# Patient Record
Sex: MALE | Race: WHITE | NOT HISPANIC OR LATINO | Employment: FULL TIME | URBAN - METROPOLITAN AREA
[De-identification: names, ages, dates, MRNs, and addresses within clinical notes are randomized per-mention and may not be internally consistent; named-entity substitution may affect disease eponyms.]

---

## 2017-03-03 ENCOUNTER — TELEPHONE (OUTPATIENT)
Dept: SURGERY | Facility: CLINIC | Age: 32
End: 2017-03-03

## 2017-03-03 NOTE — TELEPHONE ENCOUNTER
----- Message from Rbeanna Sherif sent at 3/3/2017  3:03 PM CST -----  Contact: self - [phone]  maria teresa - wants appt for fistula repair - wants appt this Monday - states he is in pain - please call patient at

## 2017-03-03 NOTE — TELEPHONE ENCOUNTER
Spoke with patient. Scheduled appointment 3/7/17. Patient states will bring all reports and imaging disc with him to appointment.

## 2017-03-07 ENCOUNTER — OFFICE VISIT (OUTPATIENT)
Dept: SURGERY | Facility: CLINIC | Age: 32
End: 2017-03-07
Payer: COMMERCIAL

## 2017-03-07 VITALS
HEART RATE: 79 BPM | BODY MASS INDEX: 27.64 KG/M2 | DIASTOLIC BLOOD PRESSURE: 75 MMHG | HEIGHT: 73 IN | SYSTOLIC BLOOD PRESSURE: 126 MMHG | WEIGHT: 208.56 LBS

## 2017-03-07 DIAGNOSIS — K60.3 ANAL FISTULA: Primary | ICD-10-CM

## 2017-03-07 PROCEDURE — 99204 OFFICE O/P NEW MOD 45 MIN: CPT | Mod: S$GLB,,, | Performed by: COLON & RECTAL SURGERY

## 2017-03-07 PROCEDURE — 1160F RVW MEDS BY RX/DR IN RCRD: CPT | Mod: S$GLB,,, | Performed by: COLON & RECTAL SURGERY

## 2017-03-07 PROCEDURE — 99999 PR PBB SHADOW E&M-EST. PATIENT-LVL III: CPT | Mod: PBBFAC,,, | Performed by: COLON & RECTAL SURGERY

## 2017-03-07 NOTE — PROGRESS NOTES
CRS Office Visit    SUBJECTIVE:     Chief Complaint: Fistula     History of Present Illness:  Patient is a 31 y.o. 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  He states that he first noted the fistula around 20 years ago (when diagnosed with crohns) and it intermittently drains.  It is currently not draining.  Recent colonoscopy and MR entography showed no other disease however no biopsies were taken. He has history of 3 abscess I&Ds to that area.  He is currently not on any crohn's medications.  States that he has taken numerous medications in the past and his gastroenterologist wants to start him on Humira after surgery.  He has no abdominal surgery history.  Complains of chronic diarrhea, no constipation or blood in stool.  Denies abdominal pain.       Review of patient's allergies indicates:  No Known Allergies     No pertinent PMH, PSH, FH, SH not mentioned in HPI       Review of Systems:   A 10-point review of systems is negative except for the above mentioned in the HPI.      OBJECTIVE:     Vital Signs (Most Recent)  Pulse: 79 (03/07/17 1552)  BP: 126/75 (03/07/17 1552)    Physical Exam:  General: Patient Refused male in NAD sitting in chair in clinic  Neuro: aaox4 maex4 perrl  Respiratory: resps even unlabored  Cardiac: cap refill <2 sec  Abdomen: Abdomen soft, nontender. BS normal. No masses, organomegaly or scars.  Extremities: Warm dry and intact  Anorectal: Posterior fissure noted, anal skin tags noted, fistula opening noted at 7'oclock with no drainage     Imaging:  Outside MRI Enterography shows early fistulous tracts/fissure within the distal rectum/perianal region    ASSESSMENT/PLAN:     32 yo M with possible hx of crohn's disease presents today for evaluation of perianal fistula     - To OR on 3/15/17 for seton placement  - Will obtain more records from referring physician about Crohn's history  - Plan to perform a colonoscopy in near future  with biopsies    Javier Sheppard M.D.  General Surgery PGY1  [phone]

## 2017-03-15 ENCOUNTER — ANESTHESIA (OUTPATIENT)
Dept: SURGERY | Facility: HOSPITAL | Age: 32
End: 2017-03-15
Payer: COMMERCIAL

## 2017-03-15 ENCOUNTER — SURGERY (OUTPATIENT)
Age: 32
End: 2017-03-15

## 2017-03-15 ENCOUNTER — ANESTHESIA EVENT (OUTPATIENT)
Dept: SURGERY | Facility: HOSPITAL | Age: 32
End: 2017-03-15
Payer: COMMERCIAL

## 2017-03-15 ENCOUNTER — HOSPITAL ENCOUNTER (OUTPATIENT)
Facility: HOSPITAL | Age: 32
Discharge: HOME | End: 2017-03-15
Attending: COLON & RECTAL SURGERY | Admitting: COLON & RECTAL SURGERY
Payer: COMMERCIAL

## 2017-03-15 VITALS
OXYGEN SATURATION: 100 % | TEMPERATURE: 98 F | HEIGHT: 73 IN | SYSTOLIC BLOOD PRESSURE: 120 MMHG | DIASTOLIC BLOOD PRESSURE: 65 MMHG | HEART RATE: 62 BPM | BODY MASS INDEX: 27.64 KG/M2 | WEIGHT: 208.56 LBS | RESPIRATION RATE: 15 BRPM

## 2017-03-15 DIAGNOSIS — K60.3 ANAL FISTULA: Primary | ICD-10-CM

## 2017-03-15 PROBLEM — K60.30 ANAL FISTULA: Status: ACTIVE | Noted: 2017-03-15

## 2017-03-15 PROCEDURE — 63600175 PHARM REV CODE 636 W HCPCS: Performed by: STUDENT IN AN ORGANIZED HEALTH CARE EDUCATION/TRAINING PROGRAM

## 2017-03-15 PROCEDURE — 71000015 HC POSTOP RECOV 1ST HR: Performed by: COLON & RECTAL SURGERY

## 2017-03-15 PROCEDURE — 46020 PLACEMENT OF SETON: CPT | Mod: ,,, | Performed by: COLON & RECTAL SURGERY

## 2017-03-15 PROCEDURE — 25000003 PHARM REV CODE 250: Performed by: NURSE ANESTHETIST, CERTIFIED REGISTERED

## 2017-03-15 PROCEDURE — 36000705 HC OR TIME LEV I EA ADD 15 MIN: Performed by: COLON & RECTAL SURGERY

## 2017-03-15 PROCEDURE — 36000704 HC OR TIME LEV I 1ST 15 MIN: Performed by: COLON & RECTAL SURGERY

## 2017-03-15 PROCEDURE — 37000009 HC ANESTHESIA EA ADD 15 MINS: Performed by: COLON & RECTAL SURGERY

## 2017-03-15 PROCEDURE — 63600175 PHARM REV CODE 636 W HCPCS: Performed by: NURSE ANESTHETIST, CERTIFIED REGISTERED

## 2017-03-15 PROCEDURE — D9220A PRA ANESTHESIA: Mod: ANES,,, | Performed by: ANESTHESIOLOGY

## 2017-03-15 PROCEDURE — 37000008 HC ANESTHESIA 1ST 15 MINUTES: Performed by: COLON & RECTAL SURGERY

## 2017-03-15 PROCEDURE — D9220A PRA ANESTHESIA: Mod: CRNA,,, | Performed by: NURSE ANESTHETIST, CERTIFIED REGISTERED

## 2017-03-15 PROCEDURE — 25000003 PHARM REV CODE 250: Performed by: STUDENT IN AN ORGANIZED HEALTH CARE EDUCATION/TRAINING PROGRAM

## 2017-03-15 PROCEDURE — 25000003 PHARM REV CODE 250: Performed by: COLON & RECTAL SURGERY

## 2017-03-15 PROCEDURE — 71000033 HC RECOVERY, INTIAL HOUR: Performed by: COLON & RECTAL SURGERY

## 2017-03-15 PROCEDURE — 25000003 PHARM REV CODE 250: Performed by: SURGERY

## 2017-03-15 RX ORDER — ACETAMINOPHEN 10 MG/ML
1000 INJECTION, SOLUTION INTRAVENOUS
Status: COMPLETED | OUTPATIENT
Start: 2017-03-15 | End: 2017-03-15

## 2017-03-15 RX ORDER — LIDOCAINE HYDROCHLORIDE 10 MG/ML
1 INJECTION, SOLUTION EPIDURAL; INFILTRATION; INTRACAUDAL; PERINEURAL ONCE
Status: DISCONTINUED | OUTPATIENT
Start: 2017-03-15 | End: 2017-03-15 | Stop reason: HOSPADM

## 2017-03-15 RX ORDER — OXYCODONE AND ACETAMINOPHEN 5; 325 MG/1; MG/1
1 TABLET ORAL EVERY 4 HOURS PRN
Qty: 41 TABLET | Refills: 0 | Status: SHIPPED | OUTPATIENT
Start: 2017-03-15 | End: 2017-03-30

## 2017-03-15 RX ORDER — LIDOCAINE HCL/PF 100 MG/5ML
SYRINGE (ML) INTRAVENOUS
Status: DISCONTINUED | OUTPATIENT
Start: 2017-03-15 | End: 2017-03-15

## 2017-03-15 RX ORDER — PROPOFOL 10 MG/ML
VIAL (ML) INTRAVENOUS
Status: DISCONTINUED | OUTPATIENT
Start: 2017-03-15 | End: 2017-03-15

## 2017-03-15 RX ORDER — MIDAZOLAM HYDROCHLORIDE 1 MG/ML
INJECTION, SOLUTION INTRAMUSCULAR; INTRAVENOUS
Status: DISCONTINUED | OUTPATIENT
Start: 2017-03-15 | End: 2017-03-15

## 2017-03-15 RX ORDER — SODIUM CHLORIDE 9 MG/ML
INJECTION, SOLUTION INTRAVENOUS CONTINUOUS PRN
Status: DISCONTINUED | OUTPATIENT
Start: 2017-03-15 | End: 2017-03-15

## 2017-03-15 RX ORDER — METRONIDAZOLE 500 MG/100ML
500 INJECTION, SOLUTION INTRAVENOUS
Status: DISCONTINUED | OUTPATIENT
Start: 2017-03-15 | End: 2017-03-15 | Stop reason: HOSPADM

## 2017-03-15 RX ORDER — FENTANYL CITRATE 50 UG/ML
INJECTION, SOLUTION INTRAMUSCULAR; INTRAVENOUS
Status: DISCONTINUED | OUTPATIENT
Start: 2017-03-15 | End: 2017-03-15

## 2017-03-15 RX ORDER — OXYCODONE AND ACETAMINOPHEN 5; 325 MG/1; MG/1
1 TABLET ORAL EVERY 4 HOURS PRN
Status: DISCONTINUED | OUTPATIENT
Start: 2017-03-15 | End: 2017-03-15 | Stop reason: HOSPADM

## 2017-03-15 RX ORDER — ROCURONIUM BROMIDE 10 MG/ML
INJECTION, SOLUTION INTRAVENOUS
Status: DISCONTINUED | OUTPATIENT
Start: 2017-03-15 | End: 2017-03-15

## 2017-03-15 RX ADMIN — MIDAZOLAM HYDROCHLORIDE 2 MG: 1 INJECTION, SOLUTION INTRAMUSCULAR; INTRAVENOUS at 07:03

## 2017-03-15 RX ADMIN — SODIUM CHLORIDE, SODIUM GLUCONATE, SODIUM ACETATE, POTASSIUM CHLORIDE, MAGNESIUM CHLORIDE, SODIUM PHOSPHATE, DIBASIC, AND POTASSIUM PHOSPHATE: .53; .5; .37; .037; .03; .012; .00082 INJECTION, SOLUTION INTRAVENOUS at 07:03

## 2017-03-15 RX ADMIN — ROCURONIUM BROMIDE 40 MG: 10 INJECTION, SOLUTION INTRAVENOUS at 07:03

## 2017-03-15 RX ADMIN — OXYCODONE HYDROCHLORIDE AND ACETAMINOPHEN 1 TABLET: 5; 325 TABLET ORAL at 08:03

## 2017-03-15 RX ADMIN — FENTANYL CITRATE 100 MCG: 50 INJECTION, SOLUTION INTRAMUSCULAR; INTRAVENOUS at 07:03

## 2017-03-15 RX ADMIN — BUPIVACAINE 20 ML: 13.3 INJECTION, SUSPENSION, LIPOSOMAL INFILTRATION at 08:03

## 2017-03-15 RX ADMIN — LIDOCAINE HYDROCHLORIDE 100 MG: 20 INJECTION, SOLUTION INTRAVENOUS at 07:03

## 2017-03-15 RX ADMIN — PROPOFOL 200 MG: 10 INJECTION, EMULSION INTRAVENOUS at 07:03

## 2017-03-15 RX ADMIN — ACETAMINOPHEN 1000 MG: 10 INJECTION, SOLUTION INTRAVENOUS at 03:03

## 2017-03-15 RX ADMIN — IBUPROFEN 800 MG: 800 INJECTION INTRAVENOUS at 04:03

## 2017-03-15 RX ADMIN — GELATIN ABSORBABLE SPONGE 12-7 MM 1 EACH: 12-7 MISC at 08:03

## 2017-03-15 RX ADMIN — SODIUM CHLORIDE: 0.9 INJECTION, SOLUTION INTRAVENOUS at 06:03

## 2017-03-15 NOTE — IP AVS SNAPSHOT
[facility]  [address]  [facility] [address]  Phone: [phone]           Patient Discharge Instructions     Our goal is to set you up for success. This packet includes information on your condition, medications, and your home care. It will help you to care for yourself so you don't get sicker and need to go back to the hospital.     Please ask your nurse if you have any questions.        There are many details to remember when preparing to leave the hospital. Here is what you will need to do:    1. Take your medicine. If you are prescribed medications, review your Medication List in the following pages. You may have new medications to  at the pharmacy and others that you'll need to stop taking. Review the instructions for how and when to take your medications. Talk with your doctor or nurses if you are unsure of what to do.     2. Go to your follow-up appointments. Specific follow-up information is listed in the following pages. Your may be contacted by a transition nurse or clinical provider about future appointments. Be sure we have all of the phone numbers to reach you, if needed. Please contact your provider's office if you are unable to make an appointment.     3. Watch for warning signs. Your doctor or nurse will give you detailed warning signs to watch for and when to call for assistance. These instructions may also include educational information about your condition. If you experience any of warning signs to your health, call your doctor.               [facility]  Unless otherwise directed by your provider, please contact [facility], our nurse care line that is available for 24/7 assistance.     [phone] (toll-free)    Registered nurses in the [facility] provide clinical advisement, health education, appointment booking, and other advisory services.                    ** Verify the list of medication(s) below is accurate and up  to date. Carry this with you in case of emergency. If your medications have changed, please notify your healthcare provider.             Medication List      START taking these medications        Additional Info                      docusate sodium 50 MG capsule   Commonly known as:  COLACE   Refills:  0   Dose:  50 mg    Instructions:  Take 1 capsule (50 mg total) by mouth 2 (two) times daily.     Begin Date    AM    Noon    PM    Bedtime       oxycodone-acetaminophen 5-325 mg per tablet   Commonly known as:  PERCOCET   Quantity:  41 tablet   Refills:  0   Dose:  1 tablet    Last time this was given:  1 tablet on 3/15/2017  8:16 PM   Instructions:  Take 1 tablet by mouth every 4 (four) hours as needed for Pain.     Begin Date    AM    Noon    PM    Bedtime            Where to Get Your Medications      You can get these medications from any pharmacy     Bring a paper prescription for each of these medications     oxycodone-acetaminophen 5-325 mg per tablet       You don't need a prescription for these medications     docusate sodium 50 MG capsule                  Please bring to all follow up appointments:    1. A copy of your discharge instructions.  2. All medicines you are currently taking in their original bottles.  3. Identification and insurance card.    Please arrive 15 minutes ahead of scheduled appointment time.    Please call 24 hours in advance if you must reschedule your appointment and/or time.        Follow-up Information     Follow up with Javier Cason MD In 2 weeks.    Specialty:  Colon and Rectal Surgery    Contact information:    [address] [facility] [address]  [phone]          Discharge Instructions     Future Orders    Call MD for:  difficulty breathing or increased cough     Call MD for:  increased confusion or weakness     Call MD for:  persistent dizziness, light-headedness, or visual disturbances     Call MD for:  persistent nausea and vomiting or diarrhea     Call MD for:   redness, tenderness, or signs of infection (pain, swelling, redness, odor or green/yellow discharge around incision site)     Call MD for:  severe persistent headache     Call MD for:  severe uncontrolled pain     Call MD for:  temperature >100.4     Call MD for:  worsening rash     Diet general     Questions:    Total calories:      Fat restriction, if any:      Protein restriction, if any:      Na restriction, if any:      Fluid restriction:      Additional restrictions:      No dressing needed     Other restrictions (specify):     Comments:    No driving while still taking pain medications daily.   Take a stool softener while taking pain medications daily.        Discharge Instructions       ACTIVITY LEVEL:  If you received sedation and/or an anesthetic, you may feel sleepy for several hours. Rest until you feel more  awake. Gradually resume your normal activities.    DIET:  At home, continue with liquids. If there is no nausea, you may eat a soft diet and gradually resume a high fiber  diet. Encourage fluids.    SPECIAL INSTRUCTIONS:  Eat plenty of fruits and vegetables. Drink 8-10 glasses of water or juice every day. Eat whole grain cereals and  breads. Salads with raw vegetables are also a good source of fiber.    DRESSING:  You may use 4 x 4 gauze to the surgical area for any drainage. Wash the area with mild soap  during your regular bath. Use soft, damp tissue or disposable wipes not containing alcohol when wiping after  bowel movements, pat the area and gently dry. Avoid excessive or vigorous wiping. It may help to place soft  tissue or a cotton pad between the buttocks to keep the cheeks  and to absorb any moisture.    MEDICIATIONS:  You will receive instructions for your pain and/or antibiotic prescriptions. Pain medication should be taken  only if needed, and as directed. Antibiotics should be taken as directed until the entire prescription is  completed. If ordered, take stool softeners as  "directed.    WHEN TO CALL THE DOCTOR:   For any obvious active bleeding   Redness or swelling around the incision   Fever over 101°F (38.4°C)   Drainage (pus) from the wound   Persistent pain not relieved by the pain medication        Primary Diagnosis     Your primary diagnosis was:  Anal Fistula      Admission Information     Date & Time Provider Department CSN    3/15/2017 11:02 AM Javier Cason MD [facility] [id number]      Care Providers     Provider Role Specialty Primary office phone    Javier Cason MD Attending Provider Colon and Rectal Surgery [phone]    Javier Cason MD Surgeon  Colon and Rectal Surgery [phone]      Your Vitals Were     BP Pulse Temp Resp Height Weight    119/69 60 98 °F (36.7 °C) (Oral) 13 6' 1" (1.854 m) 94.6 kg (208 lb 8.9 oz)    SpO2 BMI             100% 27.52 kg/m2         Recent Lab Values     No lab values to display.      Allergies as of 3/15/2017     No Known Allergies      Advance Directives     An advance directive is a document which, in the event you are no longer able to make decisions for yourself, tells your healthcare team what kind of treatment you do or do not want to receive, or who you would like to make those decisions for you.  If you do not currently have an advance directive, [facility] encourages you to create one.  For more information call:  [phone]), [phone] ([phone]),  or log on to [email or web address].        Language Assistance Services     ATTENTION: Language assistance services are available, free of charge. Please call [phone].      ATENCIÓN: Si habla español, tiene a jimenez disposición servicios gratuitos de asistencia lingüística. Llame al [phone].     CHÚ Ý: N?u b?n nói Ti?ng Vi?t, có các d?ch v? h? tr? ngôn ng? mi?n phí dành cho b?n. G?i s? [phone].        [facility] Sign-Up     Activating your [facility] account is as easy as 1-2-3!     1) Visit " [email or web address], select Sign Up Now, enter this activation code and your date of birth, then select Next.  [email or web address]  Expires: 4/29/2017  8:53 PM      2) Create a username and password to use when you visit [facility] in the future and select a security question in case you lose your password and select Next.    3) Enter your e-mail address and click Sign Up!    Additional Information  If you have questions, please e-mail [email or web address] or call [phone] to talk to our [facility] staff. Remember, [facility] is NOT to be used for urgent needs. For medical emergencies, dial 911.          [facility] complies with applicable Federal civil rights laws and does not discriminate on the basis of race, color, national origin, age, disability, or sex.

## 2017-03-15 NOTE — INTERVAL H&P NOTE
The patient has been examined and the H&P has been reviewed:    I concur with the findings and no changes have occurred since H&P was written.    Anesthesia/Surgery risks, benefits and alternative options discussed and understood by patient/family.          Active Hospital Problems    Diagnosis  POA    Anal fistula [K60.3]  Yes      Resolved Hospital Problems    Diagnosis Date Resolved POA   No resolved problems to display.

## 2017-03-15 NOTE — H&P (VIEW-ONLY)
CRS Office Visit    SUBJECTIVE:     Chief Complaint: Fistula     History of Present Illness:  Patient is a 31 y.o. 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  He states that he first noted the fistula around 20 years ago (when diagnosed with crohns) and it intermittently drains.  It is currently not draining.  Recent colonoscopy and MR entography showed no other disease however no biopsies were taken. He has history of 3 abscess I&Ds to that area.  He is currently not on any crohn's medications.  States that he has taken numerous medications in the past and his gastroenterologist wants to start him on Humira after surgery.  He has no abdominal surgery history.  Complains of chronic diarrhea, no constipation or blood in stool.  Denies abdominal pain.       Review of patient's allergies indicates:  No Known Allergies     No pertinent PMH, PSH, FH, SH not mentioned in HPI       Review of Systems:   A 10-point review of systems is negative except for the above mentioned in the HPI.      OBJECTIVE:     Vital Signs (Most Recent)  Pulse: 79 (03/07/17 1552)  BP: 126/75 (03/07/17 1552)    Physical Exam:  General: Patient Refused male in NAD sitting in chair in clinic  Neuro: aaox4 maex4 perrl  Respiratory: resps even unlabored  Cardiac: cap refill <2 sec  Abdomen: Abdomen soft, nontender. BS normal. No masses, organomegaly or scars.  Extremities: Warm dry and intact  Anorectal: Posterior fissure noted, anal skin tags noted, fistula opening noted at 7'oclock with no drainage     Imaging:  Outside MRI Enterography shows early fistulous tracts/fissure within the distal rectum/perianal region    ASSESSMENT/PLAN:     32 yo M with possible hx of crohn's disease presents today for evaluation of perianal fistula     - To OR on 3/15/17 for seton placement  - Will obtain more records from referring physician about Crohn's history  - Plan to perform a colonoscopy in near future  with biopsies    Javier Sheppard M.D.  General Surgery PGY1  [phone]

## 2017-03-15 NOTE — ANESTHESIA PREPROCEDURE EVALUATION
03/15/2017  Joss Matute is a 31 y.o., male with Crohn's disease here for seton drain    [facility] Anesthesia Evaluation    I have reviewed the Patient Summary Reports.    I have reviewed the Nursing Notes.   I have reviewed the Medications.     Review of Systems  Anesthesia Hx:  No problems with previous Anesthesia    EENT/Dental:EENT/Dental Normal   Cardiovascular:  Cardiovascular Normal     Pulmonary:  Pulmonary Normal    Renal/:  Renal/ Normal     Hepatic/GI:   Crohn's       Physical Exam  General:  Well nourished    Airway/Jaw/Neck:  Airway Findings: Mouth Opening: Normal Tongue: Normal  General Airway Assessment: Adult  Mallampati: I  TM Distance: Normal, at least 6 cm       Chest/Lungs:  Chest/Lungs Findings: Clear to auscultation, Normal Respiratory Rate     Heart/Vascular:  Heart Findings: Rate: Normal  Rhythm: Regular Rhythm             Anesthesia Plan  Type of Anesthesia, risks & benefits discussed:  Anesthesia Type:  general  Patient's Preference:   Intra-op Monitoring Plan:   Intra-op Monitoring Plan Comments:   Post Op Pain Control Plan:   Post Op Pain Control Plan Comments:   Induction:   IV  Beta Blocker:  Patient is not currently on a Beta-Blocker (No further documentation required).       Informed Consent: Patient understands risks and agrees with Anesthesia plan.  Questions answered. Anesthesia consent signed with patient.  ASA Score: 1     Day of Surgery Review of History & Physical:            Ready For Surgery From Anesthesia Perspective.     Patient Active Problem List   Diagnosis    Anal fistula

## 2017-03-16 NOTE — OP NOTE
DATE OF PROCEDURE:  03/15/2017    PREOPERATIVE DIAGNOSIS:  Anal fistula.    POSTOPERATIVE DIAGNOSIS:  Anal fistula, multiple perianal skin tags.    PROCEDURE:  Exam under anesthesia, placement of a seton.    SURGEON:  Javier Cason M.D.    RESIDENT:  Alba Rodrigues M.D. (RES).    ANESTHESIA:  Endotracheal.    INDICATION:  Mr. Matute is a 31-year-old male who has a chronic draining fistula   with skin tags, some question of Crohn disease.  Based on this, surgery is   indicated.    PROCEDURE IN DETAIL:  The patient was taken to the operating room, placed in   prone jackknife position under general anesthesia.  Buttock was effaced.    Perineum was prepped and draped in a sterile manner.  On inspection of the   perineum, I find that he has multiple posterolateral fleshy skin tags, one of   them actually had a fistula through it.  The fistula that had been seen in the   office was identified.  A probe was passed.  This was transsphincteric and   involved a significant amount of muscle.  Anoscopic evaluation revealed an   inflamed digital distal rectal mucosa.  Once the fistula probe was passed, it   was exchanged with a silk tie, which was then replaced with a vessel loop for   drainage.  The seton was then secured.  There were no other signs of abscesses   or fistulas.  Exparel was placed.  Sponge and needle counts were correct.  The   patient tolerated the procedure and transferred to the recovery room in stable   condition.      PATRICK/JENNIFER  dd: 03/15/2017 20:11:03 (CDT)  td: 03/15/2017 23:08:15 (CDT)  Doc ID   #[id number]  Job ID #[id number]    CC:

## 2017-03-16 NOTE — ANESTHESIA POSTPROCEDURE EVALUATION
"Anesthesia Post Evaluation    Patient: Joss Matute    Procedure(s) Performed: Procedure(s) (LRB):  PLACEMENT-SETON DRAIN (N/A)    Final Anesthesia Type: general  Patient location during evaluation: PACU  Patient participation: Yes- Able to Participate  Level of consciousness: awake and alert  Post-procedure vital signs: reviewed and stable  Pain management: adequate  Airway patency: patent  PONV status at discharge: No PONV  Anesthetic complications: no      Cardiovascular status: blood pressure returned to baseline  Respiratory status: unassisted  Hydration status: euvolemic          Visit Vitals    /75    Pulse 60    Temp 36.8 °C (98.2 °F) (Oral)    Resp 12    Ht 6' 1" (1.854 m)    Wt 94.6 kg (208 lb 8.9 oz)    SpO2 99%    BMI 27.52 kg/m2       Pain/Ferny Score: Pain Assessment Performed: Yes (3/15/2017  8:10 PM)  Presence of Pain: complains of pain/discomfort (3/15/2017  8:10 PM)  Pain Rating Prior to Med Admin: 4 (3/15/2017  8:16 PM)  Ferny Score: 10 (3/15/2017  8:10 PM)      "

## 2017-03-16 NOTE — DISCHARGE INSTRUCTIONS
ACTIVITY LEVEL:  If you received sedation and/or an anesthetic, you may feel sleepy for several hours. Rest until you feel more  awake. Gradually resume your normal activities.    DIET:  At home, continue with liquids. If there is no nausea, you may eat a soft diet and gradually resume a high fiber  diet. Encourage fluids.    SPECIAL INSTRUCTIONS:  Eat plenty of fruits and vegetables. Drink 8-10 glasses of water or juice every day. Eat whole grain cereals and  breads. Salads with raw vegetables are also a good source of fiber.    DRESSING:  You may use 4 x 4 gauze to the surgical area for any drainage. Wash the area with mild soap  during your regular bath. Use soft, damp tissue or disposable wipes not containing alcohol when wiping after  bowel movements, pat the area and gently dry. Avoid excessive or vigorous wiping. It may help to place soft  tissue or a cotton pad between the buttocks to keep the cheeks  and to absorb any moisture.    MEDICIATIONS:  You will receive instructions for your pain and/or antibiotic prescriptions. Pain medication should be taken  only if needed, and as directed. Antibiotics should be taken as directed until the entire prescription is  completed. If ordered, take stool softeners as directed.    WHEN TO CALL THE DOCTOR:   For any obvious active bleeding   Redness or swelling around the incision   Fever over 101°F (38.4°C)   Drainage (pus) from the wound   Persistent pain not relieved by the pain medication

## 2017-03-16 NOTE — TRANSFER OF CARE
"Anesthesia Transfer of Care Note    Patient: Joss Alvarez Juju    Procedure(s) Performed: Procedure(s) (LRB):  PLACEMENT-SETON DRAIN (N/A)    Patient location: PACU    Anesthesia Type: general    Transport from OR: Transported from OR on room air with adequate spontaneous ventilation    Post pain: adequate analgesia    Post assessment: no apparent anesthetic complications and tolerated procedure well    Post vital signs: stable    Level of consciousness: awake, alert and oriented    Nausea/Vomiting: no nausea/vomiting    Complications: none          Last vitals:   Visit Vitals    /80 (BP Location: Left arm, Patient Position: Lying, BP Method: Automatic)    Pulse 90    Temp 36.8 °C (98.2 °F) (Oral)    Resp 14    Ht 6' 1" (1.854 m)    Wt 94.6 kg (208 lb 8.9 oz)    SpO2 100%    BMI 27.52 kg/m2     "

## 2017-03-16 NOTE — ANESTHESIA RELEASE NOTE
"Anesthesia Release from PACU Note    Patient: Joss Matute    Procedure(s) Performed: Procedure(s) (LRB):  PLACEMENT-SETON DRAIN (N/A)    Anesthesia type: general    Post pain: Adequate analgesia    Post assessment: no apparent anesthetic complications    Last Vitals:   Visit Vitals    /75    Pulse 60    Temp 36.8 °C (98.2 °F) (Oral)    Resp 12    Ht 6' 1" (1.854 m)    Wt 94.6 kg (208 lb 8.9 oz)    SpO2 99%    BMI 27.52 kg/m2       Post vital signs: stable    Level of consciousness: awake    Nausea/Vomiting: no nausea/no vomiting    Complications: none    Airway Patency: patent    Respiratory: unassisted    Cardiovascular: stable and blood pressure at baseline    Hydration: euvolemic  "

## 2017-03-16 NOTE — BRIEF OP NOTE
Operative Note     SUMMARY     Surgery Date: 3/15/2017     Surgeon(s) and Role:     * Javier Cason MD - Primary     * Alba Rodrigues MD - Resident - Assisting    Pre-op Diagnosis:  Anal fistula [K60.3]    Post-op Diagnosis:  Post-Op Diagnosis Codes:     * Anal fistula [K60.3]    Procedure(s) (LRB):  PLACEMENT-SETON DRAIN (N/A)    Anesthesia: General    Description of Procedure:   Seton placement     Findings/Key Components:  Fistula skin tags    Estimated Blood Loss:  10         Specimens     None            Discharge Note      SUMMARY     Admit Date: 3/15/2017    Attending Physician: Javier Cason MD     Discharge Physician: Javier Cason MD    Discharge Date: 3/15/2017     Final Diagnosis: Post-Op Diagnosis Codes:     * Anal fistula [K60.3]    Hospital Course: Patient was admitted for an outpatient procedure and tolerated the procedure well with no complications.    Disposition: Home or Self Care    Follow Up/Patient Instructions:   Current Discharge Medication List      START taking these medications    Details   docusate sodium (COLACE) 50 MG capsule Take 1 capsule (50 mg total) by mouth 2 (two) times daily.  Refills: 0      oxycodone-acetaminophen (PERCOCET) 5-325 mg per tablet Take 1 tablet by mouth every 4 (four) hours as needed for Pain.  Qty: 41 tablet, Refills: 0             Discharge Procedure Orders (must include Diet, Follow-up, Activity)    Discharge Procedure Orders (must include Diet, Follow-up, Activity)  Diet general     Other restrictions (specify):   Order Comments: No driving while still taking pain medications daily.   Take a stool softener while taking pain medications daily.     Call MD for:  temperature >100.4     Call MD for:  persistent nausea and vomiting or diarrhea     Call MD for:  severe uncontrolled pain     Call MD for:  redness, tenderness, or signs of infection (pain, swelling, redness, odor or green/yellow discharge around incision site)     Call MD for:   difficulty breathing or increased cough     Call MD for:  severe persistent headache     Call MD for:  worsening rash     Call MD for:  persistent dizziness, light-headedness, or visual disturbances     Call MD for:  increased confusion or weakness     No dressing needed      regular diet  Activity ad bashir  remove dressing in Am  RTC 2 weeks

## 2017-03-22 ENCOUNTER — TELEPHONE (OUTPATIENT)
Dept: SURGERY | Facility: CLINIC | Age: 32
End: 2017-03-22

## 2017-03-22 NOTE — TELEPHONE ENCOUNTER
Spoke with patient. States pain controlled with Advil. No other issues or concerns. Post op appointment scheduled.

## 2017-03-22 NOTE — TELEPHONE ENCOUNTER
----- Message from Tasha Bray sent at 3/20/2017  2:24 PM CDT -----  Contact: Pt# [phone]  Pt states he would like to speak to the nurse about some pain issues after surgery 3/15 and would like a phone call back# [phone]

## 2017-03-30 ENCOUNTER — OFFICE VISIT (OUTPATIENT)
Dept: SURGERY | Facility: CLINIC | Age: 32
End: 2017-03-30
Payer: COMMERCIAL

## 2017-03-30 ENCOUNTER — TELEPHONE (OUTPATIENT)
Dept: ENDOSCOPY | Facility: HOSPITAL | Age: 32
End: 2017-03-30

## 2017-03-30 VITALS
WEIGHT: 204.81 LBS | HEART RATE: 70 BPM | BODY MASS INDEX: 27.14 KG/M2 | DIASTOLIC BLOOD PRESSURE: 74 MMHG | HEIGHT: 73 IN | SYSTOLIC BLOOD PRESSURE: 114 MMHG

## 2017-03-30 DIAGNOSIS — Z12.11 SPECIAL SCREENING FOR MALIGNANT NEOPLASMS, COLON: Primary | ICD-10-CM

## 2017-03-30 DIAGNOSIS — Z98.890 POSTOPERATIVE STATE: ICD-10-CM

## 2017-03-30 DIAGNOSIS — K50.113 CROHN'S DISEASE OF LARGE INTESTINE WITH FISTULA: Primary | ICD-10-CM

## 2017-03-30 PROCEDURE — 99024 POSTOP FOLLOW-UP VISIT: CPT | Mod: S$GLB,,, | Performed by: COLON & RECTAL SURGERY

## 2017-03-30 PROCEDURE — 99999 PR PBB SHADOW E&M-EST. PATIENT-LVL III: CPT | Mod: PBBFAC,,, | Performed by: COLON & RECTAL SURGERY

## 2017-03-30 RX ORDER — POLYETHYLENE GLYCOL 3350, SODIUM SULFATE ANHYDROUS, SODIUM BICARBONATE, SODIUM CHLORIDE, POTASSIUM CHLORIDE 236; 22.74; 6.74; 5.86; 2.97 G/4L; G/4L; G/4L; G/4L; G/4L
4 POWDER, FOR SOLUTION ORAL ONCE
Qty: 4000 ML | Refills: 0 | Status: SHIPPED | OUTPATIENT
Start: 2017-03-30 | End: 2017-03-30

## 2017-03-30 NOTE — PROGRESS NOTES
CRS Post-operative visit    Visit Info:   DATE OF PROCEDURE: 03/15/2017     PREOPERATIVE DIAGNOSIS: Anal fistula.     POSTOPERATIVE DIAGNOSIS: Anal fistula, multiple perianal skin tags.     PROCEDURE: Exam under anesthesia, placement of a seton.       INDICATION: Mr. Matute is a 31-year-old male who has a chronic draining fistula  with skin tags, some question of Crohn disease. Had an equivicol Prom[employer] test. Is on no meds    Current Status: Doing well postoperatively.    Physical Exam:  General: White male in NAD sitting in chair in clinic  Neuro: aaox4 maex4 perrl  Respiratory: resps even unlabored  Cardiac: cap refill <2 sec  Abdomen: Normal, benign. Incisions:seton in place   Anorectal: no erythema , multiple fleshy skin tags  Assessment and Plan:  Joss Alvarez was seen today for post op .    Diagnoses and all orders for this visit:    Crohn's disease of large intestine with fistula  -     Case request GI: COLONOSCOPY    Postoperative state    Will arrainge for csope with Dr Spence to assess for active crohns disease.   Pending scope results and treatment will either plan on LIFT or medical management th seton removal   RTC 3 weeks

## 2017-03-30 NOTE — TELEPHONE ENCOUNTER
Patient is scheduled for Colonoscopy 4/7/2017 with Dr. LEANDRO Spence.  Instructions sent via e-mail.  Prep used: PEG.

## 2017-04-05 ENCOUNTER — TELEPHONE (OUTPATIENT)
Dept: GASTROENTEROLOGY | Facility: CLINIC | Age: 32
End: 2017-04-05

## 2017-04-05 NOTE — TELEPHONE ENCOUNTER
Called and spoke with pt  I explained our process and did new pt screen.  I explained we will be in touch to get him scheduled sometime after his scope.  He expressed understanding

## 2017-04-07 ENCOUNTER — HOSPITAL ENCOUNTER (OUTPATIENT)
Facility: HOSPITAL | Age: 32
Discharge: HOME | End: 2017-04-07
Attending: INTERNAL MEDICINE | Admitting: INTERNAL MEDICINE
Payer: COMMERCIAL

## 2017-04-07 ENCOUNTER — ANESTHESIA EVENT (OUTPATIENT)
Dept: ENDOSCOPY | Facility: HOSPITAL | Age: 32
End: 2017-04-07
Payer: COMMERCIAL

## 2017-04-07 ENCOUNTER — SURGERY (OUTPATIENT)
Age: 32
End: 2017-04-07

## 2017-04-07 ENCOUNTER — ANESTHESIA (OUTPATIENT)
Dept: ENDOSCOPY | Facility: HOSPITAL | Age: 32
End: 2017-04-07
Payer: COMMERCIAL

## 2017-04-07 VITALS
HEIGHT: 74 IN | BODY MASS INDEX: 26.31 KG/M2 | WEIGHT: 205 LBS | RESPIRATION RATE: 18 BRPM | OXYGEN SATURATION: 100 % | SYSTOLIC BLOOD PRESSURE: 105 MMHG | HEART RATE: 66 BPM | DIASTOLIC BLOOD PRESSURE: 59 MMHG | RESPIRATION RATE: 14 BRPM | TEMPERATURE: 98 F

## 2017-04-07 DIAGNOSIS — K50.90 CROHN'S DISEASE: ICD-10-CM

## 2017-04-07 DIAGNOSIS — K60.3 ANAL FISTULA: ICD-10-CM

## 2017-04-07 DIAGNOSIS — K50.919 CROHN'S DISEASE WITH COMPLICATION, UNSPECIFIED GASTROINTESTINAL TRACT LOCATION: Primary | ICD-10-CM

## 2017-04-07 PROCEDURE — 37000009 HC ANESTHESIA EA ADD 15 MINS: Performed by: INTERNAL MEDICINE

## 2017-04-07 PROCEDURE — D9220A PRA ANESTHESIA: Mod: CRNA,,, | Performed by: NURSE ANESTHETIST, CERTIFIED REGISTERED

## 2017-04-07 PROCEDURE — 25000003 PHARM REV CODE 250: Performed by: INTERNAL MEDICINE

## 2017-04-07 PROCEDURE — 63600175 PHARM REV CODE 636 W HCPCS: Performed by: NURSE ANESTHETIST, CERTIFIED REGISTERED

## 2017-04-07 PROCEDURE — 45380 COLONOSCOPY AND BIOPSY: CPT | Performed by: INTERNAL MEDICINE

## 2017-04-07 PROCEDURE — 88305 TISSUE EXAM BY PATHOLOGIST: CPT | Performed by: PATHOLOGY

## 2017-04-07 PROCEDURE — 37000008 HC ANESTHESIA 1ST 15 MINUTES: Performed by: INTERNAL MEDICINE

## 2017-04-07 PROCEDURE — 27201012 HC FORCEPS, HOT/COLD, DISP: Performed by: INTERNAL MEDICINE

## 2017-04-07 PROCEDURE — D9220A PRA ANESTHESIA: Mod: ANES,,, | Performed by: ANESTHESIOLOGY

## 2017-04-07 PROCEDURE — 88305 TISSUE EXAM BY PATHOLOGIST: CPT | Mod: 26,,, | Performed by: PATHOLOGY

## 2017-04-07 PROCEDURE — 45380 COLONOSCOPY AND BIOPSY: CPT | Mod: ,,, | Performed by: INTERNAL MEDICINE

## 2017-04-07 PROCEDURE — 88342 IMHCHEM/IMCYTCHM 1ST ANTB: CPT | Mod: 26,,, | Performed by: PATHOLOGY

## 2017-04-07 PROCEDURE — 25000003 PHARM REV CODE 250: Performed by: NURSE ANESTHETIST, CERTIFIED REGISTERED

## 2017-04-07 RX ORDER — SODIUM CHLORIDE 9 MG/ML
INJECTION, SOLUTION INTRAVENOUS CONTINUOUS
Status: DISCONTINUED | OUTPATIENT
Start: 2017-04-07 | End: 2017-04-07 | Stop reason: HOSPADM

## 2017-04-07 RX ORDER — PROPOFOL 10 MG/ML
VIAL (ML) INTRAVENOUS CONTINUOUS PRN
Status: DISCONTINUED | OUTPATIENT
Start: 2017-04-07 | End: 2017-04-07

## 2017-04-07 RX ORDER — PROPOFOL 10 MG/ML
VIAL (ML) INTRAVENOUS
Status: DISCONTINUED | OUTPATIENT
Start: 2017-04-07 | End: 2017-04-07

## 2017-04-07 RX ORDER — LIDOCAINE HCL/PF 100 MG/5ML
SYRINGE (ML) INTRAVENOUS
Status: DISCONTINUED | OUTPATIENT
Start: 2017-04-07 | End: 2017-04-07

## 2017-04-07 RX ADMIN — PROPOFOL 200 MCG/KG/MIN: 10 INJECTION, EMULSION INTRAVENOUS at 11:04

## 2017-04-07 RX ADMIN — PROPOFOL 20 MG: 10 INJECTION, EMULSION INTRAVENOUS at 11:04

## 2017-04-07 RX ADMIN — PROPOFOL 100 MG: 10 INJECTION, EMULSION INTRAVENOUS at 11:04

## 2017-04-07 RX ADMIN — PROPOFOL 50 MG: 10 INJECTION, EMULSION INTRAVENOUS at 11:04

## 2017-04-07 RX ADMIN — PROPOFOL 20 MG: 10 INJECTION, EMULSION INTRAVENOUS at 12:04

## 2017-04-07 RX ADMIN — PROPOFOL 70 MG: 10 INJECTION, EMULSION INTRAVENOUS at 11:04

## 2017-04-07 RX ADMIN — LIDOCAINE HYDROCHLORIDE 100 MG: 20 INJECTION, SOLUTION INTRAVENOUS at 11:04

## 2017-04-07 RX ADMIN — PROPOFOL 10 MG: 10 INJECTION, EMULSION INTRAVENOUS at 11:04

## 2017-04-07 RX ADMIN — SODIUM CHLORIDE: 0.9 INJECTION, SOLUTION INTRAVENOUS at 11:04

## 2017-04-07 RX ADMIN — SODIUM CHLORIDE: 0.9 INJECTION, SOLUTION INTRAVENOUS at 10:04

## 2017-04-07 NOTE — DISCHARGE INSTRUCTIONS
Colonoscopy     A camera attached to a flexible tube with a viewing lens is used to take video pictures.     Colonoscopy is a test to view the inside of your lower digestive tract (colon and rectum). Sometimes it can show the last part of the small intestine (ileum). During the test, small pieces of tissue may be removed for testing. This is called a biopsy. Small growths, such as polyps, may also be removed.   Why is colonoscopy done?  The test is done to help look for colon cancer. And it can help find the source of abdominal pain, bleeding, and changes in bowel habits. It may be needed once a year, depending on factors such as your:  · Age  · Health history  · Family health history  · Symptoms  · Results from any prior colonoscopy  Risks and possible complications  These include:  · Bleeding               · A puncture or tear in the colon   · Risks of anesthesia  · A cancer lesion not being seen  Getting ready   To prepare for the test:  · Talk with your healthcare provider about the risks of the test (see below). Also ask your healthcare provider about alternatives to the test.  · Tell your healthcare provider about any medicines you take. Also tell him or her about any health conditions you may have.  · Make sure your rectum and colon are empty for the test. Follow the diet and bowel prep instructions exactly. If you dont, the test may need to be rescheduled.  · Plan for a friend or family member to drive you home after the test.     Colonoscopy provides an inside view of the entire colon.     You may discuss the results with your doctor right away or at a future visit.  During the test   The test is usually done in the hospital on an outpatient basis. This means you go home the same day. The procedure takes about 30 minutes. During that time:  · You are given relaxing (sedating) medicine through an IV line. You may be drowsy, or fully asleep.  · The healthcare provider will first give you a physical exam to  check for anal and rectal problems.  · Then the anus is lubricated and the scope inserted.  · If you are awake, you may have a feeling similar to needing to have a bowel movement. You may also feel pressure as air is pumped into the colon. Its OK to pass gas during the procedure.  · Biopsy, polyp removal, or other treatments may be done during the test.  After the test   You may have gas right after the test. It can help to try to pass it to help prevent later bloating. Your healthcare provider may discuss the results with you right away. Or you may need to schedule a follow-up visit to talk about the results. After the test, you can go back to your normal eating and other activities. You may be tired from the sedation and need to rest for a few hours.  Date Last Reviewed: 11/1/2016  © 7522-7624 [employer] [address] [place] [address], [place] [address]. All rights reserved. This information is not intended as a substitute for professional medical care. Always follow your healthcare professional's instructions.

## 2017-04-07 NOTE — IP AVS SNAPSHOT
[facility]  [address]  [facility] [address]  Phone: [phone]           Patient Discharge Instructions   Our goal is to set you up for success. This packet includes information on your condition, medications, and your home care.  It will help you care for yourself to prevent having to return to the hospital.     Please ask your nurse if you have any questions.      There are many details to remember when preparing to leave the hospital. Here is what you will need to do:    1. Take your medicine. If you are prescribed medications, review your Medication List on the following pages. You may have new medications to  at the pharmacy and others that you'll need to stop taking. Review the instructions for how and when to take your medications. Talk with your doctor or nurses if you are unsure of what to do.     2. Go to your follow-up appointments. Specific follow-up information is listed in the following pages. Your may be contacted by a nurse or clinical provider about future appointments. Be sure we have all of the phone numbers to reach you. Please contact your provider's office if you are unable to make an appointment.     3. Watch for warning signs. Your doctor or nurse will give you detailed warning signs to watch for and when to call for assistance. These instructions may also include educational information about your condition. If you experience any of warning signs to your health, call your doctor.           [facility]  Unless otherwise directed by your provider, please   contact [facility], our nurse care line   that is available for 24/7 assistance.     [phone] (toll-free)     Registered nurses in the [facility]   provide: appointment scheduling, clinical advisement, health education, and other advisory services.                  ** Verify the list of medication(s) below is accurate and up to date. Carry this with you in case of  emergency. If your medications have changed, please notify your healthcare provider.             Medication List      Notice     You have not been prescribed any medications.               Please bring to all follow up appointments:    1. A copy of your discharge instructions.  2. All medicines you are currently taking in their original bottles.  3. Identification and insurance card.    Please arrive 15 minutes ahead of scheduled appointment time.    Please call 24 hours in advance if you must reschedule your appointment and/or time.        Your Scheduled Appointments     Apr 20, 2017  8:30 AM CDT   Post OP with MD Jerry Coats-[facility] ([facility] [address] )    [address]  [facility] [address]   [phone]                Discharge Instructions     Future Orders    Activity as tolerated     Diet general     Questions:    Total calories:      Fat restriction, if any:      Protein restriction, if any:      Na restriction, if any:      Fluid restriction:      Additional restrictions:          Discharge Instructions         Colonoscopy     A camera attached to a flexible tube with a viewing lens is used to take video pictures.     Colonoscopy is a test to view the inside of your lower digestive tract (colon and rectum). Sometimes it can show the last part of the small intestine (ileum). During the test, small pieces of tissue may be removed for testing. This is called a biopsy. Small growths, such as polyps, may also be removed.   Why is colonoscopy done?  The test is done to help look for colon cancer. And it can help find the source of abdominal pain, bleeding, and changes in bowel habits. It may be needed once a year, depending on factors such as your:  · Age  · Health history  · Family health history  · Symptoms  · Results from any prior colonoscopy  Risks and possible complications  These include:  · Bleeding               · A puncture or tear in the colon   · Risks of  anesthesia  · A cancer lesion not being seen  Getting ready   To prepare for the test:  · Talk with your healthcare provider about the risks of the test (see below). Also ask your healthcare provider about alternatives to the test.  · Tell your healthcare provider about any medicines you take. Also tell him or her about any health conditions you may have.  · Make sure your rectum and colon are empty for the test. Follow the diet and bowel prep instructions exactly. If you dont, the test may need to be rescheduled.  · Plan for a friend or family member to drive you home after the test.     Colonoscopy provides an inside view of the entire colon.     You may discuss the results with your doctor right away or at a future visit.  During the test   The test is usually done in the hospital on an outpatient basis. This means you go home the same day. The procedure takes about 30 minutes. During that time:  · You are given relaxing (sedating) medicine through an IV line. You may be drowsy, or fully asleep.  · The healthcare provider will first give you a physical exam to check for anal and rectal problems.  · Then the anus is lubricated and the scope inserted.  · If you are awake, you may have a feeling similar to needing to have a bowel movement. You may also feel pressure as air is pumped into the colon. Its OK to pass gas during the procedure.  · Biopsy, polyp removal, or other treatments may be done during the test.  After the test   You may have gas right after the test. It can help to try to pass it to help prevent later bloating. Your healthcare provider may discuss the results with you right away. Or you may need to schedule a follow-up visit to talk about the results. After the test, you can go back to your normal eating and other activities. You may be tired from the sedation and need to rest for a few hours.  Date Last Reviewed: 11/1/2016  © 6656-9905 [employer]. [address] [place] [address], [facility]  "[place] [address]. All rights reserved. This information is not intended as a substitute for professional medical care. Always follow your healthcare professional's instructions.            Admission Information     Date & Time Provider Department CSN    4/7/2017  9:34 AM Bubba Spence MD [facility] [id number]      Care Providers     Provider Role Specialty Primary office phone    Bubba Spence MD Attending Provider Gastroenterology [phone]    Bubba Spence MD Surgeon  Gastroenterology [phone]      Your Vitals Were     BP Pulse Temp Resp Height Weight    105/55 (BP Location: Left arm, Patient Position: Lying, BP Method: Automatic) 73 97.6 °F (36.4 °C) (Axillary) 18 6' 2" (1.88 m) 93 kg (205 lb)    SpO2 BMI             99% 26.32 kg/m2         Recent Lab Values     No lab values to display.      Pending Labs     Order Current Status    Specimen to Pathology - Surgery Collected (04/07/17 1147)    Specimen to Pathology - Surgery Collected (04/07/17 1204)      Allergies as of 4/7/2017     No Known Allergies      Advance Directives     An advance directive is a document which, in the event you are no longer able to make decisions for yourself, tells your healthcare team what kind of treatment you do or do not want to receive, or who you would like to make those decisions for you.  If you do not currently have an advance directive, [facility] encourages you to create one.  For more information call:  [phone] ([phone]), [phone] ([phone]),  or log on to [email or web address].        Smoking Cessation     If you would like to quit smoking:   You may be eligible for free services if you are a [place] resident and started smoking cigarettes before September 1, 1988.  Call the Smoking Cessation Trust (SCT) toll free at (908) 900-3502 or (994) 855-6325.   Call 0-800-QUIT-NOW if you do not meet the above criteria.   Contact us via email: [email or web address]   View our " website for more information: [email or web address]        Language Assistance Services     ATTENTION: Language assistance services are available, free of charge. Please call [phone].      ATENCIÓN: Si timur araujo, tiene a jimenez disposición servicios gratuitos de asistencia lingüística. Llame al [phone].     CHÚ Ý: N?u b?n nói Ti?ng Vi?t, có các d?ch v? h? tr? ngôn ng? mi?n phí dành cho b?n. G?i s? [phone].        [facility] Sign-Up     Activating your [facility] account is as easy as 1-2-3!     1) Visit [email or web address], select Sign Up Now, enter this activation code and your date of birth, then select Next.  [email or web address]  Expires: 4/29/2017  8:53 PM      2) Create a username and password to use when you visit [facility] in the future and select a security question in case you lose your password and select Next.    3) Enter your e-mail address and click Sign Up!    Additional Information  If you have questions, please e-mail [email or web address] or call [phone] to talk to our [facility] staff. Remember, [facility] is NOT to be used for urgent needs. For medical emergencies, dial 911.          [facility] complies with applicable Federal civil rights laws and does not discriminate on the basis of race, color, national origin, age, disability, or sex.

## 2017-04-07 NOTE — ANESTHESIA RELEASE NOTE
Anesthesia Release from PACU Note    Patient: Joss Matute    Procedure(s) Performed: Procedure(s) (LRB):  COLONOSCOPY (N/A)    Anesthesia type: general    Post pain: Adequate analgesia    Post assessment: no apparent anesthetic complications and tolerated procedure well    Last Vitals:   Vitals:    04/07/17 1241   BP: (!) 105/59   Pulse: 66   Resp: 18   Temp:          Post vital signs: stable    Level of consciousness: awake and alert     Nausea/Vomiting: no nausea/no vomiting    Complications: none    Airway Patency: patent    Respiratory: unassisted    Cardiovascular: stable and blood pressure at baseline    Hydration: euvolemic

## 2017-04-07 NOTE — H&P
Short Stay Endoscopy History and Physical    PCP - Primary Doctor No  Referring Physician - Javier Cason MD  [address] [place] [address]    Procedure - Colonoscopy  ASA - per anesthesia  Mallampati - per anesthesia  History of Anesthesia problems - no  Family history Anesthesia problems -  no   Plan of anesthesia - General    HPI  31 y.o. male  Reason for procedure: Crohn's colitis follow up    ROS:  Constitutional: No fevers, chills, No weight loss  CV: No chest pain  Pulm: No cough, No shortness of breath  GI: see HPI    Medical History:  has a past medical history of Crohn's disease.    Surgical History:  has a past surgical history that includes Colonoscopy.    Family History: family history is not on file.. Otherwise no colon cancer, inflammatory bowel disease, or GI malignancies.    Social History:  reports that he has quit smoking. His smoking use included Cigarettes. He does not have any smokeless tobacco history on file. He reports that he drinks alcohol. He reports that he does not use illicit drugs.    Review of patient's allergies indicates:  No Known Allergies    Medications:   No prescriptions prior to admission.       Physical Exam:    Vital Signs:   Vitals:    04/07/17 0958   BP: 136/72   Pulse: 77   Resp: 16   Temp: 98.1 °F (36.7 °C)       General Appearance: Well appearing in no acute distress  Lungs: CTA anteriorly  Heart:  Regular rate, S1, S2 normal, no murmurs heard.  Abdomen: Soft, non tender, non distended with normal bowel sounds, no masses  Extremities: No edema    Labs:  Lab Results   Component Value Date    WBC 7.54 02/14/2007    HGB 15.2 02/14/2007    HCT 44.3 02/14/2007     (H) 02/14/2007    ALT 9 02/14/2007    AST 11 02/14/2007     02/14/2007    K 4.1 02/14/2007     02/14/2007    CREATININE 1.0 02/14/2007    BUN 13 02/14/2007    CO2 29 02/14/2007       I have explained the risks and benefits of this endoscopic procedure to the patient  including but not limited to bleeding, inflammation, infection, perforation, and death.      Bubba Spence MD

## 2017-04-07 NOTE — ANESTHESIA POSTPROCEDURE EVALUATION
"Anesthesia Post Evaluation    Patient: Joss Matute    Procedure(s) Performed: Procedure(s) (LRB):  COLONOSCOPY (N/A)    Final Anesthesia Type: general  Patient location during evaluation: PACU  Patient participation: Yes- Able to Participate  Level of consciousness: awake and alert  Post-procedure vital signs: reviewed and stable  Pain management: adequate  Airway patency: patent  PONV status at discharge: No PONV  Anesthetic complications: no      Cardiovascular status: blood pressure returned to baseline  Respiratory status: unassisted, spontaneous ventilation and room air  Hydration status: euvolemic  Follow-up not needed.        Visit Vitals    BP (!) 105/59 (BP Location: Left arm, Patient Position: Lying, BP Method: Automatic)    Pulse 66    Temp 36.4 °C (97.6 °F) (Axillary)    Resp 18    Ht 6' 2" (1.88 m)    Wt 93 kg (205 lb)    SpO2 100%    BMI 26.32 kg/m2       Pain/Ferny Score: Pain Assessment Performed: Yes (4/7/2017 12:42 PM)  Presence of Pain: denies (4/7/2017 12:42 PM)  Ferny Score: 10 (4/7/2017 12:42 PM)      "

## 2017-04-07 NOTE — ANESTHESIA PREPROCEDURE EVALUATION
04/07/2017  Joss Matute is a 31 y.o., male.    [facility] Anesthesia Evaluation    I have reviewed the Patient Summary Reports.    I have reviewed the Nursing Notes.   I have reviewed the Medications.     Review of Systems  Anesthesia Hx:  No problems with previous Anesthesia  History of prior surgery of interest to airway management or planning: Denies Family Hx of Anesthesia complications.   Denies Personal Hx of Anesthesia complications.   Social:  Non-Smoker    Hematology/Oncology:  Hematology Normal   Oncology Normal     EENT/Dental:EENT/Dental Normal   Cardiovascular:  Cardiovascular Normal     Pulmonary:  Pulmonary Normal    Renal/:  Renal/ Normal     Hepatic/GI:   crohn's   Musculoskeletal:  Musculoskeletal Normal    Neurological:  Neurology Normal    Endocrine:  Endocrine Normal    Psych:  Psychiatric Normal           Physical Exam  General:  Well nourished    Airway/Jaw/Neck:  Airway Findings: Mouth Opening: Normal Tongue: Normal  Mallampati: I  TM Distance: 4 - 6 cm  Jaw/Neck Findings:  Neck ROM: Normal ROM      Dental:  Dental Findings: In tact   Chest/Lungs:  Chest/Lungs Findings: Clear to auscultation, Normal Respiratory Rate     Heart/Vascular:  Heart Findings: Rate: Normal  Rhythm: Regular Rhythm  Sounds: Normal        Mental Status:  Mental Status Findings:  Cooperative, Alert and Oriented         Anesthesia Plan  Type of Anesthesia, risks & benefits discussed:  Anesthesia Type:  general  Patient's Preference:   Intra-op Monitoring Plan:   Intra-op Monitoring Plan Comments:   Post Op Pain Control Plan:   Post Op Pain Control Plan Comments:   Induction:   IV  Beta Blocker:  Patient is not currently on a Beta-Blocker (No further documentation required).       Informed Consent: Patient understands risks and agrees with Anesthesia plan.  Questions answered. Anesthesia consent signed with  patient.  ASA Score: 2     Day of Surgery Review of History & Physical:    H&P update referred to the surgeon.         Ready For Surgery From Anesthesia Perspective.

## 2017-04-07 NOTE — TRANSFER OF CARE
"Anesthesia Transfer of Care Note    Patient: Joss Matute    Procedure(s) Performed: Procedure(s) (LRB):  COLONOSCOPY (N/A)    Patient location: GI    Anesthesia Type: general    Transport from OR: Transported from OR on room air with adequate spontaneous ventilation    Post pain: adequate analgesia    Post assessment: no apparent anesthetic complications    Post vital signs: stable    Level of consciousness: awake    Nausea/Vomiting: no nausea/vomiting    Complications: none          Last vitals:   Visit Vitals    /72    Pulse 77    Temp 36.7 °C (98.1 °F)    Resp 16    Ht 6' 2" (1.88 m)    Wt 93 kg (205 lb)    SpO2 96%    BMI 26.32 kg/m2     "

## 2017-04-12 ENCOUNTER — TELEPHONE (OUTPATIENT)
Dept: GASTROENTEROLOGY | Facility: CLINIC | Age: 32
End: 2017-04-12

## 2017-04-12 NOTE — TELEPHONE ENCOUNTER
----- Message from Kathleen Swain MA sent at 4/12/2017 10:33 AM CDT -----  Contact: Dr Ezra Bryson with [facility] [phone]  VERONICA Spence   Wanting to discuss this mutual pt with Dr Spence, please call Dr Ezra Bryson with [facility] [phone]

## 2017-04-13 ENCOUNTER — TELEPHONE (OUTPATIENT)
Dept: ENDOSCOPY | Facility: HOSPITAL | Age: 32
End: 2017-04-13

## 2017-04-13 NOTE — TELEPHONE ENCOUNTER
Called Britany Dutta and left message on his cell phone. Await call back to discuss patient care.

## 2017-04-17 ENCOUNTER — TELEPHONE (OUTPATIENT)
Dept: GASTROENTEROLOGY | Facility: CLINIC | Age: 32
End: 2017-04-17

## 2017-04-17 NOTE — TELEPHONE ENCOUNTER
----- Message from Bubba Spence MD sent at 4/16/2017  3:29 PM CDT -----  I don't see this patient yet scheduled for an appt. He is waiting for us to call him. Also he has appt with Dr. Cason this Thursday but check with Dr. Cason and patient- we can see him the following Thursday on same day if it works for Dr. Cason  Please schedule him asap though let me know if issues with records delaying scheduling    Thanks  SS

## 2017-04-17 NOTE — TELEPHONE ENCOUNTER
Called and spoke with pt  I was trying to ask when his first visit with Dr Bryson was and he stated he was busy and unable to talk. He asked if he could call back and I told him yes but I was leaving within 30 mins. He took my direct line and said he would call back in 20 mins

## 2017-04-24 ENCOUNTER — OFFICE VISIT (OUTPATIENT)
Dept: GASTROENTEROLOGY | Facility: CLINIC | Age: 32
End: 2017-04-24
Payer: COMMERCIAL

## 2017-04-24 ENCOUNTER — TELEPHONE (OUTPATIENT)
Dept: PHARMACY | Facility: CLINIC | Age: 32
End: 2017-04-24

## 2017-04-24 ENCOUNTER — CLINICAL SUPPORT (OUTPATIENT)
Dept: INFECTIOUS DISEASES | Facility: CLINIC | Age: 32
End: 2017-04-24
Payer: COMMERCIAL

## 2017-04-24 VITALS
WEIGHT: 207.25 LBS | TEMPERATURE: 98 F | HEIGHT: 73 IN | RESPIRATION RATE: 16 BRPM | HEART RATE: 79 BPM | BODY MASS INDEX: 27.47 KG/M2 | DIASTOLIC BLOOD PRESSURE: 77 MMHG | SYSTOLIC BLOOD PRESSURE: 128 MMHG

## 2017-04-24 DIAGNOSIS — K50.813 CROHN'S DISEASE OF BOTH SMALL AND LARGE INTESTINE WITH FISTULA: Primary | ICD-10-CM

## 2017-04-24 DIAGNOSIS — K50.813 CROHN'S DISEASE OF BOTH SMALL AND LARGE INTESTINE WITH FISTULA: ICD-10-CM

## 2017-04-24 DIAGNOSIS — K60.3 ANAL FISTULA: ICD-10-CM

## 2017-04-24 PROCEDURE — 99999 PR PBB SHADOW E&M-EST. PATIENT-LVL I: CPT | Mod: PBBFAC,,,

## 2017-04-24 PROCEDURE — 90472 IMMUNIZATION ADMIN EACH ADD: CPT | Mod: S$GLB,,, | Performed by: INTERNAL MEDICINE

## 2017-04-24 PROCEDURE — 90670 PCV13 VACCINE IM: CPT | Mod: S$GLB,,, | Performed by: INTERNAL MEDICINE

## 2017-04-24 PROCEDURE — 90471 IMMUNIZATION ADMIN: CPT | Mod: S$GLB,,, | Performed by: INTERNAL MEDICINE

## 2017-04-24 PROCEDURE — 99999 PR PBB SHADOW E&M-EST. PATIENT-LVL III: CPT | Mod: PBBFAC,,, | Performed by: INTERNAL MEDICINE

## 2017-04-24 PROCEDURE — 99215 OFFICE O/P EST HI 40 MIN: CPT | Mod: S$GLB,,, | Performed by: INTERNAL MEDICINE

## 2017-04-24 PROCEDURE — 1160F RVW MEDS BY RX/DR IN RCRD: CPT | Mod: S$GLB,,, | Performed by: INTERNAL MEDICINE

## 2017-04-24 PROCEDURE — 90715 TDAP VACCINE 7 YRS/> IM: CPT | Mod: S$GLB,,, | Performed by: INTERNAL MEDICINE

## 2017-04-24 RX ORDER — ACETAMINOPHEN 325 MG/1
325 TABLET ORAL EVERY 6 HOURS PRN
COMMUNITY
End: 2017-07-25

## 2017-04-24 NOTE — PROGRESS NOTES
"                    [facility]          Inflammatory Bowel Disease New Patient Consultation Note            Dr. Bubba Spence    TODAY'S VISIT DATE:  4/24/2017    Reason for Consult:    Chief Complaint   Patient presents with    Crohn's Disease       PCP: [facility]  [id number] / [place] [address]    Referring MD:   Dr. Javier Cason    History of Present Illness:    Dear. Dr. Javier Cason    Thank you for requesting a consultation on your patient Joss Matute who is a 31 y.o. male seen today at the [facility] on 04/24/2017 for inflammatory bowel disease- Crohn's disease.  Pertinent past medical history includes perianal fistula with abscess with possible fistula repairs in the past.     As you know, Joss Matute was doing well until December 1996 when he developed perianal abscess at which time it was drained in the ER and likely received antibiotics.  About a year later in December 1997 he had a recurrent perianal abscess with vomiting, weight loss and abdominal pain and presented with perianal fistula with abscess. He was diagnosed with Crohn's disease but unclear what part of his GI tract was involved besides the perianal area.  However per clinic notes he had a colonoscopy in 1998 which showed moderately severe ileitis with biopsies of the right/transverse/left colon and rectum with mild nonspecific acute and chronic reactive changes.  The transverse colon polyp (likely removed) pathology showed it to be "edematous with mild glandular dysplasia."  I believe possibly the word "edematous" was a typo for adenomatous but don't have the report to support this.  He recalls taking prednisone for at least 4-5 months and then was maintained on oral pentasa.  By 2002 at age 16 yo he stopped all medications for his Crohn's disease. In 2004 he was hospitalized for CD exacerbation at which time he had dehydration. Between 2004 to 2007 he did not see " any GI doctors and also not taking any medications.         In 2/2007 he presents to his gastroenterologist with symptoms of RLQ abdominal cramping, increasing fatigue, and increased bowel frequency (2-3 BMs/day) with blood and mucous. In 2/2007 colonoscopy showed patchy area of mucosa in the distal 4 cm of the terminal ileum though biopsies were normal and there was moderate amount of semisolid stool in the entire colon. AT the time he reported that he went to the GI doctor because he was coming off of his parent's insurance and needed to establish care with an MD per parent request.  He again after that did not follow up or start any medications.  In 11/2008 patient had a perianal abscess with drainage and was sent to the ER for asap evaluation but CRS.  The abscess drained spontaneously and there is notation of a perianal fistula as well.  Patient did not wish to take any medications for his Crohn's disease.   At his clinic visit with CRS 12/2008 he was doing well but still having perianal drainage and was diagnosed with a  chronic fistula draining with multiple old fistulas. From 2009 to 2016 the fistula would have chronic drainage and about every other year would have a recurrent perianal abscess that would spontaneously drain.  He recalls going to the ER for incision and drainage of abscess about 3-4 times.  In one of the notes there is mention of a SBFT in 5/2012 at [facility].  In 10/2016 patient got  and decided to seek medical care again for his Crohn's disease in 11/2016. At his clinic visit in 11/2016 he had recurrent perianal fistula/abscess and reported 4 BMs/day with urgency, blood, and mucous at which time he established care with Dr. Cisneros.  Colonoscopy by Dr. Cisneros 1/6/17 showed perianal fistula with normal colon and terminal ileum and normal histology on all biopsies.  MRE 1/18/17 showed no definitive evidence for abnormal mucosal enhancement within the small bowel with  "suspicious early fistulous tract/fissure within the distal rectum/perianal region tracking along the 9 oclock position rotated into the 6 oclock position and exiting along the anal canal/fissure and no evidence for abnormal enhancing structures within the abdomen/pelvis.  At his clinic visit 17 humira was discussed with plans to start after surgical intervention.   Dr. Cason saw patient and performed EUA and had placement of setons on 3/15/17.  Dr. Cason referred patient to have colonoscopy and clinic visit with Dr. Spence and then plans for LIFT or medical management and eventual seton removal.  I did a colonoscopy 17 which showed perianal skin tag, seton intact and small rectal ulcer with biopsies consistent with patchy active colitis with features of chronic mucosal injury.  He is currently having 3 soft BMs/day with no blood, urgency or nocturnal bowel movements. He reports some minimal pus drainage when he wipes but no pain or fluctuance.  He has some mild rectal discomfort with bowel movements. He reports occasional itchy rash with red bumps on his arms which comes and go but not too bothersome.  Patient has no other gastrointestinal/constitutional complaints including no fevers or chills, weight loss, nausea/vomiting (including no hematemesis), dysphagia, abdominal pain.  Also patient does not have any extraintestinal manifestations of their inflammatory bowel disease including no eye pain/redness, joint problems, oral ulcers.    Previous Surgeries:  previous fistula repair Xs 2  3/15/2017: EUA with seton placement    Pertinent Endoscopy/Imagin Colonoscopy: (per clinic note path only) showed moderately severe ileitis; right, transverse, left, and rectum showed mild non-specific acute and chronic reactive changes; " transverse polyp path showed it to be edematous with mild glandular dysplasia"  2007 Colonoscopy: patchy area of mucosa in the ileum was nodular and granular (most " distal 4 cm of TI) (path: normal); moderate amount of semisolid stool in the entire colon  5/23/2012 SBFT: normal  1/6/2017 Colonoscopy: fistula in the mauricio-anal area with no active drainage; normal mucosa in the whole colon and TI (no biopsies)  1/18/2017 MRE: no definitive evidence for abnormal mucosal enhancement within the small bowel; suspicious early fistulous tract/fissure within the distal rectum/perianal region tracking along the 9 oclock position rotated into the 6 oclock position and exiting along the anal canal/fissure; no evidence for abnormal enhancing structures within the abdomen/pelvis  4/7/2017 Colonoscopy: perianal skin tag; small rectal ulcer in the rectum (seen on retroflexion) (path: ulcer and adjacent patchy active colitis and features of chronic mucosal injury), remainder colon normal (path: normal); normal TI (path: normal); seton intact     Pertinent Labs:  [facility] IBD testing--cannot read the original  2/6/17 T spot test negative  2/6/17 HBsAg negative  No results found for: STOOLCULTURE, [id number], EGGLXVAUFA9I, CDIFFICILEAN, CDIFFTOX, CDIFFICILEBY  No results found for: CRP  No results found for: [id number]  No results found for: HEPBIGM, HEPBCAB, HEPBSURFABQU  No results found for: VARICELLAZOS, VARICELLAINT  No results found for: NIL, TBAG, TBAGNIL, MITOGENNIL, TBGOLD  No results found for: TPMTRESULT  No results found for: ANSADAINIT, INFLIXIMAB, INFLIXINTERP    Prior IBD Therapies:  Prednisone  Pentasa    Current IBD Therapies:  None    Vaccinations:  Flu shot: not up to date--recommended Fall 2017  Chickenpox status/Varicella vaccine: had chicken pox as a child  No results found for: VARICELLAZOS, VARICELLAINT  Tetanus: not up to date--today  Pneumonia vaccine 13: never had--today  Pneumonia vaccine 23: never had, will order after receiving Prevnar  Hepatitis B: unsure if had all three injections  No results found for: HEPBSAB  Hepatitis A: NA  HPV: NA   Meningococcal:  NA    NSAID use/indication:  Occasional ibuprofen    Narcotic use:  none    Alternative/Complementary Meds for IBD:  none    Review of Systems   Constitutional: Negative for chills, fever and weight loss.   HENT:        No oral ulcers, dysphagia, oral thrush   Eyes: Negative for blurred vision, pain and redness.   Respiratory: Negative for cough and shortness of breath.    Cardiovascular: Negative for chest pain.   Gastrointestinal: Positive for abdominal pain (once a week, abdominal discomfort) and vomiting (one episode of headache and vomiting with resolution- pt attributes to paint fumes). Negative for heartburn and nausea.   Genitourinary: Negative for dysuria and hematuria.   Musculoskeletal: Negative for back pain and joint pain.   Skin: Negative for rash.   Psychiatric/Behavioral: Negative for depression. The patient is not nervous/anxious and does not have insomnia.        Medical/Surgical History:    has a past medical history of Chronic diarrhea and Crohn's disease.   has a past surgical history that includes Colonoscopy; Colonoscopy (N/A, 4/7/2017); Abscess drainage; Anal fistulotomy; and Upper gastrointestinal endoscopy.    Family History: family history includes ALS in his paternal grandfather. There is no history of Celiac disease, Cirrhosis, Colon cancer, Colon polyps, Crohn's disease, Cystic fibrosis, Esophageal cancer, Hemochromatosis, Inflammatory bowel disease, Irritable bowel syndrome, Liver cancer, Liver disease, Rectal cancer, Stomach cancer, Ulcerative colitis, Roby's disease, Lupus, Multiple sclerosis, Tuberculosis, Rheum arthritis, Scleroderma, or Lymphoma..     Social History:  reports that he quit smoking about 18 months ago. His smoking use included Cigarettes. He has never used smokeless tobacco. He reports that he drinks alcohol. He reports that he does not use illicit drugs.    Review of patient's allergies indicates:  No Known Allergies    Outpatient Prescriptions Marked as Taking  for the 4/24/17 encounter (Office Visit) with Bubba Spence MD   Medication Sig Dispense Refill    acetaminophen (TYLENOL) 325 MG tablet Take 325 mg by mouth every 6 (six) hours as needed for Pain.       Vital Signs:  BP: 128/77 Temp: 98.2 °F (36.8 °C) Pulse: 79 (ox:97) Resp: 16  Weight: 94 kg (207 lb 3.7 oz)    Physical Exam   Constitutional: He is oriented to person, place, and time. He appears well-developed.   HENT:   Mouth/Throat: Oropharynx is clear and moist. No oral lesions.   Eyes: Conjunctivae are normal. Pupils are equal, round, and reactive to light.   Cardiovascular: Normal rate and regular rhythm.    Pulmonary/Chest: Effort normal and breath sounds normal.   Abdominal: Soft. There is no tenderness.   Neurological: He is alert and oriented to person, place, and time.   Skin: No rash noted.   Psychiatric: He has a normal mood and affect.   Nursing note and vitals reviewed.    Labs:  No results found for: STOOLCULTURE, [id number], [id number], CDIFFICILEAN, CDIFFTOX, CDIFFICILEBY  Lab Results   Component Value Date    WBC 7.54 02/14/2007    HGB 15.2 02/14/2007    HCT 44.3 02/14/2007    MCV 89.3 02/14/2007     (H) 02/14/2007    ALT 9 02/14/2007    AST 11 02/14/2007    ALKPHOS 55 02/14/2007    BILITOT 0.5 02/14/2007    SEDRATE 0 02/14/2007     No results found for: HEPBIGM, HEPBCAB, HEPBSAB, HEPBSURFABQU  No results found for: VARICELLAZOS, VARICELLAINT  No results found for: NIL, TBAG, TBAGNIL, MITOGENNIL, TBGOLD  No results found for: TPMTRESULT  No results found for: ANSADAINIT, INFLIXIMAB, INFLIXINTERP    Assessment/Plan:  Joss Matute is a 31 y.o. male with Crohn's disease: ileum, rectal ulcer and perianal fistula/abscess; no active ileitis now (on last colonoscopy 1998 but normal ileum 2007, 1/2017, 4/2017); 4/2017 colonoscopy with rectal ulcer.  He began with symptoms of a perirectal abscess in December 1996 that was initially drained in the ED.  The abscess recurred in December 1997  "and was associated with vomiting, weight loss, and abdominal pain.  His initial colonoscopy in 1998 (no actual record just reported in clinic note) showed moderately severe ileitis with biopsies of the right/transverse/left colon and rectum with mild nonspecific acute and chronic reactive changes.  The transverse colon polyp (likely removed) pathology showed it to be "edematous with mild glandular dysplasia."  At that time he was finally diagnosed with Crohn's disease but unsure of the exact location.  Initially he recalls being given prednisone for 4-5 months and pentasa for maintenance.  By 2002, age 17, he stopped all medications and did not have any GI follow up.  He had a hospital admission for a "flare" with dehydration in 2004 and again was not started on any medications.  He was seen in 2007 for RLQ abdominal cramping, increasing fatigue, and increased bowel frequency with blood and mucous.  He had a repeat colonoscopy in 2/2007 that showed patchy area of mucosa in the distal 4 cm of the TI with normal biopsies.  He had recurrent abscess/fistula in 2008 that spontaneously drained and was seen by CRS and did not wish to take any medications.  He reports intermittent abscess/perianal fistula from [id number] with chronic drainage that would cause he to intermittently seek treatment of I&D in the ER.  In 10/2016 he established care with Dr. Bryson when he had a recurrent perianal fistula who planned to start patient on humira after surgical intervention.  He was seen by Dr. Cason on 3/15/2017 and had an EUA with seton placement.  He most recent colonoscopy on 4/7/2017 performed by Dr. Spence showed showed perianal skin tag, seton intact and small rectal ulcer with biopsies consistent with patchy active colitis with features of chronic mucosal injury.  He currnetly continues with 3 soft BMs/day with no blood, urgency, or nocturnal bowel movements.  The seton remains in place and he reports minimal purulent " drainage with wiping and mild discomfort with bowel movements.      Patient currently only has active disease involving the perianal area primarily manifesting with chronic perianal fistula with recurrent abscess. Abscess drained and he has setons since 3/15/17.  He has a single rectal ulcer which confirmed chronicity of inflammation.  He has no SB inflammation on MRE or ileoscopy at time of colonoscopy. I don't know with time if he will have recurrent ileitis though he has not had any active ileitis since colonoscopy in 1998. In fact the colonoscopy in 2007 endoscopically mentioned nodular ileum but biopsies did not show inflammation.  At this time we will proceed with medical treatment with Humira to hopefully close the fistula permanently and prevent recurrent abscess.  He will continue with seton until we have at least 12 weeks of Humira on board and given there is very limited visualized inflammation we will see response based on closer of fistula after seton removal and tracking of no recurrent abscess.  We discussed all options for treatments today but opted for humira given patient already has been signed up for the program and family with the medication.  We discussed the need to optimize treatment and will add oral MTX weekly to the humira and get humira levels/abs proactively as well.      # Crohn's disease: ileum, rectal ulcer and perianal fistula/abscess; no active ileitis now (on last colonoscopy 1998 but normal ileum 2007, 1/2017, 4/2017); 4/2017 colonoscopy with rectal ulcer  - - I had a long discussion with patient regarding epidemiology, potential causes/triggers (avoiding NSAIDS, antibiotics if possible, stress and smoking), diagnosis, management goals and treatment options   - discussed MOA, route, risks of humira in detail today; pt was previously already signed up for humira complete program so we will change me as prescribing MD to be sure no confusion  - start humira- prescription sent to  "pharmacy today and pt will get instructions with our specialty pharmacist  - basic labs: CBC, CMP, ESR, CRP, vitamin B12  - drug monitoring labs:  T spot negative at OSH (2/6/17)- repeat T spot in 2/2018 if no TB risk factors prior to that; HBsAg negative (2/2016)- will get HBsAg, HBcAb, HbsAb today  - repeat flex sig after 6 mos of humira and will also see if response based on fistula closure and hopefully no recurrent perianal abscess    # Ex-smoker  - pt quit fall 2015  - will remind patient at next visit that Crohn's be exacerbated with smoking and prevent meds from working  - pt is - not sure if any second hand exposure to smoke?    # Colon sporadic adenoma in transverse colon- otherwise average risk:    - on colonoscopy 1998- report not available but per clinic note "transverse polyp path showed it to be edematous with mild glandular dysplasia"; I wonder if the "edematous" is really supposed to be "adenomatous"  - I am concerned about whether this is a sporadic adenoma in young patient or DALM.  Unlikely to be polypoid lesion in setting of colitis given transverse colon was never previously involved  - colonoscopy 1/2017 and 4/2017- no polyps found  - needs routine colonoscopy for surveillance every 5 years assuming this may have been a sporadic adenoma    # IBD specific health maintenance- long term immunosuppression:  Opthamologic exam recommended yearly    Dermatologic exam recommended yearly     Bone health:  Calcium 7716-4696 mg daily and vitamin D 800 IU daily  Risk of osteopenia/osteoporosis:  Risks factors: none  Vitamin D: No results found for: [id number]  Vitamin D level today    Vaccine counseling:  - No LIVE VACCINES--discussed in clinic today  - VZV IgG, HBsAb today   - Tetanus every 10 years--today  - Flu shot yearly recommended Fall 2017  - Pneumonia vaccine 13--today  - Pneumonia vaccine 23--will order at next visit    Follow up: 2 months with Dr. Spence    Thank you again for sending " Joss Matute to see Dr. Bubba Spence today at the [facility]. Please don't hesitate to contact Dr. Spence if there are any questions regarding this evaluation, or if you have any other patients with inflammatory bowel disease for whom you would like a consultation. You can reach Dr. Spence at [phone] or by email at [email or web address]    Bubba Spence MD  Department of Gastroenterology  Medical Director, Inflammatory Bowel Disease

## 2017-04-24 NOTE — PATIENT INSTRUCTIONS
Instructions:  - labs today  - sign Humira paperwork, patient already has a Humira complete savings card with Dr. Bryson's office  - Pneumonia vaccine and Tetanus vaccine today  - we will discuss oral MTX at next clinic visit  - we will discuss seton removal at next clinic visit  - sign up for [facility]  - Avoid all NSAIDs (Advil, Ibuprofen, Motrin, Aspirin, Naprosyn, Aleve)  - Yearly Eye exam  - Yearly Skin exam--wear sun block and hats  - Use antibiotics with caution  - Vaccines needed:      Flu shot yearly--will order Fall       Tetanus every 10 years--today      Hepatitis B series (three injections)      Pneumonia 13  (PCV13) vaccine initial--today      Pneumonia 23 (PPSV23) vaccine 1 year after PCV13, then an additional dose in 5          years, then again at age 66--will order in future  - Follow up with Dr. Spence in 2 months    Adalimumab (Humira): Biologics - Anti-TNF Agent  - Mechanism of action:  humira blocks TNF alpha which plays a role in the inflammatory process for inflammatory bowel disease  - Labs     - needed to determine safety of starting the medication; these may have been already       drawn or will be drawn today and include TB test, hepatitis B testing, basic labs    - our office will call you once these labs are back to let you know if it is safe to start the      Humira injections    - Repeat labs will be drawn every 4-6 months to monitor for side effects    - TB test (Quantiferon Gold) will be checked yearly or sooner if needed    Humira Dosage:  - Loading Dose: 160 mg Subcutaneous on week 0 (4 pens or pre-filled syringes), 80 mg SC on week 2 (2 pens or prefilled syringes),   - Maintenance Dosin mg SC every other week (1 pen or prefilled syringe)    Risks of Humira:  Stop therapy due to adverse event= 10% (10/100)    Please call us immediately if you develop any of the below problems    Allergic reactions  - <2% (2/100) develop injection site reactions  - RARE- hives (rash),  difficulty breathing, chest pain/tightness, high or low blood pressure, swelling of the face and hands, fever or chills    Serious Infections= 3% (3/100)  fever, tiredness, flu, open sores, warm red painful skin    Blood Disorders  fever that doesn't go away, bruising, bleeding, severe paleness    Non-Hodgkins Lymphoma=0.06% (6/10,000)    Drug related lupus-like reaction= 1% (1/100)  chest discomfort or pain that does not go away, shortness of breath, joint pain, rash on the cheeks or arms that gets worse in the sun    Psoriasis  new or worsening red scaly patches or raised bumps on the skin that are filled with pus     Case Reports Only: Multiple Sclerosis and Guillain [place] Syndrome  Numbness/weakness/tingling of your hands and feet, changes in your vision or seizures    Case Reports Only: New or worsening Congestive Heart Failure  shortness of breath, swelling in your ankles or feet, sudden weight gain    Case Reports Only: Serious Liver Injury  jaundice (yellow skin or eyes), dark brown urine, right-sided abdominal pain, fever, severe itchiness    Vaccine Counseling  See above    - All Immunizations ideally should be up to date prior to starting therapy--NO LIVE vaccines during therapy or 8 weeks prior to therapy  - Live Vaccines Include:   --Intranasal Influenza A/B  --Measles, Mumps, Rubella (MMR)  --Rotavirus  --Typhoid (oral)  --Vaccina (Smallpox)  --Varicella (Chicken Pox)  --Yellow Fever  --Zoster

## 2017-04-24 NOTE — MR AVS SNAPSHOT
[facility]  [address]  [facility] [address]  Phone: [phone]  Fax: [phone]                  Joss Matute   2017 10:00 AM   Office Visit    Description:  Male : 1985   Provider:  Bubba Spence MD   Department:  [facility]           Reason for Visit     Crohn's Disease           Diagnoses this Visit        Comments    Crohn's disease of both small and large intestine with fistula    -  Primary            To Do List           Future Appointments        Provider Department Dept Phone    2017 11:45 AM LAB, SAME DAY [facility] [address] [phone]    2017 1:00 PM INJECTION, INFECTIOUS DISEASES [facility] ID Injection Room [phone]    2017 1:10 PM INJECTION, INFECTIOUS DISEASES [facility] ID Injection Room [phone]    2017 1:00 PM Bubba Spence MD [facility] [phone]      Goals (5 Years of Data)     None       These Medications        Disp Refills Start End    adalimumab (HUMIRA PEN [facility] START) PnKt injection 6 each 0 2017     160 mg (4 pens) week 0, 80 mg (2 pens) week 2    Pharmacy: [facility] [place] - [address] [facility] Ph #: [phone]       Prior authorization:  Payer waiting for response    adalimumab (HUMIRA PEN) PnKt injection 2 each 5 2017    Inject 0.8 mLs (40 mg total) into the skin every 14 (fourteen) days. - Subcutaneous    Pharmacy: [facility] [place] [facility] [address] Ph #: [phone]       Prior authorization:  Closed - Prior Authorization not required for patient/medication      [facility]     [facility] Nurse Care Line -  Assistance  Unless otherwise directed by your provider, please contact [facility], our nurse care line that is available for  assistance.     Registered nurses in the [facility] Center provide: appointment scheduling,  "clinical advisement, health education, and other advisory services.  Call: [phone] (toll free)               Medications           Message regarding Medications     Verify the changes and/or additions to your medication regime listed below are the same as discussed with your clinician today.  If any of these changes or additions are incorrect, please notify your healthcare provider.        START taking these NEW medications        Refills    adalimumab (HUMIRA PEN [facility] START) PnKt injection 0    Sig: 160 mg (4 pens) week 0, 80 mg (2 pens) week 2    Class: Normal    Prior authorization:  Payer waiting for response    adalimumab (HUMIRA PEN) PnKt injection 5    Sig: Inject 0.8 mLs (40 mg total) into the skin every 14 (fourteen) days.    Class: Normal    Route: Subcutaneous    Prior authorization:  Closed - Prior Authorization not required for patient/medication           Verify that the below list of medications is an accurate representation of the medications you are currently taking.  If none reported, the list may be blank. If incorrect, please contact your healthcare provider. Carry this list with you in case of emergency.           Current Medications     acetaminophen (TYLENOL) 325 MG tablet Take 325 mg by mouth every 6 (six) hours as needed for Pain.    adalimumab (HUMIRA PEN [facility] START) PnKt injection 160 mg (4 pens) week 0, 80 mg (2 pens) week 2    adalimumab (HUMIRA PEN) PnKt injection Inject 0.8 mLs (40 mg total) into the skin every 14 (fourteen) days.           Clinical Reference Information           Your Vitals Were     BP Pulse Temp Resp Height Weight    128/77 (BP Location: Right arm, Patient Position: Sitting) 79 98.2 °F (36.8 °C) 16 6' 1" (1.854 m) 94 kg (207 lb 3.7 oz)    BMI                27.34 kg/m2          Blood Pressure          Most Recent Value    BP  128/77      Allergies as of 4/24/2017     No Known Allergies      Immunizations Administered on Date of Encounter - " 4/24/2017     None      Orders Placed During Today's Visit     Future Labs/Procedures Expected by Expires    C-reactive protein  4/24/2017 6/23/2018    CBC auto differential  4/24/2017 6/23/2018    Comprehensive metabolic panel  4/24/2017 6/23/2018    Hepatitis B core antibody, total  4/24/2017 6/23/2018    Hepatitis B surface antibody  4/24/2017 6/23/2018    Hepatitis B surface antigen  4/24/2017 6/23/2018    IgA  4/24/2017 6/23/2018    Sedimentation rate, manual  4/24/2017 6/23/2018    Tissue transglutaminase, IgA  4/24/2017 6/23/2018    TPMT Activity Profile, RBC  4/24/2017 6/23/2018    Varicella zoster antibody, IgG  4/24/2017 6/23/2018    Vitamin B12  4/24/2017 6/23/2018    Vitamin D  4/24/2017 6/23/2018    Pneumococcal Conjugate Vaccine (13 Valent) (IM)  As directed 4/24/2018    Tdap vaccine greater than or equal to 6yo IM  As directed 4/24/2018      Instructions    Instructions:  - labs today  - sign Humira paperwork, patient already has a Humira complete savings card with Dr. Bryson's office  - Pneumonia vaccine and Tetanus vaccine today  - we will discuss oral MTX at next clinic visit  - we will discuss seton removal at next clinic visit  - sign up for [facility]  - Avoid all NSAIDs (Advil, Ibuprofen, Motrin, Aspirin, Naprosyn, Aleve)  - Yearly Eye exam  - Yearly Skin exam--wear sun block and hats  - Use antibiotics with caution  - Vaccines needed:      Flu shot yearly--will order Fall 2017      Tetanus every 10 years--today      Hepatitis B series (three injections)      Pneumonia 13  (PCV13) vaccine initial--today      Pneumonia 23 (PPSV23) vaccine 1 year after PCV13, then an additional dose in 5          years, then again at age 65--will order in future  - Follow up with Dr. Spence in 2 months    Adalimumab (Humira): Biologics - Anti-TNF Agent  - Mechanism of action:  humira blocks TNF alpha which plays a role in the inflammatory process for inflammatory bowel disease  - Labs     - needed to determine safety  of starting the medication; these may have been already       drawn or will be drawn today and include TB test, hepatitis B testing, basic labs    - our office will call you once these labs are back to let you know if it is safe to start the      Humira injections    - Repeat labs will be drawn every 4-6 months to monitor for side effects    - TB test (Quantiferon Gold) will be checked yearly or sooner if needed    Humira Dosage:  - Loading Dose: 160 mg Subcutaneous on week 0 (4 pens or pre-filled syringes), 80 mg SC on week 2 (2 pens or prefilled syringes),   - Maintenance Dosin mg SC every other week (1 pen or prefilled syringe)    Risks of Humira:  Stop therapy due to adverse event= 10% (10/100)    Please call us immediately if you develop any of the below problems    Allergic reactions  - <2% (2/100) develop injection site reactions  - RARE- hives (rash), difficulty breathing, chest pain/tightness, high or low blood pressure, swelling of the face and hands, fever or chills    Serious Infections= 3% (3/100)  fever, tiredness, flu, open sores, warm red painful skin    Blood Disorders  fever that doesn't go away, bruising, bleeding, severe paleness    Non-Hodgkins Lymphoma=0.06% (6/10,000)    Drug related lupus-like reaction= 1% (1/100)  chest discomfort or pain that does not go away, shortness of breath, joint pain, rash on the cheeks or arms that gets worse in the sun    Psoriasis  new or worsening red scaly patches or raised bumps on the skin that are filled with pus     Case Reports Only: Multiple Sclerosis and Guillain Concord Syndrome  Numbness/weakness/tingling of your hands and feet, changes in your vision or seizures    Case Reports Only: New or worsening Congestive Heart Failure  shortness of breath, swelling in your ankles or feet, sudden weight gain    Case Reports Only: Serious Liver Injury  jaundice (yellow skin or eyes), dark brown urine, right-sided abdominal pain, fever, severe  itchiness    Vaccine Counseling  See above    - All Immunizations ideally should be up to date prior to starting therapy--NO LIVE vaccines during therapy or 8 weeks prior to therapy  - Live Vaccines Include:   --Intranasal Influenza A/B  --Measles, Mumps, Rubella (MMR)  --Rotavirus  --Typhoid (oral)  --Vaccina (Smallpox)  --Varicella (Chicken Pox)  --Yellow Fever  --Zoster                                     Language Assistance Services     ATTENTION: Language assistance services are available, free of charge. Please call [phone].      ATENCIÓN: Si karinala gayle, tiene a jimenez disposición servicios gratuitos de asistencia lingüística. Llame al [phone].     MAGALIE Ý: N?u b?n nói Ti?ng Vi?t, có các d?ch v? h? tr? ngôn ng? mi?n phí dành cho b?n. G?i s? [phone].         [facility] complies with applicable Federal civil rights laws and does not discriminate on the basis of race, color, national origin, age, disability, or sex.

## 2017-04-24 NOTE — LETTER
April 24, 2017      Javier Cason MD  [address] [facility] [address]           [facility]  [address]  [facility] [address]  Phone: [phone]  Fax: [phone]          Patient: Joss Matute   MR Number: [id number]   YOB: 1985   Date of Visit: 4/24/2017       Dear Dr. Javier Cason:    Thank you for referring Joss Matute to me for evaluation. Attached you will find relevant portions of my assessment and plan of care.    If you have questions, please do not hesitate to call me. I look forward to following Joss Matute along with you.    Sincerely,    Kael Cali, PharmD    Enclosure  CC:  No Recipients    If you would like to receive this communication electronically, please contact [email or web address] or [phone] to request more information on [employer] access.    For providers and/or their staff who would like to refer a patient to [facility], please contact us through our one-stop-shop provider referral line, [facility], at [phone].    If you feel you have received this communication in error or would no longer like to receive these types of communications, please e-mail [email or web address]

## 2017-04-25 ENCOUNTER — TELEPHONE (OUTPATIENT)
Dept: PHARMACY | Facility: CLINIC | Age: 32
End: 2017-04-25

## 2017-04-25 NOTE — TELEPHONE ENCOUNTER
DOCUMENTATION ONLY  Prior Authorization Humira Pen 40mg/0.8mL approved on 04/25/2017.   Approval date: 03/01/2017 to 02/20/2018  Case ID# [id number]  Co-pay: $5    Cost Exceed Maximum:   Humira Pen Crohns Starter Kit  Approved on 04/25/2017  Approval date: 04/25/2017 to 04/25/2018  Case ID# [id number]    QTY: 6 pens (Starter Kit)  Approval date:   Case ID# [id number]    Humira co-pay coupon card information:   RxBIN: [id number]  RxPCN: [id number]  RxGRP: [id number]  RxID: [id number]  Suf: 01  For more information, please visit  [email or web address] or call [phone]

## 2017-05-01 NOTE — TELEPHONE ENCOUNTER
Initial Humira 40mg/0.8ml Pen Injection consult completed on 17. Patient will start Humira 40mg/0.8ml Pen Injection on 17. Address confirmed, CC on file. Confirmed 2 patient identifiers - name and . Therapy Appropriate.    Counseled patient on administration directions:  - Starter: Inject Humira 160mg (4 pens) into the skin on Day 1 and 80 mg (2 pens) Day 15.   - Maintenance: Then, Inject Humira 40mg (1pen) into the skin every 14 days.- Take out of the refrigerator 30-60 minutes prior to injection.  - Wash hands before and after injection.  - Monthly RX will come with gauze, bandaids, and alcohol swabs.  - Patient may inject in either the tops of the thighs, abdomen- but at least 2 inches away from her belly button, or the outer part of her upper arm.  Patient was instructed to rotate injections sites.  - Patient is to wipe down the injection site with the alcohol pad, wait to dry.  Gently squeeze the area of the cleaned skin and hold it firmly.   Place the pen flat against the raised area of skin that is being squeezed, then push down on the button and hold for 10-15 seconds, until the window has gone from clear to yellow.  - Patient should rotate injection sites.   - Patient will use sharps container; once full, per [place] law, she/ he may lock the sharps container and place in her trash. She/ he can then contact the Pharmacy and we will replace the sharps at no additional charge.    Patient was counseled on possible side effects:  - Injection site reaction: redness, soreness, itching, bruising, which should resolve within 3-5 days.  - Increased risk for infections.  If patient becomes sick, patient is to hold Humira  use until she/ he is better.     DDIs:  Medication list reviewed and potential DDIs addressed.    Patient verbalized understanding. Compliance stressed. Patient advised to keep a calendar marking dates of injections to ensure better compliance. Patient advised to call myself or provider  should any questions arise. Patient plans to start Humira on 5/1/17. Consultation included: indication; goals of treatment; administration; storage and handling; side effects; how to handle side effects; the importance of compliance; how to handle missed doses; the importance of laboratory monitoring; the importance of keeping all follow up appointments.  Patient understands to report any medication changes to [facility] and provider. All questions answered and addressed to patients satisfaction. I will f/u with her in 1 week from start, [facility] to contact patient in 3 weeks for refills.     Mentioned he had practiced with an at home demo device from provider. Observed patient administer first loading dose with 4 pens (160 mg.) Practiced great technique. Did state the the trigger on the pens was a bit rough, but tolerable. He then sat for a while, while we discussed the medication in detail. He did not have any side effects or reactions, but did mention that he felt like those spots around the stomach may bruise. Advised to use both thighs during next 80 mg dose due.     Discussed getting signed up with Humira nurse ambassador for dose reminders, but he mentioned he is very good about remembering those things, but will sign up should he have issues in the future.     Is aware I will follow up with him in a week. Stressed the importance of our phone calls for refill. He acknowledged.

## 2017-05-09 NOTE — TELEPHONE ENCOUNTER
Humira Follow Up completed. Name and  confirmed.   Follow up included: indication; goals of treatment; administration; storage and handling; side effects; how to handle side effects; the importance of compliance; how to handle missed doses; the importance of laboratory monitoring; the importance of keeping all follow up appointments.  Patient understands to report any medication changes to [facility] and provider. All questions answered and addressed to patients satisfaction.    Would like to just stick with pens at this time. Disscussed during consult that he could do research on the syringe due to having some pain with the pen injection being such a jolt. Next dose due next week. No flare ups since starting. Feels about the same as of now. Is aware we will be calling him back in about 2 weeks to get him set up with his first refill.

## 2017-05-15 ENCOUNTER — TELEPHONE (OUTPATIENT)
Dept: PHARMACY | Facility: CLINIC | Age: 32
End: 2017-05-15

## 2017-06-01 ENCOUNTER — TELEPHONE (OUTPATIENT)
Dept: GASTROENTEROLOGY | Facility: CLINIC | Age: 32
End: 2017-06-01

## 2017-06-02 NOTE — TELEPHONE ENCOUNTER
Called and spoke with pt  I explained since he is having pains and diarrhea Dr Spence wants to see him sooner.  I asked if he could come Monday 06/05 @ 1:00 with  and he said yes

## 2017-06-02 NOTE — TELEPHONE ENCOUNTER
----- Message from Breanna Sherif sent at 6/2/2017  9:55 AM CDT -----  Contact: self - [phone]  Jordy - needs note for work - back cramps and diarrhea - needs note written for work for today - please call patient at - [phone]

## 2017-06-13 ENCOUNTER — TELEPHONE (OUTPATIENT)
Dept: GASTROENTEROLOGY | Facility: CLINIC | Age: 32
End: 2017-06-13

## 2017-06-13 NOTE — TELEPHONE ENCOUNTER
Called pt, no answer, line just rang, unable to LM  Need to let pt know it is fine that he took his Humira a day late

## 2017-06-13 NOTE — TELEPHONE ENCOUNTER
----- Message from Breanna Gorman sent at 6/13/2017  3:33 PM CDT -----  Contact: self - [phone]  Jordy  - forgot to take humira yesterday but he took it today - is that ok - please call patient at  [phone]

## 2017-06-14 ENCOUNTER — TELEPHONE (OUTPATIENT)
Dept: PHARMACY | Facility: CLINIC | Age: 32
End: 2017-06-14

## 2017-06-26 ENCOUNTER — OFFICE VISIT (OUTPATIENT)
Dept: GASTROENTEROLOGY | Facility: CLINIC | Age: 32
End: 2017-06-26
Payer: COMMERCIAL

## 2017-06-26 VITALS
DIASTOLIC BLOOD PRESSURE: 82 MMHG | SYSTOLIC BLOOD PRESSURE: 133 MMHG | BODY MASS INDEX: 27.26 KG/M2 | HEART RATE: 78 BPM | HEIGHT: 73 IN | RESPIRATION RATE: 16 BRPM | TEMPERATURE: 98 F | WEIGHT: 205.69 LBS

## 2017-06-26 DIAGNOSIS — K50.813 CROHN'S DISEASE OF BOTH SMALL AND LARGE INTESTINE WITH FISTULA: ICD-10-CM

## 2017-06-26 DIAGNOSIS — E55.9 VITAMIN D DEFICIENCY: Primary | ICD-10-CM

## 2017-06-26 DIAGNOSIS — K60.3 ANAL FISTULA: ICD-10-CM

## 2017-06-26 PROCEDURE — 99215 OFFICE O/P EST HI 40 MIN: CPT | Mod: S$GLB,,, | Performed by: INTERNAL MEDICINE

## 2017-06-26 PROCEDURE — 99999 PR PBB SHADOW E&M-EST. PATIENT-LVL III: CPT | Mod: PBBFAC,,, | Performed by: INTERNAL MEDICINE

## 2017-06-26 RX ORDER — ERGOCALCIFEROL 1.25 MG/1
50000 CAPSULE ORAL
Qty: 4 CAPSULE | Refills: 3 | Status: SHIPPED | OUTPATIENT
Start: 2017-06-26 | End: 2017-07-18

## 2017-06-26 RX ORDER — FOLIC ACID 1 MG/1
1 TABLET ORAL DAILY
Qty: 30 TABLET | Refills: 11 | Status: SHIPPED | OUTPATIENT
Start: 2017-06-26 | End: 2017-11-13 | Stop reason: SDUPTHER

## 2017-06-26 RX ORDER — METHOTREXATE 2.5 MG/1
12.5 TABLET ORAL
Qty: 20 TABLET | Refills: 1 | Status: SHIPPED | OUTPATIENT
Start: 2017-06-26 | End: 2017-08-18 | Stop reason: SDUPTHER

## 2017-06-26 NOTE — PATIENT INSTRUCTIONS
Instructions:  - start high dose vit D for 4 months and then take vit D3 2000 IU daily  - take pneumovax 23 vaccine today  - start methotrexate 12.5 mg po weekly and folic acid 1 mg daily- start this tomorrow  - establish care with a primary care physician- given patient list  - make appt for patient to see Dr. Cason the week of July 24 th to remove seton  - continue Humira every Tuesday- next dose tomorrow  - get Humira levels done on 7/24, Monday- this needs to be done at [facility]  - drug monitoring labs: CBC/CMP every 2 weeks for 1 month (July 11, July 25)  - Follow up with NP, Kim Coronado August 22nd    - Methotrexate:  - we are starting this to prevent antibodies from forming to the Humira with highest risk being in first 6-12 months of taking it  - take methotrexate 12.5 mg orally once weekly at bedtime to minimize any side effects  - methotrexate can decrease your folic acid--take folic acid 1 mg daily    Common SE:   ?Gastrointestinal problems, such as nausea, stomach upset, and loose stools  ?Stomatitis or soreness of the mouth  ?Abnormal liver chemistries   ?Kidney problems  ?Rash- photosensivity- wear sunblock  ?Headache, fatigue, or impaired ability to concentrate  ?Hair loss- rare  ?Fever, which is drug-related  ?Hematologic abnormalities- abnormal blood counts

## 2017-06-26 NOTE — PROGRESS NOTES
"     [facility]             Inflammatory Bowel Disease Follow-up  Note            Dr. Bubba Spence  TODAY'S VISIT DATE:  6/26/2017    Chief Complaint:   Chief Complaint   Patient presents with    Crohn's Disease       PCP: Primary Doctor No    Previous History:  Joss Matute is a 31 y.o. male with Crohn's disease: ileum, rectal ulcer and perianal fistula/abscess; no active ileitis now (on last colonoscopy 1998 but normal ileum 2007, 1/2017, 4/2017); 4/2017 colonoscopy with rectal ulcer.  He began with symptoms of a perirectal abscess in December 1996 that was initially drained in the ED.  The abscess recurred in December 1997 and was associated with vomiting, weight loss, and abdominal pain.  His initial colonoscopy in 1998 (no actual record just reported in clinic note) showed moderately severe ileitis with biopsies of the right/transverse/left colon and rectum with mild nonspecific acute and chronic reactive changes.  The transverse colon polyp (likely removed) pathology showed it to be "edematous with mild glandular dysplasia."  At that time he was finally diagnosed with Crohn's disease but unsure of the exact location.  Initially he recalls being given prednisone for 4-5 months and pentasa for maintenance.  By 2002, age 17, he stopped all medications and did not have any GI follow up.  He had a hospital admission for a "flare" with dehydration in 2004 and again was not started on any medications.  He was seen in 2007 for RLQ abdominal cramping, increasing fatigue, and increased bowel frequency with blood and mucous.  He had a repeat colonoscopy in 2/2007 that showed patchy area of mucosa in the distal 4 cm of the TI with normal biopsies.  He had recurrent abscess/fistula in 2008 that spontaneously drained and was seen by CRS and did not wish to take any medications.  He reports intermittent abscess/perianal fistula from 7512-7926 with chronic drainage that would cause he to intermittently " "seek treatment of I&D in the ER.  In 10/2016 he established care with Dr. Bryson when he had a recurrent perianal fistula who planned to start patient on humira after surgical intervention.  He was seen by Dr. Cason on 3/15/2017 and had an EUA with seton placement.  He most recent colonoscopy on 2017 performed by Dr. LEANDRO Spence showed showed perianal skin tag, seton intact and small rectal ulcer with biopsies consistent with patchy active colitis with features of chronic mucosal injury.  He currnetly continues with 3 soft BMs/day with no blood, urgency, or nocturnal bowel movements.  The seton remains in place and he reports minimal purulent drainage with wiping and mild discomfort with bowel movements.      Interval History:  - current IBD meds: started Humira on 17 and tolerating well   - 3 soft to formed Bowel Movements/day  - mild discomfort from seton in place  - constitutional/GI symptoms: no fevers/chills, weight loss, dysphagia, GERD, abdominal pain  - extraintestinal manifestations: no eye pain/redness, skin lesions/rashes, liver problems, joint pain/swelling/stiffness    Previous Surgeries:  previous fistula repair Xs 2  3/15/2017: EUA with seton placement    Pertinent Endoscopy/Imagin Colonoscopy: (per clinic note path only) showed moderately severe ileitis; right, transverse, left, and rectum showed mild non-specific acute and chronic reactive changes; " transverse polyp path showed it to be edematous with mild glandular dysplasia"  2007 Colonoscopy: patchy area of mucosa in the ileum was nodular and granular (most distal 4 cm of TI) (path: normal); moderate amount of semisolid stool in the entire colon  2012 SBFT: normal  2017 Colonoscopy: fistula in the mauricio-anal area with no active drainage; normal mucosa in the whole colon and TI (no biopsies)  2017 MRE: no definitive evidence for abnormal mucosal enhancement within the small bowel; suspicious early fistulous " tract/fissure within the distal rectum/perianal region tracking along the 9 oclock position rotated into the 6 oclock position and exiting along the anal canal/fissure; no evidence for abnormal enhancing structures within the abdomen/pelvis  4/7/2017 Colonoscopy: perianal skin tag; small rectal ulcer in the rectum (seen on retroflexion) (path: ulcer and adjacent patchy active colitis and features of chronic mucosal injury), remainder colon normal (path: normal); normal TI (path: normal); seton intact     Pertinent Labs:  [employer] IBD testing--cannot read the original  2/6/17 T spot test negative  2/6/17 HBsAg negative  4/2017 CRP 0.8  4/2017 Vit D 15  4/2017 HBsAg neg, HBcAb neg  4/2017 VZV IgG positive  4/2017 TPMT normal  4/2017 vit B12 395    Prior IBD Therapies:  Prednisone  Pentasa    Current IBD Therapies:  Humira 40 mg SC every other week- started 5/1/17    Vaccinations:  Flu shot:    Chickenpox status/Varicella vaccine:  Lab Results   Component Value Date    VARICELLAZOS 2.98 (H) 04/24/2017    VARICELLAINT Positive (A) 04/24/2017     Tetanus: 4/2017  Pneumonia- PPV 13: 4/2017  Pneumonia- PPV 23: today  Hepatitis B:   Lab Results   Component Value Date    HEPBSAB Negative 04/24/2017     Hepatitis A: defer to PCP  HPV: NA   Meningococcal: NA     Review of Systems   Constitutional: Negative for chills, fever and weight loss.   HENT:        No oral ulcers, dysphagia, oral thrush   Eyes: Negative for blurred vision, pain and redness.   Respiratory: Negative for cough and shortness of breath.    Cardiovascular: Negative for chest pain.   Gastrointestinal: Negative for abdominal pain, heartburn, nausea and vomiting.   Genitourinary: Negative for dysuria and hematuria.   Musculoskeletal: Negative for back pain and joint pain.   Skin: Negative for rash.   Psychiatric/Behavioral: Negative for depression. The patient is not nervous/anxious and does not have insomnia.      All Medical History/Surgical History/Family  "History/Social History/Allergies have been reviewed and updated in EMR    Review of patient's allergies indicates:  No Known Allergies    Outpatient Prescriptions Marked as Taking for the 6/26/17 encounter (Office Visit) with Bubba Spence MD   Medication Sig Dispense Refill    acetaminophen (TYLENOL) 325 MG tablet Take 325 mg by mouth every 6 (six) hours as needed for Pain.      adalimumab (HUMIRA PEN) PnKt injection Inject 0.8 mLs (40 mg total) into the skin every 14 (fourteen) days. 2 each 5       Vital Signs:            /82 (BP Location: Left arm, Patient Position: Sitting) Comment (BP Location): manual  Pulse 78 Comment: O2: 97%  Temp 98.2 °F (36.8 °C)   Resp 16   Ht 6' 1" (1.854 m)   Wt 93.3 kg (205 lb 11 oz)   BMI 27.14 kg/m²      Physical Exam   Constitutional: He is oriented to person, place, and time. He appears well-developed.   HENT:   Mouth/Throat: Oropharynx is clear and moist. No oral lesions.   Eyes: Conjunctivae are normal. Pupils are equal, round, and reactive to light.   Cardiovascular: Normal rate and regular rhythm.    Pulmonary/Chest: Effort normal and breath sounds normal.   Abdominal: Soft. There is no tenderness.   Neurological: He is alert and oriented to person, place, and time.   Skin: No rash noted.   Psychiatric: He has a normal mood and affect.   Nursing note and vitals reviewed.      Labs: Reviewed and pertinent noted above    Assessment/Plan:  Joss Matute is a 31 y.o. male with Crohn's disease (ileum, rectal ulcer, perianal fistula/abscess) who last had perianal abscess drained with setons in 3/2017.  Though there was no active inflammation in the ileum since 1998 colonoscopy. We started patient on Humira in hopes of closing the fistula and preventing recurrent perianal Crohn's disease.  He started Humira on 5/1/17 and finished induction dosing and now on 40 mg SC every other week.  We will plan on seton removal after Humira on board for 12 weeks which will be the " "last week of July.  We will also be sure to optimize humira levels and prevent immunogenicity by starting patient on low dose oral MTX and getting humira trough levels on 7/24 after 12 weeks of Humira    # Crohn's disease: ileum, rectal ulcer and perianal fistula/abscess; no active ileitis now (on last colonoscopy 1998 but normal ileum 2007, 1/2017, 4/2017); 4/2017 colonoscopy with rectal ulcer  - continue Humira 40 mg SC every other week- started 5/1/17  - discussed MOA, route, risks of oral MTX 12.5 mg po weekly with folic acid 1 mg daily- will start and take MTX same day as Humira  - make appt with Dr. Cason the week of July 24 th for seton removal  - last colonoscopy showed no active inflammation so repeating every 2 years is reasonable unless there is symptoms change  - basic labs: CBC, CMP, ESR, CRP, vitamin B12  - drug monitoring labs:  CBC/CMP every 2 weeks for 1 month, 1 month later and then every 3 months; [facility] adalimumab levels/abs to be drawn after 3 months of taking Humira-7/24/17, T spot negative at OSH (2/6/17)- repeat T spot in 2/2018 if no TB risk factors prior to that; HBsAg negative (2/2016),  HBsAg, HBcAb, HbsAb negative 4/2017    # Ex-smoker  - pt quit fall 2015  - Crohn's be exacerbated with smoking and prevent meds from working and to avoid second hand smoke if possible (pt is )    # High risk colon cancer: sporadic adenoma in transverse colon:    - on colonoscopy 1998- report not available but per clinic note "transverse polyp path showed it to be edematous with mild glandular dysplasia"; I wonder if the "edematous" is really supposed to be "adenomatous"  - I am concerned about whether this is a sporadic adenoma in young patient or DALM.  Unlikely to be polypoid lesion in setting of colitis given transverse colon was never previously involved  - colonoscopy 1/2017 and 4/2017- no polyps found  - needs routine colonoscopy for surveillance every 5 years assuming this may have been " a sporadic adenoma- last one 4/2017    # IBD specific health maintenance- long term immunosuppression:  - PCP list given to patient to establish care with one of our PCPs  Opthamologic exam recommended yearly    Dermatologic exam recommended yearly     Bone health:  Risk of osteopenia/osteoporosis:  Risks factors: none  Vitamin D deficiency   Lab Results   Component Value Date    [id number] 15 (L) 04/24/2017     - start patient on high dose vit D- prescription for 3 mos given to patient today     Vaccine counseling:  - No LIVE VACCINES--discussed in clinic today  - Hepatitis  A, B- needs series- recommend f/u with PCP  - Pneumonia vaccine 23- today    Follow up: with NP,Kim Coronado on 8/22.     Total visit time was 25 minutes, more than 50% of which was spent in face-to-face counseling with patient regarding evaluation and management goals and treatment options for Crohn's disease    Bubba Spence MD  Department of Gastroenterology  Medical Director, Inflammatory Bowel Disease

## 2017-06-26 NOTE — LETTER
June 26, 2017        Javier Cason MD  [id number] [facility] [id number]             [facility] - Gastroenterology  [address]  [place] [address]  Phone: [phone]  Fax: [phone]   Patient: Joss Matute   MR Number: [id number]   YOB: 1985   Date of Visit: 6/26/2017       Dear Dr. Cason:    Thank you for referring Joss Matute to me for evaluation. Below are the relevant portions of my assessment and plan of care.            If you have questions, please do not hesitate to call me. I look forward to following Joss Alvarez along with you.    Sincerely,      Bubba Spence MD           CC  No Recipients

## 2017-07-18 ENCOUNTER — TELEPHONE (OUTPATIENT)
Dept: PHARMACY | Facility: CLINIC | Age: 32
End: 2017-07-18

## 2017-07-18 NOTE — TELEPHONE ENCOUNTER
patient confirmed need of refill for humira and states that he has 0 doses on hand and needs his next dose for 7/25. patient states that he will  his refill on monday 7/24. patient reports no new medications or allergies and has no questions for a pharmacist at this time.    [facility]  Phone: [phone]

## 2017-07-25 ENCOUNTER — CLINICAL SUPPORT (OUTPATIENT)
Dept: INFECTIOUS DISEASES | Facility: CLINIC | Age: 32
End: 2017-07-25
Payer: COMMERCIAL

## 2017-07-25 ENCOUNTER — OFFICE VISIT (OUTPATIENT)
Dept: SURGERY | Facility: CLINIC | Age: 32
End: 2017-07-25
Payer: COMMERCIAL

## 2017-07-25 VITALS
HEART RATE: 77 BPM | BODY MASS INDEX: 27.2 KG/M2 | HEIGHT: 73 IN | DIASTOLIC BLOOD PRESSURE: 73 MMHG | SYSTOLIC BLOOD PRESSURE: 134 MMHG | WEIGHT: 205.25 LBS

## 2017-07-25 DIAGNOSIS — K50.113 CROHN'S COLITIS, WITH FISTULA: Primary | ICD-10-CM

## 2017-07-25 PROCEDURE — 99999 PR PBB SHADOW E&M-EST. PATIENT-LVL I: CPT | Mod: PBBFAC,,,

## 2017-07-25 PROCEDURE — 99999 PR PBB SHADOW E&M-EST. PATIENT-LVL III: CPT | Mod: PBBFAC,,, | Performed by: COLON & RECTAL SURGERY

## 2017-07-25 PROCEDURE — 90471 IMMUNIZATION ADMIN: CPT | Mod: S$GLB,,, | Performed by: INTERNAL MEDICINE

## 2017-07-25 PROCEDURE — 99213 OFFICE O/P EST LOW 20 MIN: CPT | Mod: S$GLB,,, | Performed by: COLON & RECTAL SURGERY

## 2017-07-25 PROCEDURE — 90732 PPSV23 VACC 2 YRS+ SUBQ/IM: CPT | Mod: S$GLB,,, | Performed by: INTERNAL MEDICINE

## 2017-07-25 NOTE — PROGRESS NOTES
CRS Post-operative visit    Visit Info:   DATE OF PROCEDURE: 03/15/2017     PREOPERATIVE DIAGNOSIS: Anal fistula.     POSTOPERATIVE DIAGNOSIS: Anal fistula, multiple perianal skin tags.     PROCEDURE: Exam under anesthesia, placement of a seton.       INDICATION: Mr. Matute is a 31-year-old male who has a chronic draining fistula  with skin tags, some question of Crohn disease. Had an equivicol [employer] test. Is on no meds    Current Status: Doing well postoperatively.    Physical Exam:  General: White male in NAD sitting in chair in clinic  Neuro: aaox4 maex4 perrl  Respiratory: resps even unlabored  Cardiac: cap refill <2 sec  Abdomen: Normal, benign. Incisions:seton in place seton removed  Anorectal: no erythema , multiple fleshy skin tags  Assessment and Plan:  Joss Alvarez was seen today for follow-up.    Diagnoses and all orders for this visit:    Crohn's colitis, with fistula    seton removed continue [employer].  Return to clinic in 4 weeks

## 2017-07-26 ENCOUNTER — TELEPHONE (OUTPATIENT)
Dept: GASTROENTEROLOGY | Facility: CLINIC | Age: 32
End: 2017-07-26

## 2017-07-26 NOTE — TELEPHONE ENCOUNTER
KG spoke to patient, patient was just clarifying that he needed to continue these medications, he will  refills from pharmacy.

## 2017-08-11 ENCOUNTER — TELEPHONE (OUTPATIENT)
Dept: PHARMACY | Facility: CLINIC | Age: 32
End: 2017-08-11

## 2017-08-18 DIAGNOSIS — K50.813 CROHN'S DISEASE OF BOTH SMALL AND LARGE INTESTINE WITH FISTULA: ICD-10-CM

## 2017-08-18 DIAGNOSIS — K60.3 ANAL FISTULA: ICD-10-CM

## 2017-08-21 ENCOUNTER — TELEPHONE (OUTPATIENT)
Dept: GASTROENTEROLOGY | Facility: CLINIC | Age: 32
End: 2017-08-21

## 2017-08-21 RX ORDER — METHOTREXATE 2.5 MG/1
TABLET ORAL
Qty: 20 TABLET | Refills: 1 | Status: SHIPPED | OUTPATIENT
Start: 2017-08-21 | End: 2017-11-13 | Stop reason: SDUPTHER

## 2017-08-21 NOTE — TELEPHONE ENCOUNTER
Called pt on number in message. That is his wife's phone and she is not aware why pt was calling.  She gave me [phone] and said that was pts cell number  Called number provided by wife and no answer, lm with direct line explaining I was calling in reference to his message

## 2017-08-21 NOTE — TELEPHONE ENCOUNTER
----- Message from Breanna Gorman sent at 8/21/2017  2:41 PM CDT -----  Contact: self - [phone]  [facility] pharmacy is waiting on prior auth on methotrexate - [facility] [phone] -  please call patient at

## 2017-08-25 NOTE — TELEPHONE ENCOUNTER
Called pts cell again [phone] and left message explaining I was calling back in reference to his meds. I explained we have not gotten anything from his pharmacy so I want to make sure he was able to get it.  Left direct line to return call

## 2017-09-12 ENCOUNTER — TELEPHONE (OUTPATIENT)
Dept: PHARMACY | Facility: CLINIC | Age: 32
End: 2017-09-12

## 2017-09-12 NOTE — TELEPHONE ENCOUNTER
patient confirmed need of refill for humira,0 doses remaining , patient injects every 14 days, no missed doses, no new medications or allergies and has no questions for a pharmacist at this time. patient states that he will come by pharmacy on 9/18 and  his refill. $0 copay Katie Hondroulis [facility]- Refill Technician  Phone: [phone]

## 2017-10-10 ENCOUNTER — TELEPHONE (OUTPATIENT)
Dept: PHARMACY | Facility: CLINIC | Age: 32
End: 2017-10-10

## 2017-11-06 ENCOUNTER — TELEPHONE (OUTPATIENT)
Dept: GASTROENTEROLOGY | Facility: CLINIC | Age: 32
End: 2017-11-06

## 2017-11-06 ENCOUNTER — PATIENT MESSAGE (OUTPATIENT)
Dept: GASTROENTEROLOGY | Facility: CLINIC | Age: 32
End: 2017-11-06

## 2017-11-06 DIAGNOSIS — K50.813 CROHN'S DISEASE OF BOTH SMALL AND LARGE INTESTINE WITH FISTULA: ICD-10-CM

## 2017-11-06 RX ORDER — ADALIMUMAB 40MG/0.8ML
KIT SUBCUTANEOUS
Qty: 2 EACH | Refills: 1 | Status: SHIPPED | OUTPATIENT
Start: 2017-11-06 | End: 2018-01-03 | Stop reason: SDUPTHER

## 2017-11-06 NOTE — TELEPHONE ENCOUNTER
----- Message from Bubba Spence MD sent at 11/6/2017  2:10 PM CST -----  I am really concerned that after he cancelled appt with  on 9/7 that this patient has not had another appt scheduled. Please be sure to call and copy me and  on the message to schedule him asap with  anytime    If you cannot get in touch with him I need to know so I can send a letter to his home. Also try his emergency contact if you can't reach him    SS

## 2017-11-06 NOTE — TELEPHONE ENCOUNTER
Patient is scheduled for a F/U with CHARLY Moreno on 11/13/2017. Patient will check [facility] for an appointment reminder.

## 2017-11-06 NOTE — TELEPHONE ENCOUNTER
----- Message from Lyssa Henderson sent at 11/6/2017  4:18 PM CST -----  Contact: Self- [phone]  Jordy- pt called to schedule his f/u appt- please call pt back at [phone]

## 2017-11-08 ENCOUNTER — TELEPHONE (OUTPATIENT)
Dept: PHARMACY | Facility: CLINIC | Age: 32
End: 2017-11-08

## 2017-11-12 NOTE — PROGRESS NOTES
"    [facility]             Inflammatory Bowel Disease Follow-up  Note            Bubba Spence MD & GABE Moreno  TODAY'S VISIT DATE:  11/12/2017    Chief Complaint:   Chief Complaint   Patient presents with    Crohn's Disease     PCP: Primary Doctor No    Previous History:  Joss Matute is a 31 y.o. male with Crohn's disease: ileum, rectal ulcer and perianal fistula/abscess; no active ileitis now (on last colonoscopy 1998 but normal ileum 2007, 1/2017, 4/2017); 4/2017 colonoscopy with rectal ulcer.  He began with symptoms of a perirectal abscess in December 1996 that was initially drained in the ED.  The abscess recurred in December 1997 and was associated with vomiting, weight loss, and abdominal pain.  His initial colonoscopy in 1998 (no actual record just reported in clinic note) showed moderately severe ileitis with biopsies of the right/transverse/left colon and rectum with mild nonspecific acute and chronic reactive changes.  The transverse colon polyp (likely removed) pathology showed it to be "edematous with mild glandular dysplasia."  At that time he was finally diagnosed with Crohn's disease but unsure of the exact location.  Initially he recalls being given prednisone for 4-5 months and pentasa for maintenance.  By 2002, age 17, he stopped all medications and did not have any GI follow up.  He had a hospital admission for a "flare" with dehydration in 2004 and again was not started on any medications.  He was seen in 2007 for RLQ abdominal cramping, increasing fatigue, and increased bowel frequency with blood and mucous.  He had a repeat colonoscopy in 2/2007 that showed patchy area of mucosa in the distal 4 cm of the TI with normal biopsies.  He had recurrent abscess/fistula in 2008 that spontaneously drained and was seen by CRS and did not wish to take any medications.  He reports intermittent abscess/perianal fistula from [id number] with chronic drainage that would cause he " "to intermittently seek treatment of I&D in the ER.  In 10/2016 he established care with Dr. Bryson when he had a recurrent perianal fistula who planned to start patient on humira after surgical intervention.  He was seen by Dr. Cason on 3/15/2017 and had an EUA with seton placement.  He most recent colonoscopy on 2017 performed by Dr. LEANDRO Spence showed showed perianal skin tag, seton intact and small rectal ulcer with biopsies consistent with patchy active colitis with features of chronic mucosal injury.  He started Humira on 2017 and setons remained in place.        Interval History:  - current IBD meds: started Humira on 17, last dose 10/31/2017, next dose 2017  - 3 soft to formed Bowel Movements/day  - started oral MTX 12.5 mg in 2017, ran out last month and did not get a refill--did not call us  - cancelled several appointments  - seton removed by Dr. Cason on 2017  - still having clear drainage from fistula, no pain, redness, or swelling  - constitutional/GI symptoms: no fevers/chills, weight loss, dysphagia, GERD, abdominal pain  - extraintestinal manifestations: no eye pain/redness, skin lesions/rashes, liver problems, joint pain/swelling/stiffness, dysuria/hematuria    Pertinent Endoscopy/Imagin Colonoscopy: (per clinic note path only) showed moderately severe ileitis; right, transverse, left, and rectum showed mild non-specific acute and chronic reactive changes; " transverse polyp path showed it to be edematous with mild glandular dysplasia"  2007 Colonoscopy: patchy area of mucosa in the ileum was nodular and granular (most distal 4 cm of TI) (path: normal); moderate amount of semisolid stool in the entire colon  2012 SBFT: normal  2017 Colonoscopy: fistula in the mauricio-anal area with no active drainage; normal mucosa in the whole colon and TI (no biopsies)  2017 MRE: no definitive evidence for abnormal mucosal enhancement within the small bowel; " suspicious early fistulous tract/fissure within the distal rectum/perianal region tracking along the 9 oclock position rotated into the 6 oclock position and exiting along the anal canal/fissure; no evidence for abnormal enhancing structures within the abdomen/pelvis  4/7/2017 Colonoscopy: perianal skin tag; small rectal ulcer in the rectum (seen on retroflexion) (path: ulcer and adjacent patchy active colitis and features of chronic mucosal injury), remainder colon normal (path: normal); normal TI (path: normal); seton intact     Previous Surgeries:  previous fistula repair Xs 2  3/15/2017: EUA with seton placement    Pertinent Labs:  Secured Mail IBD testing--cannot read the original  2/6/17 T spot test negative  2/6/17 HBsAg negative  4/2017 CRP 0.8  4/2017 Vit D 15  4/2017 HBsAg neg, HBcAb neg  4/2017 VZV IgG positive  4/2017 TPMT normal  4/2017 vit B12 395  No results found for: STOOLCULTURE, VABJNGHOST3K, HRKMWJVQBO1U, CDIFFICILEAN, CDIFFTOX, CDIFFICILEBY  Lab Results   Component Value Date    CRP 0.8 04/24/2017     Lab Results   Component Value Date    [id number] 15 (L) 04/24/2017     Lab Results   Component Value Date    HEPBCAB Negative 04/24/2017     Lab Results   Component Value Date    VARICELLAZOS 2.98 (H) 04/24/2017    VARICELLAINT Positive (A) 04/24/2017     No results found for: NIL, TBAG, TBAGNIL, MITOGENNIL, TBGOLD  No results found for: TPMTRESULT  No results found for: ANSADAINIT, INFLIXIMAB, INFLIXINTERP    Prior IBD Meds:  Prednisone  Pentasa    Current IBD Meds:  Humira 40 mg SC every other week- started 5/1/17  Oral MTX 12.5 mg PO weekly with folic acid 1 mg PO daily--started 8/2017, ran out one month ago    Vaccinations:  Flu shot: recommended  Chickenpox status/Varicella vaccine:  Lab Results   Component Value Date    VARICELLAZOS 2.98 (H) 04/24/2017    VARICELLAINT Positive (A) 04/24/2017   Tetanus: 4/2017  Pneumonia 13 (PCV13) vaccine: 4/2017  Pneumonia 23 (PPSV23) vaccine:  "7/25/2017  Hepatitis B: recommended  Lab Results   Component Value Date    HEPBSAB Negative 04/24/2017   Hepatitis A: recommended  HPV: NA   Meningococcal: NA     NSAID use/indication:  No    Narcotic use:  No    Alternative/Complementary Meds for IBD:  No    Review of Systems   Constitutional: Negative for chills, fever and weight loss.   HENT:        No oral ulcers, dysphagia, oral thrush   Eyes: Negative for blurred vision, pain and redness.   Respiratory: Negative for cough and shortness of breath.    Cardiovascular: Negative for chest pain.   Gastrointestinal: Negative for abdominal pain, heartburn, nausea and vomiting.   Genitourinary: Negative for dysuria and hematuria.   Musculoskeletal: Negative for back pain and joint pain.   Skin: Negative for rash.   Psychiatric/Behavioral: Negative for depression. The patient is not nervous/anxious and does not have insomnia.        All Medical History/Surgical History/Family History/Social History/Allergies have been reviewed and updated in EMR    Review of patient's allergies indicates:  No Known Allergies  Outpatient Prescriptions Marked as Taking for the 11/13/17 encounter (Office Visit) with CHARLY Sevilla   Medication Sig Dispense Refill    ergocalciferol (ERGOCALCIFEROL) 50,000 unit Cap Take 50,000 Units by mouth every 7 days.      HUMIRA PEN PnKt injection INJECT 0.8ML (40MG TOTAL) INTO THE SKIN EVERY 14 DAYS 2 each 1     Vital Signs:  Vitals:    11/13/17 1513   BP: 134/80   Pulse: 78   Resp: 14   Temp: 98.2 °F (36.8 °C)   Weight: 92.7 kg (204 lb 5.9 oz)   Height: 6' 1" (1.854 m)   PainSc: 0-No pain     Physical Exam   Constitutional: He is oriented to person, place, and time. He appears well-developed.   HENT:   Mouth/Throat: No oral lesions.   Eyes: Conjunctivae and EOM are normal. Pupils are equal, round, and reactive to light.   Cardiovascular: Normal rate and regular rhythm.    Pulmonary/Chest: Effort normal and breath sounds normal.   Abdominal: Soft. " There is no tenderness.   Genitourinary:   Genitourinary Comments: Small fistula opening on left buttock near anal opening with clear drainage; no erythema, tenderness, fluctuance, or purulent drainage   Musculoskeletal:        Right lower leg: He exhibits no swelling.        Left lower leg: He exhibits no swelling.   Neurological: He is alert and oriented to person, place, and time.   Skin: No rash noted.   Psychiatric: He has a normal mood and affect.   Nursing note and vitals reviewed.    Labs: Reviewed and pertinent noted above    Assessment/Plan:  Joss Matute is a 31 y.o. male with with Crohn's disease (ileum, rectal ulcer, perianal fistula/abscess) who last had perianal abscess drained with setons in 3/2017.  Though there was no active inflammation in the ileum since 1998 colonoscopy.  He started humira in 5/2017 and oral MTX in 8/2017 for immunogenicity prevention.  His seton was removed by Dr. Cason in 7/2017 with no abscess recurrence.  Unfortunately he has cancelled several appointments and has been off of oral MTX for 4 weeks due to his RX expiring and was lost to follow up.  He reports doing well with 3 soft to formed BMs with only minimal clear drainage from his fistula.  We will restart oral MTX 12.5 mg PO weekly with daily folic acid 1 mg.  He will continue humira 40 mg SC every 14 days with his next dose due 11/14/2017 so we will draw trough [facility] Levels and ABs today along with CBC and CMP to see if we need to further optimize humira.  If these levels are low, we may need to repeat them in 8 weeks once oral MTX is on board to see if level is higher prior to considering weekly humira.  We will plan for close clinic follow up every 3 months due to non-compliance.      # Crohn's disease: ileum, rectal ulcer and perianal fistula/abscess; no active ileitis now (on last colonoscopy 1998 but normal ileum 2007, 1/2017, 4/2017); 4/2017 colonoscopy with rectal ulcer  - continue Humira 40 mg SC  "every other week-started 5/1/17, next dose due 11/14/2014  - restart oral MTX 12.5 mg po weekly with folic acid 1 mg daily  - last colonoscopy showed no active inflammation so plan to repeat late summer/fall 2018  - drug monitoring labs:  CBC/CMP today then every 3 months; T spot negative at OSH (2/6/17)- repeat T spot in 2/2018 if no TB risk factors prior to that; HBsAg negative (2/2016),  HBsAg, HBcAb, HbsAb negative 4/2017  - [facility] adalimumab levels/abs today     # Ex-smoker  - pt quit fall 2015  - Crohn's be exacerbated with smoking and prevent meds from working and to avoid second hand smoke if possible (pt is )    # High risk colon cancer: sporadic adenoma in transverse colon:    - on colonoscopy 1998-report not available but per clinic note "transverse polyp path showed it to be edematous with mild glandular dysplasia"; I wonder if the "edematous" is really supposed to be "adenomatous"  - I am concerned about whether this is a sporadic adenoma in young patient or DALM. Unlikely to be polypoid lesion in setting of colitis given transverse colon was never previously involved--1/2017 and 4/2017-no polyps found  - colonoscopy:  - needs routine colonoscopy for surveillance every 5 years assuming this may have been a sporadic adenoma- last one 4/2017     # IBD specific health maintenance- long term immunosuppression:  - PCP list given to patient to establish care with one of our PCPs--list given in clinic today  Opthamologic exam recommended yearly--due now  Dermatologic exam recommended yearly--due now    Bone health:  Calcium 1200 mg daily and vitamin D3 1000 IU daily  Risk of osteopenia/osteoporosis:  Risks factors: none  Vitamin D: continue high dose vit D, OTC Vitamin D3 2000 IU once RX complete  Lab Results   Component Value Date    [id number] 15 (L) 04/24/2017     Vaccine counseling:  - No LIVE VACCINES--discussed in clinic today  - Hepatitis  A, B- needs series-recommend f/u with PCP    Follow " up: with JAC Alvarez in 3 months    Total visit time was 25 minutes, more than 50% of which was spent in face-to-face counseling with patient regarding evaluation, management goals, and treatment options for Crohn's disease    CHARLY Sevilla-C  Department of Gastroenterology  Inflammatory Bowel Disease Program    Discussed patient and reviewed plan of care with NP    Bubba Specne MD   Department of Gastroenterology  Medical Director, Inflammatory Bowel Disease

## 2017-11-13 ENCOUNTER — OFFICE VISIT (OUTPATIENT)
Dept: GASTROENTEROLOGY | Facility: CLINIC | Age: 32
End: 2017-11-13
Payer: COMMERCIAL

## 2017-11-13 VITALS
TEMPERATURE: 98 F | HEIGHT: 73 IN | HEART RATE: 78 BPM | DIASTOLIC BLOOD PRESSURE: 80 MMHG | SYSTOLIC BLOOD PRESSURE: 134 MMHG | BODY MASS INDEX: 27.09 KG/M2 | WEIGHT: 204.38 LBS | RESPIRATION RATE: 14 BRPM

## 2017-11-13 DIAGNOSIS — K50.813 CROHN'S DISEASE OF BOTH SMALL AND LARGE INTESTINE WITH FISTULA: Primary | ICD-10-CM

## 2017-11-13 DIAGNOSIS — Z79.899 HIGH RISK MEDICATION USE: ICD-10-CM

## 2017-11-13 DIAGNOSIS — Z91.89 HIGH RISK FOR COLON CANCER: ICD-10-CM

## 2017-11-13 DIAGNOSIS — E55.9 VITAMIN D DEFICIENCY: ICD-10-CM

## 2017-11-13 DIAGNOSIS — K60.3 ANAL FISTULA: ICD-10-CM

## 2017-11-13 DIAGNOSIS — Z87.891 EX-SMOKER: ICD-10-CM

## 2017-11-13 PROCEDURE — 99214 OFFICE O/P EST MOD 30 MIN: CPT | Mod: S$GLB,,, | Performed by: NURSE PRACTITIONER

## 2017-11-13 PROCEDURE — 99999 PR PBB SHADOW E&M-EST. PATIENT-LVL IV: CPT | Mod: PBBFAC,,, | Performed by: NURSE PRACTITIONER

## 2017-11-13 RX ORDER — METHOTREXATE 2.5 MG/1
12.5 TABLET ORAL
Qty: 20 TABLET | Refills: 2 | Status: SHIPPED | OUTPATIENT
Start: 2017-11-13 | End: 2018-02-19 | Stop reason: SDUPTHER

## 2017-11-13 RX ORDER — FOLIC ACID 1 MG/1
1 TABLET ORAL DAILY
Qty: 30 TABLET | Refills: 11 | Status: SHIPPED | OUTPATIENT
Start: 2017-11-13 | End: 2019-02-13

## 2017-11-13 RX ORDER — ERGOCALCIFEROL 1.25 MG/1
50000 CAPSULE ORAL
COMMUNITY
End: 2018-05-14

## 2017-11-13 NOTE — PATIENT INSTRUCTIONS
Instructions:  - labs today--CBC, CMP, Humira levels and ABs  - continue Humira every 14 days, due tomorrow 11/14/2017  - restart oral MTX 12.5 mg every 7 days RX sent to [facility]  - restart folic acid 1 mg PO daily  - give patient PCP list so he can establish can with a PCP  - continue high dose vitamin D once a week then take oral OTC vitamin D3 2000 IU once daily  - Avoid all NSAIDs (Advil, Ibuprofen, Motrin, Aspirin, Naprosyn, Aleve)  - Yearly Eye exam--due now  - Yearly Skin exam--wear sun block and hats--due now  - Use antibiotics with caution  - Vaccines needed:  Flu shot yearly  Tetanus every 10 years  Hepatitis H & B series (three injections)  - Follow up with JAC Alvarez 3 months

## 2017-11-24 ENCOUNTER — TELEPHONE (OUTPATIENT)
Dept: GASTROENTEROLOGY | Facility: CLINIC | Age: 32
End: 2017-11-24

## 2017-12-01 ENCOUNTER — PATIENT MESSAGE (OUTPATIENT)
Dept: GASTROENTEROLOGY | Facility: CLINIC | Age: 32
End: 2017-12-01

## 2017-12-01 ENCOUNTER — TELEPHONE (OUTPATIENT)
Dept: GASTROENTEROLOGY | Facility: CLINIC | Age: 32
End: 2017-12-01

## 2017-12-01 NOTE — TELEPHONE ENCOUNTER
Called patient to discuss humira levels/abs but no answer and unable to leave a message.     Email also sent and will await response.

## 2017-12-06 ENCOUNTER — TELEPHONE (OUTPATIENT)
Dept: PHARMACY | Facility: CLINIC | Age: 32
End: 2017-12-06

## 2017-12-08 ENCOUNTER — TELEPHONE (OUTPATIENT)
Dept: PHARMACY | Facility: CLINIC | Age: 32
End: 2017-12-08

## 2017-12-12 ENCOUNTER — TELEPHONE (OUTPATIENT)
Dept: PHARMACY | Facility: CLINIC | Age: 32
End: 2017-12-12

## 2018-01-03 ENCOUNTER — TELEPHONE (OUTPATIENT)
Dept: GASTROENTEROLOGY | Facility: CLINIC | Age: 33
End: 2018-01-03

## 2018-01-03 DIAGNOSIS — K50.813 CROHN'S DISEASE OF BOTH SMALL AND LARGE INTESTINE WITH FISTULA: ICD-10-CM

## 2018-01-03 DIAGNOSIS — K50.813 CROHN'S DISEASE OF BOTH SMALL AND LARGE INTESTINE WITH FISTULA: Primary | ICD-10-CM

## 2018-01-03 RX ORDER — ADALIMUMAB 40MG/0.8ML
KIT SUBCUTANEOUS
Qty: 2 EACH | Refills: 1 | Status: SHIPPED | OUTPATIENT
Start: 2018-01-03 | End: 2018-01-23 | Stop reason: SDUPTHER

## 2018-01-03 NOTE — TELEPHONE ENCOUNTER
----- Message from CHARLY Sevilla sent at 1/3/2018  8:36 AM CST -----  Norma/Sirisha,    Please call patient and set up repeat [facility] Humira levels and ABs to be drawn here the day before his next humira injection.  Order is placed.    Dr. Spence--HECTOR    Thanks,

## 2018-01-03 NOTE — TELEPHONE ENCOUNTER
Called and spoke with pt  I got his labs scheduled for here at [facility] for  01/08 @ 5:15

## 2018-01-05 ENCOUNTER — TELEPHONE (OUTPATIENT)
Dept: PHARMACY | Facility: CLINIC | Age: 33
End: 2018-01-05

## 2018-01-08 ENCOUNTER — LAB VISIT (OUTPATIENT)
Dept: LAB | Facility: HOSPITAL | Age: 33
End: 2018-01-08
Payer: COMMERCIAL

## 2018-01-08 DIAGNOSIS — K50.813 CROHN'S DISEASE OF BOTH SMALL AND LARGE INTESTINE WITH FISTULA: ICD-10-CM

## 2018-01-08 PROCEDURE — 36415 COLL VENOUS BLD VENIPUNCTURE: CPT

## 2018-01-08 PROCEDURE — 80299 QUANTITATIVE ASSAY DRUG: CPT

## 2018-01-16 ENCOUNTER — TELEPHONE (OUTPATIENT)
Dept: GASTROENTEROLOGY | Facility: CLINIC | Age: 33
End: 2018-01-16

## 2018-01-16 LAB — ADALIMUMAB CONCENTRATION & ANTI-ADALIMUMAB ANTIBODY: NORMAL

## 2018-01-16 NOTE — TELEPHONE ENCOUNTER
----- Message from CHARLY Sevilla sent at 1/16/2018  8:45 AM CST -----  Please call patient and let him know that we need to move up his appointment to a sooner appointment with Dr. Spence in clinic to discuss his repeat humira levels and antibodies    Thanks,

## 2018-01-16 NOTE — TELEPHONE ENCOUNTER
Called and spoke with pt  I explained we got his results back and Dr Spence would like to see him sooner than 02/02.  I got him rescheduled for 01/18/2018 @ 1:00

## 2018-01-18 ENCOUNTER — TELEPHONE (OUTPATIENT)
Dept: GASTROENTEROLOGY | Facility: CLINIC | Age: 33
End: 2018-01-18

## 2018-01-18 NOTE — TELEPHONE ENCOUNTER
Pt is rescheduled for clinic appointment, which was scheduled for today. New clinic appointment is on 1/23/2108. Pt verbalized understanding.

## 2018-01-23 ENCOUNTER — OFFICE VISIT (OUTPATIENT)
Dept: GASTROENTEROLOGY | Facility: CLINIC | Age: 33
End: 2018-01-23
Payer: COMMERCIAL

## 2018-01-23 VITALS
RESPIRATION RATE: 14 BRPM | DIASTOLIC BLOOD PRESSURE: 77 MMHG | HEART RATE: 75 BPM | HEIGHT: 73 IN | SYSTOLIC BLOOD PRESSURE: 114 MMHG | TEMPERATURE: 99 F | WEIGHT: 211.19 LBS | BODY MASS INDEX: 27.99 KG/M2

## 2018-01-23 DIAGNOSIS — Z87.891 EX-SMOKER: ICD-10-CM

## 2018-01-23 DIAGNOSIS — E55.9 VITAMIN D DEFICIENCY: ICD-10-CM

## 2018-01-23 DIAGNOSIS — Z79.899 HIGH RISK MEDICATION USE: ICD-10-CM

## 2018-01-23 DIAGNOSIS — K60.3 ANAL FISTULA: ICD-10-CM

## 2018-01-23 DIAGNOSIS — K50.813 CROHN'S DISEASE OF BOTH SMALL AND LARGE INTESTINE WITH FISTULA: Primary | ICD-10-CM

## 2018-01-23 PROCEDURE — 99215 OFFICE O/P EST HI 40 MIN: CPT | Mod: S$GLB,,, | Performed by: NURSE PRACTITIONER

## 2018-01-23 PROCEDURE — 99999 PR PBB SHADOW E&M-EST. PATIENT-LVL IV: CPT | Mod: PBBFAC,,, | Performed by: NURSE PRACTITIONER

## 2018-01-23 NOTE — PATIENT INSTRUCTIONS
Instructions:  - labs (CBC, CMP, T-Spot) this week  - continue humira 40 mg SC every 14 days, due today--we are increasing to every 7 days, I am sending in a new RX to [facility] pharmacy, once it is approved and you get a new shipment of 4 pens you will start weekly injections--please email and let us know  - continue oral MTX 12.5 mg weekly at bedtime  - continue folic acid 1 mg PO daily  - continue vitamin D once weekly, once complete take OTC vitamin D3 2000 IU po daily  - plans for repeat colonoscopy in fall 2018  - Avoid all NSAIDs (Advil, Ibuprofen, Motrin, Aspirin, Naprosyn, Aleve)  - set up Primary care doctor for a well visit  - Yearly Eye exam--due now  - Yearly Skin exam--wear sun block and hats--due now  - Use antibiotics with caution  - ID referral--MA to schedule at earliest convience  - Follow up with JAC Alvarez in 3 months    - All Immunizations should be up to date prior to starting therapy--NO LIVE vaccines during therapy or 8 weeks prior to therapy  - Live Vaccines Include:   --Intranasal Influenza A/B  --Measles, Mumps, Rubella (MMR)  --Rotavirus  --Typhoid (oral)  --Vaccina (Smallpox)  --Varicella (Chicken Pox)  --Yellow Fever  --Zoster

## 2018-01-23 NOTE — PROGRESS NOTES
"     [facility]             Inflammatory Bowel Disease Follow-up  Note              TODAY'S VISIT DATE:  1/23/2018    Chief Complaint:   Chief Complaint   Patient presents with    Crohn's disease of both small and large intestine w/ fistula     PCP: Primary Doctor No    Previous History:  Joss Matute is a 32 y.o. male with Crohn's disease: ileum, rectal ulcer and perianal fistula/abscess; no active ileitis now (on last colonoscopy 1998 but normal ileum 2007, 1/2017, 4/2017); 4/2017 colonoscopy with rectal ulcer.  He began with symptoms of a perirectal abscess in December 1996 that was initially drained in the ED.  The abscess recurred in December 1997 and was associated with vomiting, weight loss, and abdominal pain.  His initial colonoscopy in 1998 (no actual record just reported in clinic note) showed moderately severe ileitis with biopsies of the right/transverse/left colon and rectum with mild nonspecific acute and chronic reactive changes.  The transverse colon polyp (likely removed) pathology showed it to be "edematous with mild glandular dysplasia."  At that time he was finally diagnosed with Crohn's disease but unsure of the exact location.  Initially he recalls being given prednisone for 4-5 months and pentasa for maintenance.  By 2002, age 17, he stopped all medications and did not have any GI follow up.  He had a hospital admission for a "flare" with dehydration in 2004 and again was not started on any medications.  He was seen in 2007 for RLQ abdominal cramping, increasing fatigue, and increased bowel frequency with blood and mucous.  He had a repeat colonoscopy in 2/2007 that showed patchy area of mucosa in the distal 4 cm of the TI with normal biopsies.  He had recurrent abscess/fistula in 2008 that spontaneously drained and was seen by CRS and did not wish to take any medications.  He reports intermittent abscess/perianal fistula from [id number] with chronic drainage that would " "cause he to intermittently seek treatment of I&D in the ER.  In 10/2016 he established care with Dr. Bryson when he had a recurrent perianal fistula who planned to start patient on humira after surgical intervention.  He was seen by Dr. Cason on 3/15/2017 and had an EUA with seton placement.  He most recent colonoscopy on 2017 performed by Dr. LEANDRO Spence showed showed perianal skin tag, seton intact and small rectal ulcer with biopsies consistent with patchy active colitis with features of chronic mucosal injury.  He started Humira on 2017.  Setons were removed by Dr. Cason in 2017.  He started oral MTX in 2017 for immunogenicity prevention.  We planned to repeat Humira levels and ABs to see if we needed to further optimize the drug levels.      Interval History:  - current IBD meds: humira 40 mg SC every 14 days, last dose 2018, next dose 2018; oral MTX 12.5 mg PO weekly, folic acid 1 mg PO daily (forgets to take daily)  - 3 soft to formed Bowel Movements/day with no blood and no nocturnal BMs  - 2018: Trough [facility] ADA Levels 2.5, ABs 57 (low)  - still having clear drainage from fistula, no pain, redness, or swelling  - constitutional/GI symptoms: no fevers/chills, weight loss, dysphagia, GERD, abdominal pain  - extraintestinal manifestations: no eye pain/redness, skin lesions/rashes, liver problems, joint pain/swelling/stiffness, dysuria/hematuria    Pertinent Endoscopy/Imagin Colonoscopy: (per clinic note path only) showed moderately severe ileitis; right, transverse, left, and rectum showed mild non-specific acute and chronic reactive changes; " transverse polyp path showed it to be edematous with mild glandular dysplasia"  2007 Colonoscopy: patchy area of mucosa in the ileum was nodular and granular (most distal 4 cm of TI) (path: normal); moderate amount of semisolid stool in the entire colon  2012 SBFT: normal  2017 Colonoscopy: fistula in the mauricio-anal area " with no active drainage; normal mucosa in the whole colon and TI (no biopsies)  1/18/2017 MRE: no definitive evidence for abnormal mucosal enhancement within the small bowel; suspicious early fistulous tract/fissure within the distal rectum/perianal region tracking along the 9 oclock position rotated into the 6 oclock position and exiting along the anal canal/fissure; no evidence for abnormal enhancing structures within the abdomen/pelvis  4/7/2017 Colonoscopy: perianal skin tag; small rectal ulcer in the rectum (seen on retroflexion) (path: ulcer and adjacent patchy active colitis and features of chronic mucosal injury), remainder colon normal (path: normal); normal TI (path: normal); seton intact     Previous Surgeries:  previous fistula repair Xs 2  3/15/2017: EUA with seton placement    Pertinent Labs:  [facility] IBD testing--cannot read the original  2/6/17 T spot test negative  2/6/17 HBsAg negative  4/2017 CRP 0.8  4/2017 Vit D 15  4/2017 HBsAg neg, HBcAb neg  4/2017 VZV IgG positive  4/2017 TPMT normal  4/2017 vit B12 395  1/8/2018: Trough [facility] ADA Levels 2.5, ABs 57 (low)  Lab Results   Component Value Date    CRP 0.8 04/24/2017     Lab Results   Component Value Date    TTGIGA 7 04/24/2017     04/24/2017     No results found for: TSH, FREET4  Lab Results   Component Value Date    EZLSWHIY90KB 15 (L) 04/24/2017    XGBECNQN78 395 04/24/2017     Lab Results   Component Value Date    HEPBSAG Negative 04/24/2017    HEPBCAB Negative 04/24/2017     No results found for: FNW01UJVU  No results found for: NIL, TBAG, TBAGNIL, MITOGENNIL, TBGOLD  No results found for: STOOLCULTURE, YFTETZJCPY6X, ZASQFFKNZD5L, CDIFFICILEAN, CDIFFTOX, CDIFFICILEBY  No results found for: CALPROTECTIN    Therapeutic Drug Monitoring Labs:  Lab Results   Component Value Date    TPTMINTERP SEE BELOW 04/24/2017     No results found for: PROMETH  No results found for: ANSADAINIT, INFLIXIMAB, INFLIXINTERP    Prior IBD  Meds:  Prednisone  Pentasa    Current IBD Meds:  Humira 40 mg SC every other week- started 5/1/17  Oral MTX 12.5 mg PO weekly with folic acid 1 mg PO daily--started 8/2017    Vaccinations:  Influenza (inactive): 10/2017  Pneumococcal PCV 13: 4/2017  Pneumococcal PCV 23: 7/25/2017  Tetanus (TdaP): 4/2017  HPV (males and females ages 19-27 yo): N/A  Meningococcal (risk factors- complement component deficiency, spleen damage or splenectomy, HIV, traveling to endemic areas, college student residing in residence holder,  recruits):  No risk factors  Hepatitis B: recommended  Lab Results   Component Value Date    HEPBSAB Negative 04/24/2017   Hepatitis A (risk factors- traveling to high endemic areas, chronic liver disease, clotting factor disorders, MSM, illicit drug users):     No results found for: HEPAIGG will order with next labs, recommended  MMR (live vaccine):      Chickenpox status/Varicella (live vaccine): Immune  Lab Results   Component Value Date    VARICELLAZOS 2.98 (H) 04/24/2017    VARICELLAINT Positive (A) 04/24/2017   Zoster (age >49 yo, live vaccine):  N/A    NSAID use/indication:  No    Narcotic use:  No    Alternative/Complementary Meds for IBD:  No    Review of Systems   Constitutional: Negative for chills, fever and weight loss.   HENT:        No oral ulcers, dysphagia, oral thrush   Eyes: Negative for blurred vision, pain and redness.   Respiratory: Negative for cough and shortness of breath.    Cardiovascular: Negative for chest pain.   Gastrointestinal: Negative for abdominal pain, heartburn, nausea and vomiting.   Genitourinary: Negative for dysuria and hematuria.   Musculoskeletal: Negative for back pain and joint pain.   Skin: Negative for rash.   Psychiatric/Behavioral: Negative for depression. The patient is not nervous/anxious and does not have insomnia.      All Medical History/Surgical History/Family History/Social History/Allergies have been reviewed and updated in EMR    Review of  "patient's allergies indicates:  No Known Allergies    Outpatient Prescriptions Marked as Taking for the 1/23/18 encounter (Office Visit) with CHARLY Sevilla   Medication Sig Dispense Refill    ergocalciferol (ERGOCALCIFEROL) 50,000 unit Cap Take 50,000 Units by mouth every 7 days.      HUMIRA PEN PnKt injection INJECT 0.8ML (40MG TOTAL) INTO THE SKIN EVERY 14 DAYS 2 each 1    methotrexate 2.5 MG Tab Take 5 tablets (12.5 mg total) by mouth every 7 days. 20 tablet 2     Vital Signs:  Vitals:    01/23/18 1509   BP: 114/77   Pulse: 75   Resp: 14   Temp: 98.7 °F (37.1 °C)   Weight: 95.8 kg (211 lb 3.2 oz)   Height: 6' 1" (1.854 m)   PainSc: 0-No pain     Physical Exam   Constitutional: He is oriented to person, place, and time. He appears well-developed.   HENT:   Mouth/Throat: No oral lesions.   Eyes: Conjunctivae and EOM are normal. Pupils are equal, round, and reactive to light.   Cardiovascular: Normal rate and regular rhythm.    Pulmonary/Chest: Effort normal and breath sounds normal.   Abdominal: Soft. There is no tenderness.   Genitourinary:   Genitourinary Comments: Small fistula opening at the perianal area at 5 o'clock with no erythema, induration, or fluctuance; no drainage expressed    Musculoskeletal:        Right lower leg: He exhibits no swelling.        Left lower leg: He exhibits no swelling.   Neurological: He is alert and oriented to person, place, and time.   Skin: No rash noted.   Psychiatric: He has a normal mood and affect.   Nursing note and vitals reviewed.    Labs:   Lab Results   Component Value Date    WBC 7.18 11/13/2017    HGB 14.6 11/13/2017    HCT 43.2 11/13/2017    MCV 90 11/13/2017     (H) 11/13/2017     Lab Results   Component Value Date    CREATININE 0.8 11/13/2017    ALBUMIN 3.9 11/13/2017    BILITOT 1.0 11/13/2017    ALKPHOS 56 11/13/2017    AST 19 11/13/2017    ALT 25 11/13/2017     No results found for: NIL, TBAG, TBAGNIL, MITOGENNIL, TBGOLD    Assessment/Plan:  Don " Antonio Matute is a 32 y.o. male with Crohn's disease (ileum, rectal ulcer, perianal fistula/abscess) who last had perianal abscess drained with setons in 3/2017.  Though there was no active inflammation in the ileum since 1998 colonoscopy.  He started humira in 5/2017 and oral MTX in 8/2017 for immunogenicity prevention.  His seton was removed by Dr. Cason in 7/2017 with no abscess recurrence.  Trough [facility] humira levels and ABs were drawn on 1/8/2018 and showed a low level of 2.5 and low ABs of 57.  Due to these levels and ABs, we will increase his humira to weekly humira to see if we can optimize his drug level and overcome the low ABs to prevent higher ABs from forming and he will continue oral MTX 12.5 mg weekly at bedtime with daily folic acid 1 mg.  We are hoping that adequate humira levels will close the fistula.  We will plan for a repeat colonoscopy in fall 2018 if there continues to be no active inflammation and the fistula remains open, we may send him to CRS to discuss options for fistula closure.  We will continue close clinic follow up every 3 months.       # Crohn's disease: ileum, rectal ulcer and perianal fistula/abscess; no active ileitis now (on last colonoscopy 1998 but normal ileum 2007, 1/2017, 4/2017); 4/2017 colonoscopy with rectal ulcer  - continue Humira 40 mg SC every other week-started 5/1/17, next dose due 1/23/2018  - increasing humira to 40 mg SC weekly--new RX sent to [facility] pharmacy--patient to email us once it gets approved and he starts  - continue oral MTX 12.5 mg po weekly with folic acid 1 mg daily  - last colonoscopy showed no active inflammation so plan to repeat fall 2018  - drug monitoring labs:  CBC/CMP (2/2018 ordered) then every 3 months; T spot negative at OSH (2/6/17) - repeat T spot in 2/2018 (ordered); HBsAg negative (2/2016),  HBsAg, HBcAb, HbsAb negative 4/2017    # Ex-smoker  - pt quit fall 2015  -have discussed that CD can be exacerbated with smoking and prevent  meds from working and to avoid second hand smoke if possible (pt is )  - reminded to not restart    # IBD specific health maintenance:  Colorectal cancer risk:    Risks factors: personal history of sporadic adenoma in 1998  - Distribution of colonic disease:  proctitis, ileal  - Year of symptom onset: 1998  - colonoscopy:  every 5 years    - PCP list given to patient to establish care with one of our PCPs--patient still needs to set up PCP    Opthamologic exam recommended yearly - next due now  Dermatologic exam recommended yearly - next due now    Bone health:  Risk of osteopenia/osteoporosis:  Risks factors: none  Vitamin D: continue high dose vit D, OTC Vitamin D3 2000 IU once RX complete--will repeat vitamin D level in 3 months  Lab Results   Component Value Date    [id number] 15 (L) 04/24/2017     Vaccine counseling:  - No LIVE VACCINES--discussed in clinic today  - ID referral placed    Follow up: with JAC Alvarez in 3 months    Total visit time was 40 minutes, more than 50% of which was spent in face-to-face counseling with patient regarding evaluation and management goals and treatment options for Crohn's disease    BRITTA SevillaC  Department of Gastroenterology  Inflammatory Bowel Disease Program    I have personally performed a face to face diagnostic evaluation on this patient. I have reviewed and agree with today's findings and the care plan outlined by JAC Moreno MD   Department of Gastroenterology  Medical Director, Inflammatory Bowel Disease

## 2018-01-24 PROBLEM — Z79.899 HIGH RISK MEDICATION USE: Status: ACTIVE | Noted: 2018-01-24

## 2018-01-24 PROBLEM — Z87.891 EX-SMOKER: Status: ACTIVE | Noted: 2018-01-24

## 2018-01-25 ENCOUNTER — CLINICAL SUPPORT (OUTPATIENT)
Dept: INFECTIOUS DISEASES | Facility: CLINIC | Age: 33
End: 2018-01-25
Payer: COMMERCIAL

## 2018-01-25 ENCOUNTER — OFFICE VISIT (OUTPATIENT)
Dept: INFECTIOUS DISEASES | Facility: CLINIC | Age: 33
End: 2018-01-25
Payer: COMMERCIAL

## 2018-01-25 ENCOUNTER — LAB VISIT (OUTPATIENT)
Dept: LAB | Facility: HOSPITAL | Age: 33
End: 2018-01-25
Attending: INTERNAL MEDICINE
Payer: COMMERCIAL

## 2018-01-25 VITALS
TEMPERATURE: 98 F | HEART RATE: 83 BPM | SYSTOLIC BLOOD PRESSURE: 115 MMHG | WEIGHT: 208.31 LBS | BODY MASS INDEX: 27.61 KG/M2 | HEIGHT: 73 IN | DIASTOLIC BLOOD PRESSURE: 73 MMHG

## 2018-01-25 DIAGNOSIS — K60.3 ANAL FISTULA: ICD-10-CM

## 2018-01-25 DIAGNOSIS — Z79.899 HIGH RISK MEDICATION USE: ICD-10-CM

## 2018-01-25 DIAGNOSIS — A63.0 PENILE VENEREAL WARTS: ICD-10-CM

## 2018-01-25 DIAGNOSIS — K50.813 CROHN'S DISEASE OF BOTH SMALL AND LARGE INTESTINE WITH FISTULA: ICD-10-CM

## 2018-01-25 DIAGNOSIS — K50.813 CROHN'S DISEASE OF BOTH SMALL AND LARGE INTESTINE WITH FISTULA: Primary | ICD-10-CM

## 2018-01-25 LAB
ALBUMIN SERPL BCP-MCNC: 4.2 G/DL
ALP SERPL-CCNC: 50 U/L
ALT SERPL W/O P-5'-P-CCNC: 41 U/L
ANION GAP SERPL CALC-SCNC: 8 MMOL/L
AST SERPL-CCNC: 25 U/L
BASOPHILS # BLD AUTO: 0.08 K/UL
BASOPHILS NFR BLD: 1.1 %
BILIRUB SERPL-MCNC: 0.9 MG/DL
BUN SERPL-MCNC: 13 MG/DL
CALCIUM SERPL-MCNC: 8.5 MG/DL
CHLORIDE SERPL-SCNC: 105 MMOL/L
CO2 SERPL-SCNC: 28 MMOL/L
CREAT SERPL-MCNC: 0.8 MG/DL
DIFFERENTIAL METHOD: ABNORMAL
EOSINOPHIL # BLD AUTO: 0.7 K/UL
EOSINOPHIL NFR BLD: 9.4 %
ERYTHROCYTE [DISTWIDTH] IN BLOOD BY AUTOMATED COUNT: 12.5 %
EST. GFR  (AFRICAN AMERICAN): >60 ML/MIN/1.73 M^2
EST. GFR  (NON AFRICAN AMERICAN): >60 ML/MIN/1.73 M^2
GLUCOSE SERPL-MCNC: 101 MG/DL
HCT VFR BLD AUTO: 42.6 %
HGB BLD-MCNC: 14.2 G/DL
IMM GRANULOCYTES # BLD AUTO: 0.02 K/UL
IMM GRANULOCYTES NFR BLD AUTO: 0.3 %
LYMPHOCYTES # BLD AUTO: 2.5 K/UL
LYMPHOCYTES NFR BLD: 33.6 %
MCH RBC QN AUTO: 29.6 PG
MCHC RBC AUTO-ENTMCNC: 33.3 G/DL
MCV RBC AUTO: 89 FL
MONOCYTES # BLD AUTO: 0.5 K/UL
MONOCYTES NFR BLD: 7 %
NEUTROPHILS # BLD AUTO: 3.7 K/UL
NEUTROPHILS NFR BLD: 48.6 %
NRBC BLD-RTO: 0 /100 WBC
PLATELET # BLD AUTO: 393 K/UL
PMV BLD AUTO: 10.1 FL
POTASSIUM SERPL-SCNC: 4.5 MMOL/L
PROT SERPL-MCNC: 7.9 G/DL
RBC # BLD AUTO: 4.79 M/UL
SODIUM SERPL-SCNC: 141 MMOL/L
WBC # BLD AUTO: 7.52 K/UL

## 2018-01-25 PROCEDURE — 99204 OFFICE O/P NEW MOD 45 MIN: CPT | Mod: S$GLB,,, | Performed by: INTERNAL MEDICINE

## 2018-01-25 PROCEDURE — 36415 COLL VENOUS BLD VENIPUNCTURE: CPT

## 2018-01-25 PROCEDURE — 85025 COMPLETE CBC W/AUTO DIFF WBC: CPT

## 2018-01-25 PROCEDURE — 86481 TB AG RESPONSE T-CELL SUSP: CPT

## 2018-01-25 PROCEDURE — 90636 HEP A/HEP B VACC ADULT IM: CPT | Mod: S$GLB,,, | Performed by: INTERNAL MEDICINE

## 2018-01-25 PROCEDURE — 99999 PR PBB SHADOW E&M-EST. PATIENT-LVL I: CPT | Mod: PBBFAC,,,

## 2018-01-25 PROCEDURE — 99999 PR PBB SHADOW E&M-EST. PATIENT-LVL III: CPT | Mod: PBBFAC,,, | Performed by: INTERNAL MEDICINE

## 2018-01-25 PROCEDURE — 80053 COMPREHEN METABOLIC PANEL: CPT

## 2018-01-25 PROCEDURE — 90471 IMMUNIZATION ADMIN: CPT | Mod: S$GLB,,, | Performed by: INTERNAL MEDICINE

## 2018-01-25 RX ORDER — IMIQUIMOD 12.5 MG/.25G
CREAM TOPICAL
Qty: 24 PACKET | Refills: 5 | Status: SHIPPED | OUTPATIENT
Start: 2018-01-25 | End: 2019-01-25

## 2018-01-25 NOTE — LETTER
January 25, 2018      Kim Coronado, [facility] [place]  [address]  [facility] [address]           [facility]  [address]  [facility] [address]  Phone: [phone]  Fax: [phone]          Patient: Joss Matute   MR Number: [id number]   YOB: 1985   Date of Visit: 1/25/2018       Dear Kim Coronado:    Thank you for referring Joss Matute to me for evaluation. Attached you will find relevant portions of my assessment and plan of care.    If you have questions, please do not hesitate to call me. I look forward to following Joss Matute along with you.    Sincerely,    Nazario Ramirez MD    Enclosure  CC:  No Recipients    If you would like to receive this communication electronically, please contact [email or web address] or [phone] to request more information on [employer] access.    For providers and/or their staff who would like to refer a patient to [facility], please contact us through our one-stop-shop provider referral line, [facility], at [phone].    If you feel you have received this communication in error or would no longer like to receive these types of communications, please e-mail [email or web address]

## 2018-01-25 NOTE — PROGRESS NOTES
Subjective:      Patient ID: Joss Matute is a 32 y.o. male.    Chief Complaint:Immunizations      History of Present Illness  Has Crohn's - started Humira 6 mos ago. Has had pneumonia, Tdap, and influenza vaccinations recently.   Noted penile warts a few months ago on left side of penis.  - wife is asymptomatic.    Review of Systems   Constitution: Negative for chills, decreased appetite, fever, weakness, malaise/fatigue, night sweats, weight gain and weight loss.   HENT: Negative for congestion, ear pain, hearing loss, hoarse voice, sore throat and tinnitus.    Eyes: Negative for blurred vision, redness and visual disturbance.   Cardiovascular: Negative for chest pain, leg swelling and palpitations.   Respiratory: Negative for cough, hemoptysis, shortness of breath and sputum production.    Hematologic/Lymphatic: Negative for adenopathy. Does not bruise/bleed easily.   Skin: Negative for dry skin, itching, rash and suspicious lesions.   Musculoskeletal: Negative for back pain, joint pain, myalgias and neck pain.   Gastrointestinal: Negative for abdominal pain, constipation, diarrhea, heartburn, nausea and vomiting.   Genitourinary: Negative for dysuria, flank pain, frequency, hematuria, hesitancy and urgency.   Neurological: Negative for dizziness, headaches, numbness and paresthesias.   Psychiatric/Behavioral: Negative for depression and memory loss. The patient does not have insomnia and is not nervous/anxious.      Objective:   Physical Exam   Constitutional: He is oriented to person, place, and time. He appears well-developed and well-nourished.   HENT:   Head: Normocephalic and atraumatic.   Eyes: Conjunctivae and EOM are normal. Pupils are equal, round, and reactive to light.   Neck: Neck supple.   Cardiovascular: Normal rate, regular rhythm and normal heart sounds.    Pulmonary/Chest: Effort normal and breath sounds normal.   Abdominal: Soft. Bowel sounds are normal.   Genitourinary: Testes normal.  Circumcised.         Musculoskeletal: Normal range of motion. He exhibits no edema.   Neurological: He is alert and oriented to person, place, and time.   Nursing note and vitals reviewed.    Assessment:       1. Crohn's disease of both small and large intestine with fistula    2. Penile venereal warts          Plan:       Spent over 50% of a 30 minute encounter counseling the patient about HPV and Humira.  Treat warts with Aldara. Discussed with Dr. Spence  HAV/HBV vaccination today.  Shingrix when available.  Continue Humira.

## 2018-01-29 ENCOUNTER — TELEPHONE (OUTPATIENT)
Dept: PHARMACY | Facility: CLINIC | Age: 33
End: 2018-01-29

## 2018-01-29 LAB — T-SPOT TB SCREENING TEST: NORMAL

## 2018-01-29 NOTE — TELEPHONE ENCOUNTER
Good Afternoon,   The Prior authorization for Humira weekly dosing was denied- Appeal submitted 1/29/18. Will update as soon a response is received. Mr. Matute has been notified.    Thanks,  Hina Bansal, PharmD  [facility]  [phone]

## 2018-01-31 ENCOUNTER — TELEPHONE (OUTPATIENT)
Dept: PHARMACY | Facility: CLINIC | Age: 33
End: 2018-01-31

## 2018-02-01 ENCOUNTER — TELEPHONE (OUTPATIENT)
Dept: PHARMACY | Facility: CLINIC | Age: 33
End: 2018-02-01

## 2018-02-01 NOTE — TELEPHONE ENCOUNTER
APPEAL APPROVED- Insurance approved weekly Humira dosing. Pt notified and medication shipped 2/1 for pt to receive 2/2.

## 2018-02-02 ENCOUNTER — TELEPHONE (OUTPATIENT)
Dept: GASTROENTEROLOGY | Facility: CLINIC | Age: 33
End: 2018-02-02

## 2018-02-02 NOTE — TELEPHONE ENCOUNTER
Spoke to patient re weekly humira approved by insurance and shipment will be mailed 2/2/2018.  Next injection due 2/6/2018.  Instructed to take that injection and as long as his shipment is received, start weekly injections on 2/6/2018 so he will take every Tuesday.  Will call/email with any problems and will f/u in clinic as previously scheduled.  Patient verbalizes understanding and agrees with the treatment plan.  Questions answered.    KG

## 2018-02-19 DIAGNOSIS — K50.813 CROHN'S DISEASE OF BOTH SMALL AND LARGE INTESTINE WITH FISTULA: Primary | ICD-10-CM

## 2018-02-19 DIAGNOSIS — K60.3 ANAL FISTULA: ICD-10-CM

## 2018-02-19 RX ORDER — METHOTREXATE 2.5 MG/1
12.5 TABLET ORAL
Qty: 20 TABLET | Refills: 2 | Status: SHIPPED | OUTPATIENT
Start: 2018-02-19 | End: 2018-06-11 | Stop reason: SDUPTHER

## 2018-02-22 ENCOUNTER — CLINICAL SUPPORT (OUTPATIENT)
Dept: INFECTIOUS DISEASES | Facility: CLINIC | Age: 33
End: 2018-02-22
Payer: COMMERCIAL

## 2018-02-22 ENCOUNTER — TELEPHONE (OUTPATIENT)
Dept: PHARMACY | Facility: CLINIC | Age: 33
End: 2018-02-22

## 2018-02-22 PROCEDURE — 90471 IMMUNIZATION ADMIN: CPT | Mod: S$GLB,,, | Performed by: INTERNAL MEDICINE

## 2018-02-22 PROCEDURE — 90636 HEP A/HEP B VACC ADULT IM: CPT | Mod: S$GLB,,, | Performed by: INTERNAL MEDICINE

## 2018-02-22 NOTE — PROGRESS NOTES
Pt received the second dose of his Twinrix vaccination. Pt tolerated the injection well. Pt left the unit in NAD. Return appt made.

## 2018-03-26 ENCOUNTER — TELEPHONE (OUTPATIENT)
Dept: PHARMACY | Facility: CLINIC | Age: 33
End: 2018-03-26

## 2018-04-04 NOTE — TELEPHONE ENCOUNTER
Called both numbers on file and unable to LM on both lines  Called pts wife, no answer, LM explaining we have been trying to reach pt and to please have him call us.  Left my direct line      Certified letter mailed out today [facility]     Tracking Number: [id number]

## 2018-04-04 NOTE — TELEPHONE ENCOUNTER
[facility] has been attempting to reach Mr. Matute regarding prescription for Humira. After numerous unsuccessful attempts, we are sending a postcard to the address on file. At this point in time, no further contact will be made from [facility] until patient responds to our mail correspondence.    If there is anything else we can do to further assist, please let us know.     Thanks,  Dora Hernandez, PharmD  [facility]- Clinical Pharmacist  [phone]

## 2018-04-04 NOTE — TELEPHONE ENCOUNTER
Unreachable telephone # for Humira refill. Co-pay $0 (004). Unable to LVM for callback.   Epic home #: unable to LVM for callback

## 2018-04-04 NOTE — TELEPHONE ENCOUNTER
Mr. Don called back and got scheduled for Humira refill and gave us a better contact # to reach him. Missed dose due on Monday (4/2/18). Will inject when he gets it on 4/5/18 and continue injecting weekly on Thursdays.

## 2018-04-25 ENCOUNTER — TELEPHONE (OUTPATIENT)
Dept: GASTROENTEROLOGY | Facility: CLINIC | Age: 33
End: 2018-04-25

## 2018-04-25 NOTE — TELEPHONE ENCOUNTER
Warning letter created and mailed certified     Tracking number for signature card   [id number]  Tracking number for Certified mail receipt   [id number]

## 2018-04-25 NOTE — TELEPHONE ENCOUNTER
----- Message from CHARLY Sevilla sent at 4/24/2018  7:51 AM CDT -----  Patient no-showed yesterday and had numerous other cancellations.  I think it is time for a warning letter??

## 2018-04-26 ENCOUNTER — TELEPHONE (OUTPATIENT)
Dept: PHARMACY | Facility: CLINIC | Age: 33
End: 2018-04-26

## 2018-04-30 ENCOUNTER — TELEPHONE (OUTPATIENT)
Dept: GASTROENTEROLOGY | Facility: CLINIC | Age: 33
End: 2018-04-30

## 2018-04-30 NOTE — TELEPHONE ENCOUNTER
----- Message from Breanna Sherif sent at 4/30/2018 12:22 PM CDT -----  Contact: self [phone]  adalgisa - is calling to update his info with you - wants to talk to jarvis - please call [phone]

## 2018-04-30 NOTE — TELEPHONE ENCOUNTER
Called and spoke with pt  He apologized several times for missing his last appointment.  His phone broke and he did not have any contacts.    Follow up rescheduled for 05/14 @ 2:20    Appoint reminder mailed per pt

## 2018-05-11 NOTE — PROGRESS NOTES
"     [facility]             Inflammatory Bowel Disease Follow-up  Note              TODAY'S VISIT DATE:  5/11/2018    Chief Complaint:   Chief Complaint   Patient presents with    Crohn's Disease     PCP: Primary Doctor No    Previous History:  Joss Matute is a 32 y.o. male with Crohn's disease: ileum, rectal ulcer and perianal fistula/abscess; no active ileitis now (on last colonoscopy 1998 but normal ileum 2007, 1/2017, 4/2017); 4/2017 colonoscopy with rectal ulcer.  He began with symptoms of a perirectal abscess in December 1996 that was initially drained in the ED.  The abscess recurred in December 1997 and was associated with vomiting, weight loss, and abdominal pain.  His initial colonoscopy in 1998 (no actual record just reported in clinic note) showed moderately severe ileitis with biopsies of the right/transverse/left colon and rectum with mild nonspecific acute and chronic reactive changes.  The transverse colon polyp (likely removed) pathology showed it to be "edematous with mild glandular dysplasia."  At that time he was finally diagnosed with Crohn's disease but unsure of the exact location.  Initially he recalls being given prednisone for 4-5 months and pentasa for maintenance.  By 2002, age 17, he stopped all medications and did not have any GI follow up.  He had a hospital admission for a "flare" with dehydration in 2004 and again was not started on any medications.  He was seen in 2007 for RLQ abdominal cramping, increasing fatigue, and increased bowel frequency with blood and mucous.  He had a repeat colonoscopy in 2/2007 that showed patchy area of mucosa in the distal 4 cm of the TI with normal biopsies.  He had recurrent abscess/fistula in 2008 that spontaneously drained and was seen by CRS and did not wish to take any medications.  He reports intermittent abscess/perianal fistula from [id number] with chronic drainage that would cause he to intermittently seek treatment of " "I&D in the ER.  In 10/2016 he established care with Dr. Bryson when he had a recurrent perianal fistula who planned to start patient on humira after surgical intervention.  He was seen by Dr. Cason on 3/15/2017 and had an EUA with seton placement.  He most recent colonoscopy on 2017 performed by Dr. LEANDRO Spence showed showed perianal skin tag, seton intact and small rectal ulcer with biopsies consistent with patchy active colitis with features of chronic mucosal injury.  He started Humira on 2017.  Setons were removed by Dr. Cason in 2017.  He started oral MTX in 2017 for immunogenicity prevention.  Trough [facility] humira levels were 2.5 with ABs 57 (low) on 2018 so we planned to increase humira to 40 mg SC weekly and start oral MTX 12.5 mg PO weekly with daily folic acid.      Interval History:  - current IBD meds: humira 40 mg SC every 7 days (increased to weekly dosing in 2018), last dose 5/10/2018, next dose 2018; oral MTX 12.5 mg PO weekly, folic acid 1 mg PO daily (forgets to take daily)  - 3 soft to formed Bowel Movements/day with no blood and no nocturnal BMs   - still having clear drainage from fistula (less drainage), no pain, redness, or swelling  - 12 lb weight loss, performing more strenuous work  - constitutional/GI symptoms: no fevers/chills, dysphagia, GERD, abdominal pain  - extraintestinal manifestations: no eye pain/redness, skin lesions/rashes, liver problems, joint pain/swelling/stiffness, dysuria/hematuria    Prior Pertinent Surgeries:  previous fistula repair Xs 2  3/15/2017: EUA with seton placement    Pertinent Endoscopy/Imagin Colonoscopy: (per clinic note path only) showed moderately severe ileitis; right, transverse, left, and rectum showed mild non-specific acute and chronic reactive changes; " transverse polyp path showed it to be edematous with mild glandular dysplasia"  2007 Colonoscopy: patchy area of mucosa in the ileum was nodular and granular " (most distal 4 cm of TI) (path: normal); moderate amount of semisolid stool in the entire colon  5/23/2012 SBFT: normal  1/6/2017 Colonoscopy: fistula in the mauricio-anal area with no active drainage; normal mucosa in the whole colon and TI (no biopsies)  1/18/2017 MRE: no definitive evidence for abnormal mucosal enhancement within the small bowel; suspicious early fistulous tract/fissure within the distal rectum/perianal region tracking along the 9 oclock position rotated into the 6 oclock position and exiting along the anal canal/fissure; no evidence for abnormal enhancing structures within the abdomen/pelvis  4/7/2017 Colonoscopy: perianal skin tag; small rectal ulcer in the rectum (seen on retroflexion) (path: ulcer and adjacent patchy active colitis and features of chronic mucosal injury), remainder colon normal (path: normal); normal TI (path: normal); seton intact     Pertinent Labs:  [facility] [facility] IBD testing--cannot read the original  2/6/17 T spot test negative  2/6/17 HBsAg negative  4/2017 CRP 0.8  4/2017 Vit D 15  4/2017 HBsAg neg, HBcAb neg  4/2017 VZV IgG positive  4/2017 TPMT normal  4/2017 vit B12 395  Lab Results   Component Value Date    SEDRATE 2 04/24/2017    CRP 0.8 04/24/2017     Lab Results   Component Value Date    TTGIGA 7 04/24/2017     04/24/2017     No results found for: TSH, FREET4  Lab Results   Component Value Date    MMLKOKDD39JL 15 (L) 04/24/2017    OHIMTDXU04 395 04/24/2017     Lab Results   Component Value Date    HEPBSAG Negative 04/24/2017    HEPBCAB Negative 04/24/2017     No results found for: BAD01QQIS  Lab Results   Component Value Date    TSPOTSCREN Negative 01/25/2018     Lab Results   Component Value Date    TPTMINTERP Normal 04/24/2017     No results found for: STOOLCULTURE, GHHBTSYYLC3O, WOUILHHQOW5U, CDIFFICILEAN, CDIFFTOX, CDIFFICILEBY  No results found for: CALPROTECTIN    Therapeutic Drug Monitoring Labs:  1/8/2018: Trough [facility] ADA Levels 2.5, ABs 57  (low)    Prior IBD Meds:  Prednisone  Pentasa    Current IBD Meds:  Humira 40 mg SC every other week- started 5/1/17  Oral MTX 12.5 mg PO weekly with folic acid 1 mg PO daily--started 8/2017; increased to weekly dosing in 2/6/2018    Vaccinations:  Influenza (inactive): 10/2017  Pneumococcal PCV 13: 4/24/2017  Pneumococcal PCV 23: 7/25/2017  Tetanus (TdaP): 4/24/2017  HPV (males and females ages 19-25 yo): no risk factors   Meningococcal (risk factors- complement component deficiency, spleen damage or splenectomy, HIV, traveling to endemic areas, college student residing in residence holder,  recruits): no risk factors  Hepatitis B: 1/25/2018, 2/22/2018  Lab Results   Component Value Date    HEPBSAB Negative 04/24/2017   Hepatitis A (risk factors- traveling to high endemic areas, chronic liver disease, clotting factor disorders, MSM, illicit drug users): 1/5/2018, 2/22/2018  No results found for: HEPAIGG   MMR (live vaccine): up to date  Chickenpox status/Varicella (live vaccine): immune  Lab Results   Component Value Date    VARICELLAZOS 2.98 (H) 04/24/2017    VARICELLAINT Positive (A) 04/24/2017   Zoster (age >49 yo, live vaccine): Shingrix when available     NSAID use/indication:  Advil/Aleve, not frequently    Narcotic use:  No    Alternative/Complementary Meds for IBD:  No    Review of Systems   Constitutional: Negative for chills, fever and weight loss.   Eyes: Negative for blurred vision, pain and redness.   Respiratory: Negative for cough and shortness of breath.    Cardiovascular: Negative for chest pain.   Gastrointestinal: Negative for abdominal pain, heartburn, nausea and vomiting.   Genitourinary: Negative for hematuria.   Musculoskeletal: Negative for back pain.   Skin: Negative for rash.   Psychiatric/Behavioral: Negative for depression. The patient is not nervous/anxious.      All Medical History/Surgical History/Family History/Social History/Allergies have been reviewed and updated in  "[facility]    Review of patient's allergies indicates:  No Known Allergies    Outpatient Prescriptions Marked as Taking for the 5/14/18 encounter (Office Visit) with CHARLY Sevilla   Medication Sig Dispense Refill    adalimumab (HUMIRA PEN) PnKt injection Inject 0.8 mLs (40 mg total) into the skin every 7 days. 4 each 5    folic acid (FOLVITE) 1 MG tablet Take 1 tablet (1 mg total) by mouth once daily. 30 tablet 11    methotrexate 2.5 MG Tab Take 5 tablets (12.5 mg total) by mouth every 7 days. 20 tablet 2     Vital Signs:  Vitals:    05/14/18 1424   BP: 118/74   Pulse: 94   Resp: 16   Temp: 98.9 °F (37.2 °C)   Weight: 89.9 kg (198 lb 3.1 oz)   Height: 6' 1" (1.854 m)   PainSc: 0-No pain     Physical Exam   Constitutional: He is oriented to person, place, and time. He appears well-developed.   HENT:   Mouth/Throat: No oral lesions.   Eyes: Conjunctivae and EOM are normal. Pupils are equal, round, and reactive to light.   Cardiovascular: Normal rate and regular rhythm.    Pulmonary/Chest: Effort normal and breath sounds normal.   Abdominal: Soft. There is no tenderness.   Genitourinary:   Genitourinary Comments: Small fistula opening at the perianal area at 5 o'clock with no erythema, induration, or fluctuance; no drainage expressed    Musculoskeletal:        Right lower leg: He exhibits no swelling.        Left lower leg: He exhibits no swelling.   Neurological: He is alert and oriented to person, place, and time.   Skin: No rash noted.   Psychiatric: He has a normal mood and affect.   Nursing note and vitals reviewed.     Labs:   Lab Results   Component Value Date    WBC 7.52 01/25/2018    HGB 14.2 01/25/2018    HCT 42.6 01/25/2018    MCV 89 01/25/2018     (H) 01/25/2018     Lab Results   Component Value Date    CREATININE 0.8 01/25/2018    ALBUMIN 4.2 01/25/2018    BILITOT 0.9 01/25/2018    ALKPHOS 50 (L) 01/25/2018    AST 25 01/25/2018    ALT 41 01/25/2018     Lab Results   Component Value Date    " TSPOTSCREN Negative 01/25/2018     Assessment/Plan:  Joss Matute is a 32 y.o. male with Crohn's disease (ileum, rectal ulcer, perianal fistula/abscess) who last had perianal abscess drained with setons in 3/2017.  Though there was no active inflammation in the ileum since 1998 colonoscopy.  He started weekly humira on 2/6/2018 and started oral MTX 12.5 mg weekly to increase his humira levels and to prevent formation of higher ABs.  He continues with an active fistula though the drainage has improved with no new abscess formation.  We will be repeating ADA levels and ABs prior to his next humira dose on 5/17/2018 and will repeat a colonoscopy in 10/2018 to assess for medication response. If his levels are adequate and the fistula remains open, we may send him to CRS to discuss options for fistula closure.  We will continue close clinic follow up every 3 months.     # Crohn's disease: ileum, rectal ulcer and perianal fistula/abscess; no active ileitis now (on last colonoscopy 1998 but normal ileum 2007, 1/2017, 4/2017); 4/2017 colonoscopy with rectal ulcer  - continue Humira 40 mg SC every 7 days, next dose due 5/17/2017  - continue oral MTX 12.5 mg po weekly with folic acid 1 mg daily   - last colonoscopy showed no active inflammation--plan to repeat 10/2018  - repeat trough [facility] ADA levels and ABs due 5/17/2018 prior to next injection  - drug monitoring labs:  CBC/CMP (today) then every 3 months; T spot negative (1/25/2018); TPMT normal 4/2017; HBsAg negative (2/2016), HBsAg, HBcAb, HbsAb negative 4/2017    # Ex-smoker  - pt quit fall 2015  - have discussed that CD can be exacerbated with smoking and prevent meds from working and to avoid second hand smoke if possible (pt is )  - reminded to not restart    # IBD specific health maintenance:  Colorectal cancer risk:    Risks factors: personal history of sporadic adenoma in 1998  - Distribution of colonic disease:   proctitis, ileal  - Year of symptom  onset: 1998  - colonoscopy:  every 5 years    - PCP list given previously to patient to establish care with one of our PCPs--patient still needs to set up PCP  Opthamologic exam recommended yearly - next due now, again reminded in clinic   Dermatologic exam recommended yearly - next due now, again reminded in clinic     Bone health:  Risk of osteopenia/osteoporosis:  Risks factors: none  Vitamin D: continue OTC Vitamin D3 2000 IU   Lab Results   Component Value Date    HGESXQNB95IU 15 (L) 04/24/2017     Vaccine counseling:  - No LIVE VACCINES--discussed in clinic today  - Continue ID f/u in 7/2018    Follow up: with JAC Alvarez in 3 months with Dr. Spence in clinic    Total visit time was 40 minutes, more than 50% of which was spent in face-to-face counseling with patient regarding evaluation and management goals and treatment options for Crohn's disease    Kim Coronado, FNP-C  Department of Gastroenterology  Inflammatory Bowel Disease Program

## 2018-05-14 ENCOUNTER — OFFICE VISIT (OUTPATIENT)
Dept: GASTROENTEROLOGY | Facility: CLINIC | Age: 33
End: 2018-05-14
Payer: COMMERCIAL

## 2018-05-14 VITALS
BODY MASS INDEX: 26.27 KG/M2 | HEIGHT: 73 IN | DIASTOLIC BLOOD PRESSURE: 74 MMHG | HEART RATE: 94 BPM | RESPIRATION RATE: 16 BRPM | WEIGHT: 198.19 LBS | SYSTOLIC BLOOD PRESSURE: 118 MMHG | TEMPERATURE: 99 F

## 2018-05-14 DIAGNOSIS — E55.9 VITAMIN D DEFICIENCY: ICD-10-CM

## 2018-05-14 DIAGNOSIS — Z79.899 HIGH RISK MEDICATION USE: ICD-10-CM

## 2018-05-14 DIAGNOSIS — K60.3 ANAL FISTULA: ICD-10-CM

## 2018-05-14 DIAGNOSIS — Z87.891 EX-SMOKER: ICD-10-CM

## 2018-05-14 DIAGNOSIS — K50.813 CROHN'S DISEASE OF BOTH SMALL AND LARGE INTESTINE WITH FISTULA: Primary | ICD-10-CM

## 2018-05-14 PROCEDURE — 99999 PR PBB SHADOW E&M-EST. PATIENT-LVL IV: CPT | Mod: PBBFAC,,, | Performed by: NURSE PRACTITIONER

## 2018-05-14 PROCEDURE — 3008F BODY MASS INDEX DOCD: CPT | Mod: CPTII,S$GLB,, | Performed by: NURSE PRACTITIONER

## 2018-05-14 PROCEDURE — 99215 OFFICE O/P EST HI 40 MIN: CPT | Mod: S$GLB,,, | Performed by: NURSE PRACTITIONER

## 2018-05-14 NOTE — PATIENT INSTRUCTIONS
Instructions:  - labs due 5/17/2018 PRIOR to humira injection, due 5/17/2018  - continue humira 40 mg SC every 7 days, due 5/17/2018  - continue MTX 12.5 mg PO weekly with daily folic acid 1 gm   - take vitamin D3 2000 IU daily   - repeat colonoscopy due fall 2018--October 2018, we can schedule at next clinic appointment  - Avoid all NSAIDs (Advil, Ibuprofen, Motrin, Aspirin, Naprosyn, Aleve)  - Yearly Eye exam--due now  - Yearly Skin exam--wear sun block and hats--due now  - Use antibiotics with caution  - Vaccines--continue ID f/u on 7/26/2018  - Follow up with JAC Alvarez with Dr. Spence in clinic in 3 months        - NO LIVE vaccines during therapy  - Live Vaccines Include:   --Intranasal Influenza A/B  --Measles, Mumps, Rubella (MMR)  --Rotavirus  --Typhoid (oral)  --Vaccina (Smallpox)  --Varicella (Chicken Pox)  --Yellow Fever  --Zoster

## 2018-05-17 ENCOUNTER — LAB VISIT (OUTPATIENT)
Dept: LAB | Facility: HOSPITAL | Age: 33
End: 2018-05-17
Attending: INTERNAL MEDICINE
Payer: COMMERCIAL

## 2018-05-17 DIAGNOSIS — K60.3 ANAL FISTULA: ICD-10-CM

## 2018-05-17 DIAGNOSIS — K50.813 CROHN'S DISEASE OF BOTH SMALL AND LARGE INTESTINE WITH FISTULA: ICD-10-CM

## 2018-05-17 LAB
ALBUMIN SERPL BCP-MCNC: 4.1 G/DL
ALP SERPL-CCNC: 46 U/L
ALT SERPL W/O P-5'-P-CCNC: 42 U/L
ANION GAP SERPL CALC-SCNC: 10 MMOL/L
AST SERPL-CCNC: 20 U/L
BASOPHILS # BLD AUTO: 0.07 K/UL
BASOPHILS NFR BLD: 0.9 %
BILIRUB SERPL-MCNC: 0.8 MG/DL
BUN SERPL-MCNC: 17 MG/DL
CALCIUM SERPL-MCNC: 9.3 MG/DL
CHLORIDE SERPL-SCNC: 106 MMOL/L
CO2 SERPL-SCNC: 23 MMOL/L
CREAT SERPL-MCNC: 0.9 MG/DL
DIFFERENTIAL METHOD: ABNORMAL
EOSINOPHIL # BLD AUTO: 0.9 K/UL
EOSINOPHIL NFR BLD: 11 %
ERYTHROCYTE [DISTWIDTH] IN BLOOD BY AUTOMATED COUNT: 12.5 %
EST. GFR  (AFRICAN AMERICAN): >60 ML/MIN/1.73 M^2
EST. GFR  (NON AFRICAN AMERICAN): >60 ML/MIN/1.73 M^2
GLUCOSE SERPL-MCNC: 80 MG/DL
HCT VFR BLD AUTO: 40.7 %
HEPATITIS A ANTIBODY, IGG: NEGATIVE
HGB BLD-MCNC: 13.6 G/DL
IMM GRANULOCYTES # BLD AUTO: 0.03 K/UL
IMM GRANULOCYTES NFR BLD AUTO: 0.4 %
LYMPHOCYTES # BLD AUTO: 2.7 K/UL
LYMPHOCYTES NFR BLD: 33.5 %
MCH RBC QN AUTO: 31 PG
MCHC RBC AUTO-ENTMCNC: 33.4 G/DL
MCV RBC AUTO: 93 FL
MONOCYTES # BLD AUTO: 0.8 K/UL
MONOCYTES NFR BLD: 9.9 %
NEUTROPHILS # BLD AUTO: 3.5 K/UL
NEUTROPHILS NFR BLD: 44.3 %
NRBC BLD-RTO: 0 /100 WBC
PLATELET # BLD AUTO: 348 K/UL
PMV BLD AUTO: 10.3 FL
POTASSIUM SERPL-SCNC: 4 MMOL/L
PROT SERPL-MCNC: 7.4 G/DL
RBC # BLD AUTO: 4.39 M/UL
SODIUM SERPL-SCNC: 139 MMOL/L
WBC # BLD AUTO: 7.9 K/UL

## 2018-05-17 PROCEDURE — 80299 QUANTITATIVE ASSAY DRUG: CPT

## 2018-05-17 PROCEDURE — 85025 COMPLETE CBC W/AUTO DIFF WBC: CPT

## 2018-05-17 PROCEDURE — 36415 COLL VENOUS BLD VENIPUNCTURE: CPT

## 2018-05-17 PROCEDURE — 86790 VIRUS ANTIBODY NOS: CPT

## 2018-05-17 PROCEDURE — 80053 COMPREHEN METABOLIC PANEL: CPT

## 2018-05-21 ENCOUNTER — TELEPHONE (OUTPATIENT)
Dept: PHARMACY | Facility: CLINIC | Age: 33
End: 2018-05-21

## 2018-05-25 LAB — ADALIMUMAB CONCENTRATION & ANTI-ADALIMUMAB ANTIBODY: NORMAL

## 2018-06-11 DIAGNOSIS — K50.813 CROHN'S DISEASE OF BOTH SMALL AND LARGE INTESTINE WITH FISTULA: Primary | ICD-10-CM

## 2018-06-11 DIAGNOSIS — K60.3 ANAL FISTULA: ICD-10-CM

## 2018-06-11 RX ORDER — METHOTREXATE 2.5 MG/1
12.5 TABLET ORAL
Qty: 20 TABLET | Refills: 2 | Status: SHIPPED | OUTPATIENT
Start: 2018-06-11 | End: 2018-08-29 | Stop reason: SDUPTHER

## 2018-06-12 ENCOUNTER — TELEPHONE (OUTPATIENT)
Dept: PHARMACY | Facility: CLINIC | Age: 33
End: 2018-06-12

## 2018-07-19 DIAGNOSIS — K50.813 CROHN'S DISEASE OF BOTH SMALL AND LARGE INTESTINE WITH FISTULA: ICD-10-CM

## 2018-07-20 ENCOUNTER — TELEPHONE (OUTPATIENT)
Dept: PHARMACY | Facility: CLINIC | Age: 33
End: 2018-07-20

## 2018-07-26 ENCOUNTER — CLINICAL SUPPORT (OUTPATIENT)
Dept: INFECTIOUS DISEASES | Facility: CLINIC | Age: 33
End: 2018-07-26
Payer: COMMERCIAL

## 2018-07-26 PROCEDURE — 90471 IMMUNIZATION ADMIN: CPT | Mod: S$GLB,,, | Performed by: INTERNAL MEDICINE

## 2018-07-26 PROCEDURE — 99999 PR PBB SHADOW E&M-EST. PATIENT-LVL I: CPT | Mod: PBBFAC,,,

## 2018-07-26 PROCEDURE — 90636 HEP A/HEP B VACC ADULT IM: CPT | Mod: S$GLB,,, | Performed by: INTERNAL MEDICINE

## 2018-08-06 ENCOUNTER — TELEPHONE (OUTPATIENT)
Dept: GASTROENTEROLOGY | Facility: CLINIC | Age: 33
End: 2018-08-06

## 2018-08-06 NOTE — TELEPHONE ENCOUNTER
----- Message from Anabella Hudson sent at 8/6/2018  1:03 PM CDT -----  Contact: Pt:[phone]  .Rx Refill/Request     Is this a Refill or New Rx: Refilll  Rx Name and Strengthmethotrexate 2.5 MG Tab:    Preferred Pharmacy with phone number: [facility] [address] [place] [address] [address] [employer] [address] [phone] (Phone)  [phone] (Fax)      Communication Preference:Pt:[phone]  Additional Information: Pt states he just spoke with Norma and told her he did have some of medication left. Pt states he does not and he needs a refill.

## 2018-08-06 NOTE — TELEPHONE ENCOUNTER
Received a refill request for a 90 day supply for pts MTX from [facility]  Called and spoke with pt.  I explained we sent in the MTX to [facility] in June with 2 refills so I was wanting to know if he has switched pharmacies.  He stated he will be using [facility] from now on   I explained that we can not give him a 3 mth supply just yet.  He has an appoint on Mon 08/13 @ 2:20 and we will get labs at that time.  Providing labs are OK we will CX [facility] and can send in a 3 mth supply to [facility] at that time.  He can not have 2 open scripts   He stated he has enough medicine to last him and he expressed understanding     Added [facility] to [facility] list and will keep [facility] on there until lab results come back

## 2018-08-06 NOTE — TELEPHONE ENCOUNTER
Called and spoke with pt   I explained he can just contact [facility].  He has refills on that script and there should not be an issue   He expressed understanding and appreciated the call

## 2018-08-10 NOTE — PROGRESS NOTES
"     [facility]             Inflammatory Bowel Disease Follow-up  Note              TODAY'S VISIT DATE:  8/10/2018    Chief Complaint:   Chief Complaint   Patient presents with    Crohn's disease of both sm/lg intestine w/ fistula     PCP: Primary Doctor No    Previous History:  Joss Matute is a 32 y.o. male with Crohn's disease: ileum, rectal ulcer and perianal fistula/abscess; no active ileitis now (on last colonoscopy 1998 but normal ileum 2007, 1/2017, 4/2017); 4/2017 colonoscopy with rectal ulcer.  He began with symptoms of a perirectal abscess in December 1996 that was initially drained in the ED.  The abscess recurred in December 1997 and was associated with vomiting, weight loss, and abdominal pain.  His initial colonoscopy in 1998 (no actual record just reported in clinic note) showed moderately severe ileitis with biopsies of the right/transverse/left colon and rectum with mild nonspecific acute and chronic reactive changes.  The transverse colon polyp (likely removed) pathology showed it to be "edematous with mild glandular dysplasia."  At that time he was finally diagnosed with Crohn's disease but unsure of the exact location.  Initially he recalls being given prednisone for 4-5 months and pentasa for maintenance.  By 2002, age 17, he stopped all medications and did not have any GI follow up.  He had a hospital admission for a "flare" with dehydration in 2004 and again was not started on any medications.  He was seen in 2007 for RLQ abdominal cramping, increasing fatigue, and increased bowel frequency with blood and mucous.  He had a repeat colonoscopy in 2/2007 that showed patchy area of mucosa in the distal 4 cm of the TI with normal biopsies.  He had recurrent abscess/fistula in 2008 that spontaneously drained and was seen by CRS and did not wish to take any medications.  He reports intermittent abscess/perianal fistula from [id number] with chronic drainage that would cause he " "to intermittently seek treatment of I&D in the ER.  In 10/2016 he established care with Dr. Bryson when he had a recurrent perianal fistula who planned to start patient on humira after surgical intervention.  He was seen by Dr. Cason on 3/15/2017 and had an EUA with seton placement.  He most recent colonoscopy on 2017 performed by Dr. LEANDRO Spence showed showed perianal skin tag, seton intact and small rectal ulcer with biopsies consistent with patchy active colitis with features of chronic mucosal injury.  He started Humira on 2017.  Setons were removed by Dr. Cason in 2017.  He started oral MTX in 2017 for immunogenicity prevention.  Trough [facility] humira levels were 2.5 with ABs 57 (low) on 2018 so we planned to increase humira to 40 mg SC weekly and start oral MTX 12.5 mg PO weekly with daily folic acid.  He started humira 40 mg SC every 7 days on 2018.      Interval History:  - current IBD meds: humira 40 mg SC every 7 days (increased to weekly dosing in 2018), last dose 2018, next dose 2018; oral MTX 12.5 mg PO weekly, folic acid 1 mg PO daily (forgets to take daily)   - 3-4 formed Bowel Movements/day with no blood and no nocturnal BMs   - 2018: [facility] Trough Humira Level 15, ABs 30 (humira 40 mg SC every 7 days)  - no drainage from fistula, no pain, redness, or swelling  - constitutional/GI symptoms: no fevers/chills, weight loss, dysphagia, GERD, abdominal pain  - extraintestinal manifestations: no eye pain/redness, skin lesions/rashes, liver problems, joint pain/swelling/stiffness, dysuria/hematuria    Prior Pertinent Surgeries:  previous fistula repair Xs 2  3/15/2017: EUA with seton placement    Pertinent Endoscopy/Imagin Colonoscopy: (per clinic note path only) showed moderately severe ileitis; right, transverse, left, and rectum showed mild non-specific acute and chronic reactive changes; " transverse polyp path showed it to be edematous with mild " "glandular dysplasia"  2/22/2007 Colonoscopy: patchy area of mucosa in the ileum was nodular and granular (most distal 4 cm of TI) (path: normal); moderate amount of semisolid stool in the entire colon  5/23/2012 SBFT: normal  1/6/2017 Colonoscopy: fistula in the mauricio-anal area with no active drainage; normal mucosa in the whole colon and TI (no biopsies)  1/18/2017 MRE: no definitive evidence for abnormal mucosal enhancement within the small bowel; suspicious early fistulous tract/fissure within the distal rectum/perianal region tracking along the 9 oclock position rotated into the 6 oclock position and exiting along the anal canal/fissure; no evidence for abnormal enhancing structures within the abdomen/pelvis  4/7/2017 Colonoscopy: perianal skin tag; small rectal ulcer in the rectum (seen on retroflexion) (path: ulcer and adjacent patchy active colitis and features of chronic mucosal injury), remainder colon normal (path: normal); normal TI (path: normal); seton intact     Pertinent Labs:  [facility] IBD testing--cannot read the original  2/6/17 T spot test negative  2/6/17 HBsAg negative  4/2017 CRP 0.8  4/2017 Vit D 15  4/2017 HBsAg neg, HBcAb neg  4/2017 VZV IgG positive  4/2017 TPMT normal  4/2017 vit B12 395  Lab Results   Component Value Date    SEDRATE 2 04/24/2017    CRP 0.8 04/24/2017     Lab Results   Component Value Date    TTGIGA 7 04/24/2017     04/24/2017     No results found for: TSH, FREET4  Lab Results   Component Value Date    TBQAOCEG24DR 15 (L) 04/24/2017    LDBUQASI56 395 04/24/2017     Lab Results   Component Value Date    HEPBSAG Negative 04/24/2017    HEPBCAB Negative 04/24/2017     No results found for: JHJ47DTJU  Lab Results   Component Value Date    TSPOTSCREN negative 01/25/2018     Lab Results   Component Value Date    TPTMINTERP normal 04/24/2017     No results found for: STOOLCULTURE, WBKCXOYNDH2U, LIPPCMULOH3Q, CDIFFICILEAN, CDIFFTOX, CDIFFICILEBY  No results found for: " CALPROTECTIN    Therapeutic Drug Monitoring Labs:  1/8/2018: Trough [facility] ADA Levels 2.5, ABs 57 (low)  5/17/2018: [facility] Trough Humira Level 15, ABs 30 (low) (humira 40 mg SC every 7 days)    Prior IBD Meds:  Prednisone  Pentasa    Current IBD Meds:  Humira 40 mg SC every other week- started 5/1/17, increased to humira 40 mg SC every 7 days 2/6/2018  Oral MTX 12.5 mg PO weekly with folic acid 1 mg PO daily--started 8/2017; increased to weekly dosing in 2/6/2018    Vaccinations:  Influenza (inactive): 10/2017, Due fall 2018  Pneumococcal PCV 13: 4/24/2017  Pneumococcal PCV 23: 7/25/2017, due 7/2022  Tetanus (TdaP): 4/24/2017, due 2027  HPV (males and females ages 19-25 yo): N/A  Meningococcal (risk factors- complement component deficiency, spleen damage or splenectomy, HIV, traveling to endemic areas, college student residing in residence holder,  recruits): No risk factors  Hepatitis B: 1/25/2018, 2/22/2018, 7/26/2018  Lab Results   Component Value Date    HEPBSAB Negative 04/24/2017   Hepatitis A (risk factors- traveling to high endemic areas, chronic liver disease, clotting factor disorders, MSM, illicit drug users): 1/25/2018, 2/22/2018, 7/26/2018  Lab Results   Component Value Date    HEPAIGG Negative 05/17/2018   MMR (live vaccine):        Chickenpox status/Varicella (live vaccine): Immune  Lab Results   Component Value Date    VARICELLAZOS 2.98 (H) 04/24/2017    VARICELLAINT Positive (A) 04/24/2017   Zoster (age >51 yo, live vaccine): Shingrix series recommended    NSAID use/indication:  Advil/Aleve, not frequently    Narcotic use:  No    Alternative/Complementary Meds for IBD:  No    Review of Systems   Constitutional: Negative for chills, fever and weight loss.   HENT:        No oral ulcers, dysphagia, oral thrush   Eyes: Negative for blurred vision, pain and redness.   Respiratory: Negative for cough and shortness of breath.    Cardiovascular: Negative for chest pain.   Gastrointestinal: Negative  "for abdominal pain, heartburn, nausea and vomiting.   Genitourinary: Negative for dysuria and hematuria.   Musculoskeletal: Negative for back pain and joint pain.   Skin: Negative for rash.   Psychiatric/Behavioral: Negative for depression. The patient is not nervous/anxious and does not have insomnia.      All Medical History/Surgical History/Family History/Social History/Allergies have been reviewed and updated in EMR    Review of patient's allergies indicates:  No Known Allergies  Outpatient Medications Marked as Taking for the 8/13/18 encounter (Office Visit) with CHARLY Sevilla   Medication Sig Dispense Refill    adalimumab (HUMIRA PEN) PnKt injection Inject 0.8 mLs (40 mg total) into the skin every 7 days. 3.2 mL 5    folic acid (FOLVITE) 1 MG tablet Take 1 tablet (1 mg total) by mouth once daily. 30 tablet 11    imiquimod (ALDARA) 5 % cream Apply to affected area three times weekly 24 packet 5    methotrexate 2.5 MG Tab Take 5 tablets (12.5 mg total) by mouth every 7 days. 20 tablet 2     Vital Signs:  Vitals:    08/13/18 1425   BP: 123/78   Pulse: 74   Resp: 16   Temp: 98.1 °F (36.7 °C)   Weight: 91.5 kg (201 lb 11.5 oz)   Height: 6' 1" (1.854 m)   PainSc: 0-No pain     Physical Exam   Constitutional: He is oriented to person, place, and time. He appears well-developed.   HENT:   Mouth/Throat: No oral lesions.   Eyes: Conjunctivae and EOM are normal. Pupils are equal, round, and reactive to light.   Cardiovascular: Normal rate and regular rhythm.   Pulmonary/Chest: Effort normal and breath sounds normal.   Abdominal: Soft. There is no tenderness.   Genitourinary:   Genitourinary Comments: Fistula opening seen with no abscess, tenderness, or erythema   Musculoskeletal:        Right lower leg: He exhibits no swelling.        Left lower leg: He exhibits no swelling.   Neurological: He is alert and oriented to person, place, and time.   Skin: No rash noted.   Psychiatric: He has a normal mood and affect. "   Nursing note and vitals reviewed.      Labs:   Lab Results   Component Value Date    WBC 7.90 05/17/2018    HGB 13.6 (L) 05/17/2018    HCT 40.7 05/17/2018    MCV 93 05/17/2018     05/17/2018     Lab Results   Component Value Date    CREATININE 0.9 05/17/2018    ALBUMIN 4.1 05/17/2018    BILITOT 0.8 05/17/2018    ALKPHOS 46 (L) 05/17/2018    AST 20 05/17/2018    ALT 42 05/17/2018     Lab Results   Component Value Date    TSPOTSCREN Negative 01/25/2018     Assessment/Plan:  Joss Matute is a 32 y.o. male with Crohn's disease (ileum, rectal ulcer, perianal fistula/abscess) who last had perianal abscess drained with setons in 3/2017.  Though there was no active inflammation in the ileum since 1998 colonoscopy.  Trough [facility] levels were 15 with ABs of 30 on 5/17/2018 on humira 40 mg SC every 7 days and oral MTX 12.5 mg weekly.  He has had no drainage from his fistula and continues with no active abscess.  We will repeat a colonoscopy in 10/2018 to assess for medication response.  If his fistula remains opens we may send him to CRS to discuss options for fistula closure.  We will continue close clinic follow up every 3 months.  If humira proves to be ineffective, he is naive to Imuran, remicade, cimzia, entyvio, and stelara.      # Crohn's disease: ileum, rectal ulcer and perianal fistula/abscess; no active ileitis now (on last colonoscopy 1998 but normal ileum 2007, 1/2017, 4/2017); 4/2017 colonoscopy with rectal ulcer  - continue Humira 40 mg SC every 7 days, next dose due 8/9/2018  - continue oral MTX 12.5 mg po weekly with folic acid 1 mg daily   - last colonoscopy showed no active inflammation--plan to repeat 10/2018  - drug monitoring labs:  CBC/CMP (today) then every 3 months; T spot negative (1/25/2018); TPMT normal 4/2017; HBsAg negative (2/2016), HBsAg, HBcAb, HbsAb negative 4/2017    # Ex-smoker  - pt quit fall 2015  - have discussed that CD can be exacerbated with smoking and prevent meds from  working and to avoid second hand smoke if possible (pt is )  - reminded to not restart    # IBD specific health maintenance:  Colorectal cancer risk:    Risks factors: personal history of sporadic adenoma in 1998  - Distribution of colonic disease:  proctitis, ileal  - Year of symptom onset: 1998  - colonoscopy:  every 5 years    - PCP list given previously to patient to establish care with one of our PCPs--patient still needs to set up PCP  Opthamologic exam recommended yearly - next due now, again reminded in clinic   Dermatologic exam recommended yearly - next due now, again reminded in clinic     Bone health:  Risk of osteopenia/osteoporosis:  Risks factors: none  Vitamin D: continue OTC Vitamin D3 2000 IU, repeat today  Lab Results   Component Value Date    DMDVQAFM43WH 15 (L) 04/24/2017     Vaccine counseling:  - No LIVE VACCINES--discussed in clinic today  - Continue ID f/u     Follow up: with JAC Alvarez in 3 months    Total visit time was 25 minutes, more than 50% of which was spent in face-to-face counseling with patient regarding evaluation and management goals and treatment options for Crohn's disease    MONICA SevillaP-C  Department of Gastroenterology  Inflammatory Bowel Disease Program

## 2018-08-13 ENCOUNTER — LAB VISIT (OUTPATIENT)
Dept: LAB | Facility: HOSPITAL | Age: 33
End: 2018-08-13
Payer: COMMERCIAL

## 2018-08-13 ENCOUNTER — OFFICE VISIT (OUTPATIENT)
Dept: GASTROENTEROLOGY | Facility: CLINIC | Age: 33
End: 2018-08-13
Payer: COMMERCIAL

## 2018-08-13 ENCOUNTER — TELEPHONE (OUTPATIENT)
Dept: ENDOSCOPY | Facility: HOSPITAL | Age: 33
End: 2018-08-13

## 2018-08-13 VITALS
BODY MASS INDEX: 26.74 KG/M2 | RESPIRATION RATE: 16 BRPM | SYSTOLIC BLOOD PRESSURE: 123 MMHG | HEIGHT: 73 IN | TEMPERATURE: 98 F | HEART RATE: 74 BPM | DIASTOLIC BLOOD PRESSURE: 78 MMHG | WEIGHT: 201.75 LBS

## 2018-08-13 DIAGNOSIS — Z79.899 HIGH RISK MEDICATION USE: ICD-10-CM

## 2018-08-13 DIAGNOSIS — Z87.891 EX-SMOKER: ICD-10-CM

## 2018-08-13 DIAGNOSIS — Z12.11 SPECIAL SCREENING FOR MALIGNANT NEOPLASMS, COLON: Primary | ICD-10-CM

## 2018-08-13 DIAGNOSIS — K50.813 CROHN'S DISEASE OF BOTH SMALL AND LARGE INTESTINE WITH FISTULA: Primary | ICD-10-CM

## 2018-08-13 DIAGNOSIS — E55.9 VITAMIN D DEFICIENCY: ICD-10-CM

## 2018-08-13 DIAGNOSIS — K50.813 CROHN'S DISEASE OF BOTH SMALL AND LARGE INTESTINE WITH FISTULA: ICD-10-CM

## 2018-08-13 DIAGNOSIS — K60.3 ANAL FISTULA: ICD-10-CM

## 2018-08-13 LAB
25(OH)D3+25(OH)D2 SERPL-MCNC: 23 NG/ML
ALBUMIN SERPL BCP-MCNC: 4 G/DL
ALP SERPL-CCNC: 53 U/L
ALT SERPL W/O P-5'-P-CCNC: 59 U/L
ANION GAP SERPL CALC-SCNC: 6 MMOL/L
AST SERPL-CCNC: 28 U/L
BASOPHILS # BLD AUTO: 0.09 K/UL
BASOPHILS NFR BLD: 1.2 %
BILIRUB SERPL-MCNC: 0.7 MG/DL
BUN SERPL-MCNC: 12 MG/DL
CALCIUM SERPL-MCNC: 9.5 MG/DL
CHLORIDE SERPL-SCNC: 106 MMOL/L
CO2 SERPL-SCNC: 29 MMOL/L
CREAT SERPL-MCNC: 0.8 MG/DL
DIFFERENTIAL METHOD: ABNORMAL
EOSINOPHIL # BLD AUTO: 0.8 K/UL
EOSINOPHIL NFR BLD: 10.4 %
ERYTHROCYTE [DISTWIDTH] IN BLOOD BY AUTOMATED COUNT: 12.9 %
EST. GFR  (AFRICAN AMERICAN): >60 ML/MIN/1.73 M^2
EST. GFR  (NON AFRICAN AMERICAN): >60 ML/MIN/1.73 M^2
GLUCOSE SERPL-MCNC: 90 MG/DL
HCT VFR BLD AUTO: 42 %
HGB BLD-MCNC: 14 G/DL
IMM GRANULOCYTES # BLD AUTO: 0.02 K/UL
IMM GRANULOCYTES NFR BLD AUTO: 0.3 %
LYMPHOCYTES # BLD AUTO: 2.9 K/UL
LYMPHOCYTES NFR BLD: 38.7 %
MCH RBC QN AUTO: 31.4 PG
MCHC RBC AUTO-ENTMCNC: 33.3 G/DL
MCV RBC AUTO: 94 FL
MONOCYTES # BLD AUTO: 0.8 K/UL
MONOCYTES NFR BLD: 10.1 %
NEUTROPHILS # BLD AUTO: 2.9 K/UL
NEUTROPHILS NFR BLD: 39.3 %
NRBC BLD-RTO: 0 /100 WBC
PLATELET # BLD AUTO: 353 K/UL
PMV BLD AUTO: 10 FL
POTASSIUM SERPL-SCNC: 4.4 MMOL/L
PROT SERPL-MCNC: 7.3 G/DL
RBC # BLD AUTO: 4.46 M/UL
SODIUM SERPL-SCNC: 141 MMOL/L
WBC # BLD AUTO: 7.49 K/UL

## 2018-08-13 PROCEDURE — 82306 VITAMIN D 25 HYDROXY: CPT

## 2018-08-13 PROCEDURE — 85025 COMPLETE CBC W/AUTO DIFF WBC: CPT

## 2018-08-13 PROCEDURE — 99214 OFFICE O/P EST MOD 30 MIN: CPT | Mod: S$GLB,,, | Performed by: NURSE PRACTITIONER

## 2018-08-13 PROCEDURE — 99999 PR PBB SHADOW E&M-EST. PATIENT-LVL IV: CPT | Mod: PBBFAC,,, | Performed by: NURSE PRACTITIONER

## 2018-08-13 PROCEDURE — 86706 HEP B SURFACE ANTIBODY: CPT

## 2018-08-13 PROCEDURE — 36415 COLL VENOUS BLD VENIPUNCTURE: CPT

## 2018-08-13 PROCEDURE — 80053 COMPREHEN METABOLIC PANEL: CPT

## 2018-08-13 PROCEDURE — 3008F BODY MASS INDEX DOCD: CPT | Mod: CPTII,S$GLB,, | Performed by: NURSE PRACTITIONER

## 2018-08-13 PROCEDURE — 86790 VIRUS ANTIBODY NOS: CPT

## 2018-08-13 RX ORDER — POLYETHYLENE GLYCOL 3350, SODIUM SULFATE ANHYDROUS, SODIUM BICARBONATE, SODIUM CHLORIDE, POTASSIUM CHLORIDE 236; 22.74; 6.74; 5.86; 2.97 G/4L; G/4L; G/4L; G/4L; G/4L
4 POWDER, FOR SOLUTION ORAL ONCE
Qty: 4000 ML | Refills: 0 | Status: SHIPPED | OUTPATIENT
Start: 2018-08-13 | End: 2018-08-14

## 2018-08-13 NOTE — PATIENT INSTRUCTIONS
Instructions:  - labs today  - continue Humira 40 mg SC every 7 days, next dose due 8/9/2019  - continue oral MTX 12.5 mg po weekly with folic acid 1 mg daily  -  continue OTC Vitamin D3 2000 IU   - colonoscopy 10/2018--ordered  - Avoid all NSAIDs (Advil, Ibuprofen, Motrin, Aspirin, Naprosyn, Aleve)  - Establish care with a PCP  - Yearly Eye exam  - Yearly Skin exam--wear sun block and hats  - Use antibiotics with caution  - Vaccines needed:  Flu shot yearly  Tetanus every 10 years  Shingrix series   - Follow up with JAC Alvarez in 3 months

## 2018-08-14 ENCOUNTER — TELEPHONE (OUTPATIENT)
Dept: PHARMACY | Facility: CLINIC | Age: 33
End: 2018-08-14

## 2018-08-14 LAB
HBV SURFACE AB SER-ACNC: POSITIVE M[IU]/ML
HEPATITIS A ANTIBODY, IGG: POSITIVE

## 2018-08-16 ENCOUNTER — PATIENT MESSAGE (OUTPATIENT)
Dept: GASTROENTEROLOGY | Facility: CLINIC | Age: 33
End: 2018-08-16

## 2018-08-16 DIAGNOSIS — R79.89 ELEVATED LFTS: Primary | ICD-10-CM

## 2018-08-16 NOTE — TELEPHONE ENCOUNTER
Called and spoke with pt  I read him the portal message   He is not taking any herbal supplements and I got his labs scheduled for 08/29 @ 5:00 and he can go anytime before

## 2018-08-29 ENCOUNTER — LAB VISIT (OUTPATIENT)
Dept: LAB | Facility: HOSPITAL | Age: 33
End: 2018-08-29
Payer: COMMERCIAL

## 2018-08-29 DIAGNOSIS — R79.89 ELEVATED LFTS: ICD-10-CM

## 2018-08-29 DIAGNOSIS — K50.813 CROHN'S DISEASE OF BOTH SMALL AND LARGE INTESTINE WITH FISTULA: Primary | ICD-10-CM

## 2018-08-29 DIAGNOSIS — K60.3 ANAL FISTULA: ICD-10-CM

## 2018-08-29 LAB
ALBUMIN SERPL BCP-MCNC: 4.3 G/DL
ALP SERPL-CCNC: 44 U/L
ALT SERPL W/O P-5'-P-CCNC: 58 U/L
AST SERPL-CCNC: 30 U/L
BILIRUB DIRECT SERPL-MCNC: 0.2 MG/DL
BILIRUB SERPL-MCNC: 0.7 MG/DL
PROT SERPL-MCNC: 7.6 G/DL

## 2018-08-29 PROCEDURE — 80076 HEPATIC FUNCTION PANEL: CPT

## 2018-08-29 PROCEDURE — 36415 COLL VENOUS BLD VENIPUNCTURE: CPT

## 2018-08-29 RX ORDER — METHOTREXATE 2.5 MG/1
12.5 TABLET ORAL
Qty: 20 TABLET | Refills: 2 | Status: SHIPPED | OUTPATIENT
Start: 2018-08-29 | End: 2019-02-13

## 2018-08-30 ENCOUNTER — TELEPHONE (OUTPATIENT)
Dept: GASTROENTEROLOGY | Facility: CLINIC | Age: 33
End: 2018-08-30

## 2018-08-30 DIAGNOSIS — Z79.899 HIGH RISK MEDICATION USE: ICD-10-CM

## 2018-08-30 DIAGNOSIS — K50.813 CROHN'S DISEASE OF BOTH SMALL AND LARGE INTESTINE WITH FISTULA: ICD-10-CM

## 2018-08-30 DIAGNOSIS — R79.89 ELEVATED LFTS: Primary | ICD-10-CM

## 2018-08-30 DIAGNOSIS — Z87.891 EX-SMOKER: ICD-10-CM

## 2018-08-30 NOTE — TELEPHONE ENCOUNTER
Left message for patient to return call.  Plan to discuss LFTs.  ALT still minimally elevated.  Discussed with Dr. Spence...Will stop oral MTX and will repeat LFTs in two weeks    KG

## 2018-08-30 NOTE — TELEPHONE ENCOUNTER
----- Message from Kathleen Swain MA sent at 8/30/2018  1:21 PM CDT -----  Contact: [phone]  Test Results    Type of Test: Live function test    Date of Test: 8/29/18    Communication Preference: [phone]

## 2018-08-30 NOTE — TELEPHONE ENCOUNTER
Reviewed LFT's with pt.    Pt will stop MTX.    Pt states he is currently abstaining from ETOH.    Pt is scheduled for repeat LFT on 9/14/2018.    Pt verbalized understanding and has no further questions at this time.

## 2018-08-30 NOTE — TELEPHONE ENCOUNTER
----- Message from Lyssa Henderson sent at 8/30/2018  3:30 PM CDT -----  Contact: Self- [phone]  Cliff- pt states he is returning a missed call from - please contact pt at [phone]

## 2018-09-10 ENCOUNTER — TELEPHONE (OUTPATIENT)
Dept: PHARMACY | Facility: CLINIC | Age: 33
End: 2018-09-10

## 2018-09-14 ENCOUNTER — PATIENT MESSAGE (OUTPATIENT)
Dept: GASTROENTEROLOGY | Facility: CLINIC | Age: 33
End: 2018-09-14

## 2018-09-14 ENCOUNTER — LAB VISIT (OUTPATIENT)
Dept: LAB | Facility: HOSPITAL | Age: 33
End: 2018-09-14
Payer: COMMERCIAL

## 2018-09-14 DIAGNOSIS — R79.89 ELEVATED LFTS: ICD-10-CM

## 2018-09-14 DIAGNOSIS — Z79.899 HIGH RISK MEDICATION USE: ICD-10-CM

## 2018-09-14 DIAGNOSIS — K50.813 CROHN'S DISEASE OF BOTH SMALL AND LARGE INTESTINE WITH FISTULA: ICD-10-CM

## 2018-09-14 LAB
ALBUMIN SERPL BCP-MCNC: 3.9 G/DL
ALP SERPL-CCNC: 40 U/L
ALT SERPL W/O P-5'-P-CCNC: 32 U/L
AST SERPL-CCNC: 17 U/L
BILIRUB DIRECT SERPL-MCNC: 0.2 MG/DL
BILIRUB SERPL-MCNC: 0.5 MG/DL
PROT SERPL-MCNC: 6.9 G/DL

## 2018-09-14 PROCEDURE — 80076 HEPATIC FUNCTION PANEL: CPT

## 2018-09-14 PROCEDURE — 36415 COLL VENOUS BLD VENIPUNCTURE: CPT

## 2018-09-19 ENCOUNTER — PATIENT MESSAGE (OUTPATIENT)
Dept: ADMINISTRATIVE | Facility: OTHER | Age: 33
End: 2018-09-19

## 2018-10-09 ENCOUNTER — ANESTHESIA EVENT (OUTPATIENT)
Dept: ENDOSCOPY | Facility: HOSPITAL | Age: 33
End: 2018-10-09
Payer: COMMERCIAL

## 2018-10-09 NOTE — ANESTHESIA PREPROCEDURE EVALUATION
10/09/2018  Joss Matute is a 32 y.o., male with pmh listed below presenting for colonoscopy.  Past Medical History:   Diagnosis Date    Crohn's disease      Past Surgical History:   Procedure Laterality Date    ABSCESS DRAINAGE      ANAL FISTULOTOMY      COLONOSCOPY      COLONOSCOPY N/A 4/7/2017    Procedure: COLONOSCOPY;  Surgeon: Bubba Spence MD;  Location: [facility] (4TH FLR);  Service: Endoscopy;  Laterality: N/A;    COLONOSCOPY N/A 4/7/2017    Performed by Bubba Spence MD at [facility] ENDO (4TH FLR)    PLACEMENT-SETON DRAIN N/A 3/15/2017    Performed by Javier Cason MD at [facility] OR 2ND FLR    UPPER GASTROINTESTINAL ENDOSCOPY       Lab Results   Component Value Date    WBC 7.49 08/13/2018    HGB 14.0 08/13/2018    HCT 42.0 08/13/2018    MCV 94 08/13/2018     (H) 08/13/2018       Chemistry        Component Value Date/Time     08/13/2018 1533    K 4.4 08/13/2018 1533     08/13/2018 1533    CO2 29 08/13/2018 1533    BUN 12 08/13/2018 1533    CREATININE 0.8 08/13/2018 1533    GLU 90 08/13/2018 1533        Component Value Date/Time    CALCIUM 9.5 08/13/2018 1533    ALKPHOS 40 (L) 09/14/2018 1516    AST 17 09/14/2018 1516    ALT 32 09/14/2018 1516    BILITOT 0.5 09/14/2018 1516    ESTGFRAFRICA >60.0 08/13/2018 1533    EGFRNONAA >60.0 08/13/2018 1533            Anesthesia Evaluation    I have reviewed the Patient Summary Reports.     I have reviewed the Medications.     Review of Systems  Anesthesia Hx:  No problems with previous Anesthesia  Denies Family Hx of Anesthesia complications.   Denies Personal Hx of Anesthesia complications.   Hematology/Oncology:  Hematology Normal   Oncology Normal     EENT/Dental:EENT/Dental Normal   Cardiovascular:  Cardiovascular Normal Exercise tolerance: good     Pulmonary:  Pulmonary Normal Cold last week. No reported congestion    Hepatic/GI:   Bowel Prep. Crohn's   Musculoskeletal:  Musculoskeletal Normal    Neurological:  Neurology Normal    Endocrine:  Endocrine Normal    Psych:   anxiety          Physical Exam  General:  Well nourished    Airway/Jaw/Neck:  Airway Findings: Mouth Opening: Normal Tongue: Normal  General Airway Assessment: Adult  Mallampati: II  Jaw/Neck Findings:  Neck ROM: Normal ROM      Dental:  Dental Findings: In tact   Chest/Lungs:  Chest/Lungs Findings: Clear to auscultation, Normal Respiratory Rate     Heart/Vascular:  Heart Findings: Rate: Normal  Rhythm: Regular Rhythm  Sounds: Normal     Abdomen:  Abdomen Findings: Normal    Musculoskeletal:  Musculoskeletal Findings: Normal   Skin:  Skin Findings: Normal    Mental Status:  Mental Status Findings: Normal        Anesthesia Plan  Type of Anesthesia, risks & benefits discussed:  Anesthesia Type:  general  Patient's Preference:   Intra-op Monitoring Plan: standard ASA monitors  Intra-op Monitoring Plan Comments:   Post Op Pain Control Plan: multimodal analgesia, IV/PO Opioids PRN and per primary service following discharge from PACU  Post Op Pain Control Plan Comments:   Induction:   IV  Beta Blocker:  Patient is not currently on a Beta-Blocker (No further documentation required).       Informed Consent: Patient understands risks and agrees with Anesthesia plan.  Questions answered. Anesthesia consent signed with patient.  ASA Score: 2     Day of Surgery Review of History & Physical: I have interviewed and examined the patient. I have reviewed the patient's H&P dated: 8/13/18. There are no significant changes.  H&P update referred to the surgeon.     Anesthesia Plan Notes: NPO confirmed        Ready For Surgery From Anesthesia Perspective.

## 2018-10-10 ENCOUNTER — ANESTHESIA (OUTPATIENT)
Dept: ENDOSCOPY | Facility: HOSPITAL | Age: 33
End: 2018-10-10
Payer: COMMERCIAL

## 2018-10-10 ENCOUNTER — HOSPITAL ENCOUNTER (OUTPATIENT)
Facility: HOSPITAL | Age: 33
Discharge: HOME | End: 2018-10-10
Attending: INTERNAL MEDICINE | Admitting: INTERNAL MEDICINE
Payer: COMMERCIAL

## 2018-10-10 VITALS
DIASTOLIC BLOOD PRESSURE: 72 MMHG | HEART RATE: 75 BPM | TEMPERATURE: 98 F | OXYGEN SATURATION: 99 % | RESPIRATION RATE: 17 BRPM | SYSTOLIC BLOOD PRESSURE: 110 MMHG

## 2018-10-10 DIAGNOSIS — K60.3 ANAL FISTULA: Primary | ICD-10-CM

## 2018-10-10 DIAGNOSIS — K50.813 CROHN'S DISEASE OF BOTH SMALL AND LARGE INTESTINE WITH FISTULA: ICD-10-CM

## 2018-10-10 DIAGNOSIS — K50.10 CROHN'S DISEASE OF LARGE INTESTINE: ICD-10-CM

## 2018-10-10 PROCEDURE — 45380 COLONOSCOPY AND BIOPSY: CPT | Mod: ,,, | Performed by: INTERNAL MEDICINE

## 2018-10-10 PROCEDURE — 45380 COLONOSCOPY AND BIOPSY: CPT | Performed by: INTERNAL MEDICINE

## 2018-10-10 PROCEDURE — 37000008 HC ANESTHESIA 1ST 15 MINUTES: Performed by: INTERNAL MEDICINE

## 2018-10-10 PROCEDURE — 88305 TISSUE EXAM BY PATHOLOGIST: CPT | Performed by: PATHOLOGY

## 2018-10-10 PROCEDURE — 27201012 HC FORCEPS, HOT/COLD, DISP: Performed by: INTERNAL MEDICINE

## 2018-10-10 PROCEDURE — 63600175 PHARM REV CODE 636 W HCPCS: Performed by: NURSE ANESTHETIST, CERTIFIED REGISTERED

## 2018-10-10 PROCEDURE — E9220 PRA ENDO ANESTHESIA: HCPCS | Mod: ,,, | Performed by: NURSE ANESTHETIST, CERTIFIED REGISTERED

## 2018-10-10 PROCEDURE — 37000009 HC ANESTHESIA EA ADD 15 MINS: Performed by: INTERNAL MEDICINE

## 2018-10-10 PROCEDURE — 25000003 PHARM REV CODE 250: Performed by: INTERNAL MEDICINE

## 2018-10-10 RX ORDER — SODIUM CHLORIDE 9 MG/ML
INJECTION, SOLUTION INTRAVENOUS CONTINUOUS
Status: DISCONTINUED | OUTPATIENT
Start: 2018-10-11 | End: 2018-10-10 | Stop reason: HOSPADM

## 2018-10-10 RX ORDER — PROPOFOL 10 MG/ML
VIAL (ML) INTRAVENOUS
Status: DISCONTINUED | OUTPATIENT
Start: 2018-10-10 | End: 2018-10-10

## 2018-10-10 RX ORDER — SODIUM CHLORIDE 0.9 % (FLUSH) 0.9 %
3 SYRINGE (ML) INJECTION
Status: DISCONTINUED | OUTPATIENT
Start: 2018-10-10 | End: 2018-10-10 | Stop reason: HOSPADM

## 2018-10-10 RX ORDER — PROPOFOL 10 MG/ML
VIAL (ML) INTRAVENOUS CONTINUOUS PRN
Status: DISCONTINUED | OUTPATIENT
Start: 2018-10-10 | End: 2018-10-10

## 2018-10-10 RX ORDER — LIDOCAINE HCL/PF 100 MG/5ML
SYRINGE (ML) INTRAVENOUS
Status: DISCONTINUED | OUTPATIENT
Start: 2018-10-10 | End: 2018-10-10

## 2018-10-10 RX ADMIN — PROPOFOL 50 MG: 10 INJECTION, EMULSION INTRAVENOUS at 08:10

## 2018-10-10 RX ADMIN — SODIUM CHLORIDE: 0.9 INJECTION, SOLUTION INTRAVENOUS at 07:10

## 2018-10-10 RX ADMIN — LIDOCAINE HYDROCHLORIDE 50 MG: 20 INJECTION, SOLUTION INTRAVENOUS at 08:10

## 2018-10-10 RX ADMIN — PROPOFOL 250 MCG/KG/MIN: 10 INJECTION, EMULSION INTRAVENOUS at 08:10

## 2018-10-10 RX ADMIN — PROPOFOL 100 MG: 10 INJECTION, EMULSION INTRAVENOUS at 08:10

## 2018-10-10 RX ADMIN — SODIUM CHLORIDE: 0.9 INJECTION, SOLUTION INTRAVENOUS at 08:10

## 2018-10-10 NOTE — [employer] PATIENT INSTRUCTIONS
Discharge Summary/Instructions after an Endoscopic Procedure  Patient Name: Joss Matute  Patient MRN: [id number]  Patient YOB: 1985  Wednesday, October 10, 2018  Bubba Spence MD  RESTRICTIONS:  During your procedure today, you received medications for sedation.  These   medications may affect your judgment, balance and coordination.  Therefore,   for 24 hours, you have the following restrictions:   - DO NOT drive a car, operate machinery, make legal/financial decisions,   sign important papers or drink alcohol.    ACTIVITY:  Today: no heavy lifting, straining or running due to procedural   sedation/anesthesia.  The following day: return to full activity including work.  DIET:  Eat and drink normally unless instructed otherwise.     TREATMENT FOR COMMON SIDE EFFECTS:  - Mild abdominal pain, nausea, belching, bloating or excessive gas:  rest,   eat lightly and use a heating pad.  - Sore Throat: treat with throat lozenges and/or gargle with warm salt   water.  - Because air was used during the procedure, expelling large amounts of air   from your rectum or belching is normal.  - If a bowel prep was taken, you may not have a bowel movement for 1-3 days.    This is normal.  SYMPTOMS TO WATCH FOR AND REPORT TO YOUR PHYSICIAN:  1. Abdominal pain or bloating, other than gas cramps.  2. Chest pain.  3. Back pain.  4. Signs of infection such as: chills or fever occurring within 24 hours   after the procedure.  5. Rectal bleeding, which would show as bright red, maroon, or black stools.   (A tablespoon of blood from the rectum is not serious, especially if   hemorrhoids are present.)  6. Vomiting.  7. Weakness or dizziness.  GO DIRECTLY TO THE NEAREST EMERGENCY ROOM IF YOU HAVE ANY OF THE FOLLOWING:      Difficulty breathing              Chills and/or fever over 101 F   Persistent vomiting and/or vomiting blood   Severe abdominal pain   Severe chest pain   Black, tarry stools   Bleeding- more than one  tablespoon   Any other symptom or condition that you feel may need urgent attention  Your doctor recommends these additional instructions:  If any biopsies were taken, your doctors clinic will contact you in 1 to 2   weeks with any results.  - Discharge patient to home.   - Patient has a contact number available for emergencies.  The signs and   symptoms of potential delayed complications were discussed with the   patient.  Return to normal activities tomorrow.  Written discharge   instructions were provided to the patient.   - Resume previous diet.   - Continue present medications.   - Await pathology results.   - Return to GI clinic as previously scheduled.   - Repeat colonoscopy in 2 years to assess disease activity though sooner if   any change in symptoms.   - Consider stool calprotectin yearly.  For questions, problems or results please call your physician - Bubba Spence MD at Work:  [phone].  [facility], EMERGENCY ROOM PHONE NUMBER: [phone]  IF A COMPLICATION OR EMERGENCY SITUATION ARISES AND YOU ARE UNABLE TO REACH   YOUR PHYSICIAN - GO DIRECTLY TO THE EMERGENCY ROOM.  Bubba Spence MD  10/10/2018 8:55:27 AM  This report has been verified and signed electronically.  [employer]

## 2018-10-10 NOTE — ANESTHESIA POSTPROCEDURE EVALUATION
Anesthesia Post Evaluation    Patient: Joss Matute    Procedure(s) Performed: Procedure(s) (LRB):  COLONOSCOPY (N/A)    Final Anesthesia Type: general  Patient location during evaluation: PACU  Patient participation: Yes- Able to Participate  Level of consciousness: awake and alert and oriented  Post-procedure vital signs: reviewed and stable  Pain management: adequate  Airway patency: patent  PONV status at discharge: No PONV  Anesthetic complications: no      Cardiovascular status: blood pressure returned to baseline  Respiratory status: unassisted  Hydration status: euvolemic  Follow-up not needed.        Visit Vitals  /72 (BP Location: Left arm, Patient Position: Lying)   Pulse 75   Temp 36.7 °C (98.1 °F) (Temporal)   Resp 17   SpO2 99%       Pain/Ferny Score: Pain Assessment Performed: Yes (10/10/2018  9:18 AM)  Presence of Pain: denies (10/10/2018  9:18 AM)  Pain Rating Prior to Med Admin: 0 (10/10/2018  9:18 AM)  Ferny Score: 10 (10/10/2018  9:18 AM)

## 2018-10-10 NOTE — H&P
Short Stay Endoscopy History and Physical    PCP - Primary Doctor No  Referring Physician - Kim Coronado, FNP  [address] [place] [address]    Procedure - Colonoscopy  ASA - per anesthesia  Mallampati - per anesthesia  History of Anesthesia problems - no  Family history Anesthesia problems -  no   Plan of anesthesia - General    HPI  32 y.o. male  Reason for procedure:   Follow up Crohn's disease    ROS:  Constitutional: No fevers, chills, No weight loss  CV: No chest pain  Pulm: No cough, No shortness of breath  GI: see HPI    Medical History:  has a past medical history of Crohn's disease.    Surgical History:  has a past surgical history that includes Colonoscopy; Abscess drainage; Anal fistulotomy; Upper gastrointestinal endoscopy; COLONOSCOPY (N/A, 4/7/2017); and PLACEMENT-SETON DRAIN (N/A, 3/15/2017).    Family History: family history includes ALS in his paternal grandfather.. Otherwise no colon cancer, inflammatory bowel disease, or GI malignancies.    Social History:  reports that he quit smoking about 2 years ago. His smoking use included cigarettes. he has never used smokeless tobacco. He reports that he drinks alcohol. He reports that he does not use drugs.    Review of patient's allergies indicates:  No Known Allergies    Medications:   Medications Prior to Admission   Medication Sig Dispense Refill Last Dose    adalimumab (HUMIRA) PnKt injection Inject 0.8 mLs (40 mg total) into the skin every 7 days. 3.2 mL 5 Past Week at Unknown time    folic acid (FOLVITE) 1 MG tablet Take 1 tablet (1 mg total) by mouth once daily. 30 tablet 11 10/9/2018 at Unknown time    imiquimod (ALDARA) 5 % cream Apply to affected area three times weekly 24 packet 5 Taking    methotrexate 2.5 MG Tab Take 5 tablets (12.5 mg total) by mouth every 7 days. 20 tablet 2 More than a month at Unknown time       Physical Exam:    Vital Signs: There were no vitals filed for this visit.    General Appearance: Well  appearing in no acute distress  Abdomen: Soft, non tender, non distended with normal bowel sounds, no masses      Labs:  Lab Results   Component Value Date    WBC 7.49 08/13/2018    HGB 14.0 08/13/2018    HCT 42.0 08/13/2018     (H) 08/13/2018    ALT 32 09/14/2018    AST 17 09/14/2018     08/13/2018    K 4.4 08/13/2018     08/13/2018    CREATININE 0.8 08/13/2018    BUN 12 08/13/2018    CO2 29 08/13/2018       I have explained the risks and benefits of this endoscopic procedure to the patient including but not limited to bleeding, inflammation, infection, perforation, and death.      Bubba Spence MD

## 2018-10-10 NOTE — TRANSFER OF CARE
Anesthesia Transfer of Care Note    Patient: Joss Alvarez Juju    Procedure(s) Performed: Procedure(s) (LRB):  COLONOSCOPY (N/A)    Patient location: PACU    Anesthesia Type: general    Transport from OR: Transported from OR on room air with adequate spontaneous ventilation    Post pain: adequate analgesia    Post assessment: no apparent anesthetic complications and tolerated procedure well    Post vital signs: stable    Level of consciousness: awake, alert and oriented    Nausea/Vomiting: no nausea/vomiting    Complications: none    Transfer of care protocol was followed      Last vitals:   Visit Vitals  /69 (BP Location: Left arm, Patient Position: Lying)   Pulse 90   Temp 36.7 °C (98.1 °F) (Temporal)   Resp 17   SpO2 99%

## 2018-10-10 NOTE — DISCHARGE INSTRUCTIONS
Colonoscopy     A camera attached to a flexible tube with a viewing lens is used to take video pictures.     Colonoscopy is a test to view the inside of your lower digestive tract (colon and rectum). Sometimes it can show the last part of the small intestine (ileum). During the test, small pieces of tissue may be removed for testing. This is called a biopsy. Small growths, such as polyps, may also be removed.   Why is colonoscopy done?  The test is done to help look for colon cancer. And it can help find the source of abdominal pain, bleeding, and changes in bowel habits. It may be needed once a year, depending on factors such as your:  · Age  · Health history  · Family health history  · Symptoms  · Results from any prior colonoscopy  Risks and possible complications  These include:  · Bleeding               · A puncture or tear in the colon   · Risks of anesthesia  · A cancer lesion not being seen  Getting ready   To prepare for the test:  · Talk with your healthcare provider about the risks of the test (see below). Also ask your healthcare provider about alternatives to the test.  · Tell your healthcare provider about any medicines you take. Also tell him or her about any health conditions you may have.  · Make sure your rectum and colon are empty for the test. Follow the diet and bowel prep instructions exactly. If you dont, the test may need to be rescheduled.  · Plan for a friend or family member to drive you home after the test.     Colonoscopy provides an inside view of the entire colon.     You may discuss the results with your doctor right away or at a future visit.  During the test   The test is usually done in the hospital on an outpatient basis. This means you go home the same day. The procedure takes about 30 minutes. During that time:  · You are given relaxing (sedating) medicine through an IV line. You may be drowsy, or fully asleep.  · The healthcare provider will first give you a physical exam to  check for anal and rectal problems.  · Then the anus is lubricated and the scope inserted.  · If you are awake, you may have a feeling similar to needing to have a bowel movement. You may also feel pressure as air is pumped into the colon. Its OK to pass gas during the procedure.  · Biopsy, polyp removal, or other treatments may be done during the test.  After the test   You may have gas right after the test. It can help to try to pass it to help prevent later bloating. Your healthcare provider may discuss the results with you right away. Or you may need to schedule a follow-up visit to talk about the results. After the test, you can go back to your normal eating and other activities. You may be tired from the sedation and need to rest for a few hours.  Date Last Reviewed: 11/1/2016 © 2000-2017 [employer]. [address] [place] [address], [place] [address]. All rights reserved. This information is not intended as a substitute for professional medical care. Always follow your healthcare professional's instructions.

## 2018-10-15 ENCOUNTER — PATIENT MESSAGE (OUTPATIENT)
Dept: GASTROENTEROLOGY | Facility: CLINIC | Age: 33
End: 2018-10-15

## 2018-10-16 ENCOUNTER — PATIENT MESSAGE (OUTPATIENT)
Dept: GASTROENTEROLOGY | Facility: CLINIC | Age: 33
End: 2018-10-16

## 2018-10-17 ENCOUNTER — TELEPHONE (OUTPATIENT)
Dept: ENDOSCOPY | Facility: HOSPITAL | Age: 33
End: 2018-10-17

## 2018-11-07 ENCOUNTER — TELEPHONE (OUTPATIENT)
Dept: PHARMACY | Facility: CLINIC | Age: 33
End: 2018-11-07

## 2018-11-09 NOTE — PROGRESS NOTES
"     [facility]             Inflammatory Bowel Disease Follow-up  Note              TODAY'S VISIT DATE:  11/9/2018    Chief Complaint:   Chief Complaint   Patient presents with    Crohn's Disease     PCP: Primary Doctor No    Previous History:  Joss Matute is a 32 y.o. male with Crohn's disease: ileum, rectal ulcer and perianal fistula/abscess; no active ileitis now (on last colonoscopy 1998 but normal ileum 2007, 1/2017, 4/2017); 4/2017 colonoscopy with rectal ulcer.  He began with symptoms of a perirectal abscess in December 1996 that was initially drained in the ED.  The abscess recurred in December 1997 and was associated with vomiting, weight loss, and abdominal pain.  His initial colonoscopy in 1998 (no actual record just reported in clinic note) showed moderately severe ileitis with biopsies of the right/transverse/left colon and rectum with mild nonspecific acute and chronic reactive changes.  The transverse colon polyp (likely removed) pathology showed it to be "edematous with mild glandular dysplasia."  At that time he was finally diagnosed with Crohn's disease but unsure of the exact location.  Initially he recalls being given prednisone for 4-5 months and pentasa for maintenance.  By 2002, age 17, he stopped all medications and did not have any GI follow up.  He had a hospital admission for a "flare" with dehydration in 2004 and again was not started on any medications.  He was seen in 2007 for RLQ abdominal cramping, increasing fatigue, and increased bowel frequency with blood and mucous.  He had a repeat colonoscopy in 2/2007 that showed patchy area of mucosa in the distal 4 cm of the TI with normal biopsies.  He had recurrent abscess/fistula in 2008 that spontaneously drained and was seen by CRS and did not wish to take any medications.  He reports intermittent abscess/perianal fistula from [id number] with chronic drainage that would cause he to intermittently seek treatment of " I&D in the ER.  In 10/2016 he established care with Dr. Bryson when he had a recurrent perianal fistula who planned to start patient on humira after surgical intervention.  He was seen by Dr. Cason on 3/15/2017 and had an EUA with seton placement.  He most recent colonoscopy on 2017 performed by Dr. LEANDRO Spence showed showed perianal skin tag, seton intact and small rectal ulcer with biopsies consistent with patchy active colitis with features of chronic mucosal injury.  He started Humira on 2017.  Setons were removed by Dr. Cason in 2017.  He started oral MTX in 2017 for immunogenicity prevention.  Trough [facility] humira levels were 2.5 with ABs 57 (low) on 2018 so we planned to increase humira to 40 mg SC weekly and start oral MTX 12.5 mg PO weekly with daily folic acid.  He started humira 40 mg SC every 7 days on 2018.  Repeat trough [facility] Humira levels were 15 with low ABs of 30 on 2018.  He had elevated LFTs on 2018 so oral MTX was discontinued.      Interval History:  - current IBD meds: humira 40 mg SC every 7 days (increased to weekly dosing in 2018), last dose 2018, next dose 2018   - 2 formed Bowel Movements/day with no blood or nocturnal BMs  - minimal clear drainage from fistula, no pain, redness, or swelling  - 2018: LFTs elevated, oral MTX discontinued  - 10/10/2018 colonoscopy: Small perianal skin tag found on perianal exam; rectal fistula; rectum and colon otherwise normal (path-rectum: normal); the examined portion of the ileum was normal (path: normal)  - constitutional/GI symptoms: no fevers/chills, weight loss, dysphagia, GERD, abdominal pain  - extraintestinal manifestations: no eye pain/redness, skin lesions/rashes, liver problems, joint pain/swelling/stiffness, dysuria/hematuria    Prior Pertinent Surgeries:   previous fistula repair Xs 2  3/15/2017: EUA with seton placement    Pertinent Endoscopy/Imagin Colonoscopy: (per clinic note  "path only) showed moderately severe ileitis; right, transverse, left, and rectum showed mild non-specific acute and chronic reactive changes; " transverse polyp path showed it to be edematous with mild glandular dysplasia"  2/22/2007 Colonoscopy: patchy area of mucosa in the ileum was nodular and granular (most distal 4 cm of TI) (path: normal); moderate amount of semisolid stool in the entire colon  5/23/2012 SBFT: normal  1/6/2017 Colonoscopy: fistula in the mauricio-anal area with no active drainage; normal mucosa in the whole colon and TI (no biopsies)  1/18/2017 MRE: no definitive evidence for abnormal mucosal enhancement within the small bowel; suspicious early fistulous tract/fissure within the distal rectum/perianal region tracking along the 9 oclock position rotated into the 6 oclock position and exiting along the anal canal/fissure; no evidence for abnormal enhancing structures within the abdomen/pelvis  4/7/2017 Colonoscopy: perianal skin tag; small rectal ulcer in the rectum (seen on retroflexion) (path: ulcer and adjacent patchy active colitis and features of chronic mucosal injury), remainder colon normal (path: normal); normal TI (path: normal); seton intact     Pertinent Labs:  [facility] IBD testing--cannot read the original  2/6/17 T spot test negative  2/6/17 HBsAg negative  4/2017 CRP 0.8  4/2017 Vit D 15  4/2017 HBsAg neg, HBcAb neg  4/2017 VZV IgG positive  4/2017 TPMT normal  4/2017 vit B12 395  Lab Results   Component Value Date    SEDRATE 2 04/24/2017    CRP 0.8 04/24/2017     Lab Results   Component Value Date    TTGIGA 7 04/24/2017     04/24/2017     No results found for: TSH, FREET4  Lab Results   Component Value Date    [id number] 23 (L) 08/13/2018    KMNMOQNO55 395 04/24/2017     Lab Results   Component Value Date    HEPBSAG Negative 04/24/2017    HEPBCAB Negative 04/24/2017     No results found for: ICB23KEYC  Lab Results   Component Value Date    TSPOTSCREN Negative  01/25/2018 "     Lab Results   Component Value Date    TPTMINTERP Normal  04/24/2017     No results found for: STOOLCULTURE, HITEONFRGQ4A, PRVHFONGRE8F, CDIFFICILEAN, CDIFFTOX, CDIFFICILEBY  No results found for: CALPROTECTIN    Therapeutic Drug Monitoring Labs:  1/8/2018: Trough [facility] ADA Levels 2.5, ABs 57 (low) (humira 40 mg SC every 14 days)  5/17/2018: [facility] Trough Humira Level 15, ABs 30 (low) (humira 40 mg SC every 7 days)    Prior IBD Meds:  Prednisone  Pentasa  Oral MTX 12.5 mg PO weekly with folic acid 1 mg PO daily (started 8/2017, stopped 8/13/2018 due to elevated LFTs)    Current IBD Meds:  Humira 40 mg SC every 7 days (started 5/1/17, increased to humira 40 mg SC every 7 days 2/6/2018)    Vaccinations:  Influenza (inactive): 10/2018, recommended yearly   Pneumococcal PCV 13: 4/24/2017  Pneumococcal PCV 23: 7/25/2017  Tetanus (TdaP): 4/24/2017  HPV (males and females ages 19-27 yo): N/A    Meningococcal (risk factors- complement component deficiency, spleen damage or splenectomy, HIV, traveling to endemic areas, college student residing in residence holder,  recruits): no risk factors   Hepatitis B: 1/25/2018, 2/22/2018, 7/26/2018; Immune    Lab Results   Component Value Date    HEPBSAB Positive (A) 08/13/2018   Hepatitis A (risk factors- traveling to high endemic areas, chronic liver disease, clotting factor disorders, MSM, illicit drug users): 1/25/2018, 2/22/2018, 7/26/2018; Immune    Lab Results   Component Value Date    HEPAIGG Positive (A) 08/13/2018   MMR (live vaccine):        Chickenpox status/Varicella (live vaccine): Immune   Lab Results   Component Value Date    VARICELLAZOS 2.98 (H) 04/24/2017    VARICELLAINT Positive (A) 04/24/2017   Zoster (age >51 yo, live vaccine): Shingrix series recommended     NSAID use/indication:  Advil/Aleve, not frequently    Narcotic use:  No    Alternative/Complementary Meds for IBD:  No    Review of Systems   Constitutional: Negative for chills, fever and weight  "loss.   HENT:        No oral ulcers, dysphagia, oral thrush   Eyes: Negative for blurred vision, pain and redness.   Respiratory: Negative for cough and shortness of breath.    Cardiovascular: Negative for chest pain.   Gastrointestinal: Negative for abdominal pain, heartburn, nausea and vomiting.   Genitourinary: Negative for dysuria and hematuria.   Musculoskeletal: Negative for back pain and joint pain.   Skin: Negative for rash.   Psychiatric/Behavioral: Negative for depression. The patient is not nervous/anxious and does not have insomnia.      All Medical History/Surgical History/Family History/Social History/Allergies have been reviewed and updated in EMR    Review of patient's allergies indicates:  No Known Allergies    Outpatient Medications Marked as Taking for the 11/13/18 encounter (Office Visit) with CHARLY Sevilla   Medication Sig Dispense Refill    adalimumab (HUMIRA) PnKt injection Inject 0.8 mLs (40 mg total) into the skin every 7 days. 3.2 mL 5    folic acid (FOLVITE) 1 MG tablet Take 1 tablet (1 mg total) by mouth once daily. 30 tablet 11    imiquimod (ALDARA) 5 % cream Apply to affected area three times weekly 24 packet 5     Vital Signs:  Vitals:    11/13/18 1440   BP: 116/76   Pulse: 76   Resp: 14   Temp: 98.2 °F (36.8 °C)   Weight: 89.7 kg (197 lb 12 oz)   Height: 6' 1" (1.854 m)   PainSc: 0-No pain     Physical Exam   Constitutional: He is oriented to person, place, and time. He appears well-developed.   HENT:   Mouth/Throat: No oral lesions.   Eyes: Conjunctivae and EOM are normal. Pupils are equal, round, and reactive to light.   Cardiovascular: Normal rate and regular rhythm.   Pulmonary/Chest: Effort normal and breath sounds normal.   Abdominal: Soft. There is no tenderness.   Musculoskeletal:        Right lower leg: He exhibits no swelling.        Left lower leg: He exhibits no swelling.   Neurological: He is alert and oriented to person, place, and time.   Skin: No rash noted. "   Psychiatric: He has a normal mood and affect.   Nursing note and vitals reviewed.    Labs:   Lab Results   Component Value Date    WBC 7.49 08/13/2018    HGB 14.0 08/13/2018    HCT 42.0 08/13/2018    MCV 94 08/13/2018     (H) 08/13/2018     Lab Results   Component Value Date    CREATININE 0.8 08/13/2018    ALBUMIN 3.9 09/14/2018    BILITOT 0.5 09/14/2018    ALKPHOS 40 (L) 09/14/2018    AST 17 09/14/2018    ALT 32 09/14/2018     Lab Results   Component Value Date    TSPOTSCREN Negative  01/25/2018     Assessment/Plan:  Joss Matute is a 32 y.o. male with Crohn's disease (ileum, rectal ulcer, perianal fistula/abscess) who last had perianal abscess drained with setons in 3/2017.  Though there was no active inflammation in the ileum since 1998 colonoscopy.  Colonoscopy on 10/10/2018 showed a normal colon and normal ileum with small perianal skin tag and continued rectal fistula.  We discontinued oral MTX due to elevated LFTs on 8/13/2018 and his LFTs have now normalized.  He continues to do well with no GI complaints.  Since his fistula remains open, we will send him to CRS to discuss fistula repair.  We will repeat trough Humira levels and ABs with his next injection to monitor his levels and ABs now that he is off of oral MTX to see if we need to add Imuran.  We will continue close clinic follow up every 3 months.  If humira proves to be ineffective, he is naive to Imuran, remicade, cimzia, entyvio, and stelara.      # Crohn's disease: ileum, rectal ulcer and perianal fistula/abscess; no active ileitis now (on last colonoscopy 1998 but normal ileum 2007, 1/2017, 4/2017); 4/2017 colonoscopy with rectal ulcer  - continue Humira 40 mg SC every 7 days, next dose due 11/16/2018  - do not restart oral MTX  - repeat trough humira levels and ABs due 11/16/2018 prior to humira dose   - CRS referral placed to discuss fistula repair  - repeat colonoscopy due 10/2020  - drug monitoring labs:  CBC/CMP (today) then every  6 months; T spot negative (1/25/2018, repeat ordered); TPMT normal 4/2017; HBsAg negative (2/2016), HBsAg, HBcAb, HbsAb negative 4/2017    # Ex-smoker  - pt quit fall 2015  - have discussed that CD can be exacerbated with smoking and prevent meds from working and to avoid second hand smoke if possible (pt is )  - reminded to not restart    # IBD specific health maintenance:  Colorectal cancer risk:    Risks factors: personal history of sporadic adenoma in 1998  - Distribution of colonic disease:  proctitis, ileal disease  - Year of symptom onset: 1998  - colonoscopy:  every 5 years    - PCP list given previously to patient to establish care with one of our PCPs--patient still needs to set up PCP, will set up with one closer to home  Opthamologic exam recommended yearly - next due now, again reminded in clinic   Dermatologic exam recommended yearly - next due now, again reminded in clinic     Bone health:  continue OTC Vitamin D3 2000 IU  Risk of osteopenia/osteoporosis:  Risks factors: none  Vitamin D:   Lab Results   Component Value Date    [id number] 23 (L) 08/13/2018     Vaccine counseling:  - No LIVE VACCINES--discussed in clinic today  - Continue ID f/u     Follow up: with JAC Alvarez in 3 months     Total visit time was 40 minutes, more than 50% of which was spent in face-to-face counseling with patient regarding evaluation and management goals and treatment options for Crohn's disease     Kim Coronado, FNP-C  Department of Gastroenterology  Inflammatory Bowel Disease Program

## 2018-11-13 ENCOUNTER — OFFICE VISIT (OUTPATIENT)
Dept: GASTROENTEROLOGY | Facility: CLINIC | Age: 33
End: 2018-11-13
Payer: COMMERCIAL

## 2018-11-13 VITALS
WEIGHT: 197.75 LBS | HEIGHT: 73 IN | TEMPERATURE: 98 F | BODY MASS INDEX: 26.21 KG/M2 | DIASTOLIC BLOOD PRESSURE: 76 MMHG | HEART RATE: 76 BPM | SYSTOLIC BLOOD PRESSURE: 116 MMHG | RESPIRATION RATE: 14 BRPM

## 2018-11-13 DIAGNOSIS — K50.113 CROHN'S DISEASE OF LARGE INTESTINE WITH FISTULA: Primary | ICD-10-CM

## 2018-11-13 DIAGNOSIS — K60.3 ANAL FISTULA: ICD-10-CM

## 2018-11-13 DIAGNOSIS — Z79.899 HIGH RISK MEDICATION USE: ICD-10-CM

## 2018-11-13 DIAGNOSIS — Z87.891 EX-SMOKER: ICD-10-CM

## 2018-11-13 DIAGNOSIS — E55.9 VITAMIN D DEFICIENCY: ICD-10-CM

## 2018-11-13 PROCEDURE — 3008F BODY MASS INDEX DOCD: CPT | Mod: CPTII,S$GLB,, | Performed by: NURSE PRACTITIONER

## 2018-11-13 PROCEDURE — 99999 PR PBB SHADOW E&M-EST. PATIENT-LVL IV: CPT | Mod: PBBFAC,,, | Performed by: NURSE PRACTITIONER

## 2018-11-13 PROCEDURE — 99215 OFFICE O/P EST HI 40 MIN: CPT | Mod: S$GLB,,, | Performed by: NURSE PRACTITIONER

## 2018-11-13 NOTE — PATIENT INSTRUCTIONS
Instructions:  - continue humira 40 mg SC every 7 days, due 11/16/2018  - repeat labs with Trough Humira levels and ABs on 11/16/2018 prior to humira injection   - continue OTC Vitamin D3 2000 IU  - OK to stop folic acid  - do not restart oral MTX  - set up an appointment with Dr. Monterroso to discuss fistula repair  - Avoid all NSAIDs (Advil, Ibuprofen, Motrin, Aspirin, Naprosyn, Aleve)  - Yearly Eye exam due now  - Yearly Skin exam--wear sun block and hats due now   - Use antibiotics with caution  - For Dental Procedures, use antibiotics only if absolutely necessary  - Please make sure you establish care with a PCP  - If you have to take antibiotics for any infection, please take a 2 week course of OTC Florastor 1 capsule twice daily probiotic after completion of the antibiotic course  - Vaccines recommended:  Flu shot yearly  Tetanus every 10 years  Shingrix series  - Follow up with JAC Alvarez in 3 months

## 2018-11-14 ENCOUNTER — PATIENT MESSAGE (OUTPATIENT)
Dept: GASTROENTEROLOGY | Facility: CLINIC | Age: 33
End: 2018-11-14

## 2018-11-15 ENCOUNTER — TELEPHONE (OUTPATIENT)
Dept: GASTROENTEROLOGY | Facility: CLINIC | Age: 33
End: 2018-11-15

## 2018-11-15 DIAGNOSIS — Z79.899 HIGH RISK MEDICATION USE: ICD-10-CM

## 2018-11-15 DIAGNOSIS — K50.113 CROHN'S DISEASE OF LARGE INTESTINE WITH FISTULA: Primary | ICD-10-CM

## 2018-11-15 NOTE — TELEPHONE ENCOUNTER
----- Message from CHARLY Sevilla sent at 11/15/2018 11:48 AM CST -----  T-spot was cancelled because it was drawn after 1 pm.  Needs to be recollected unfortunately.  Has to be done between now and January 2019.    Thanks,

## 2018-11-16 ENCOUNTER — LAB VISIT (OUTPATIENT)
Dept: LAB | Facility: HOSPITAL | Age: 33
End: 2018-11-16
Payer: COMMERCIAL

## 2018-11-16 DIAGNOSIS — K50.113 CROHN'S DISEASE OF LARGE INTESTINE WITH FISTULA: ICD-10-CM

## 2018-11-16 PROCEDURE — 36415 COLL VENOUS BLD VENIPUNCTURE: CPT

## 2018-11-16 PROCEDURE — 80299 QUANTITATIVE ASSAY DRUG: CPT

## 2018-11-23 NOTE — TELEPHONE ENCOUNTER
Pt is scheduled for T-spot 11/29/2018 @ 1200.    Appt reminder mailed to pt, mailing address confirmed.

## 2018-11-26 ENCOUNTER — PATIENT MESSAGE (OUTPATIENT)
Dept: GASTROENTEROLOGY | Facility: CLINIC | Age: 33
End: 2018-11-26

## 2018-11-26 ENCOUNTER — TELEPHONE (OUTPATIENT)
Dept: GASTROENTEROLOGY | Facility: CLINIC | Age: 33
End: 2018-11-26

## 2018-11-26 LAB — ADALIMUMAB CONCENTRATION & ANTI-ADALIMUMAB ANTIBODY: NORMAL

## 2018-11-26 NOTE — TELEPHONE ENCOUNTER
Called and spoke with pt  I explained message below  He expressed understanding and appreciated the call

## 2018-11-26 NOTE — TELEPHONE ENCOUNTER
----- Message from CHARLY Sevilla sent at 11/26/2018  8:11 AM CST -----  Please let Mr. Don know that he no longer needs the folic acid RX since he is no longer taking oral MTX.    Thanks,

## 2018-11-27 ENCOUNTER — TELEPHONE (OUTPATIENT)
Dept: GASTROENTEROLOGY | Facility: CLINIC | Age: 33
End: 2018-11-27

## 2018-11-29 ENCOUNTER — LAB VISIT (OUTPATIENT)
Dept: LAB | Facility: HOSPITAL | Age: 33
End: 2018-11-29
Payer: COMMERCIAL

## 2018-11-29 DIAGNOSIS — Z79.899 HIGH RISK MEDICATION USE: ICD-10-CM

## 2018-11-29 DIAGNOSIS — K50.113 CROHN'S DISEASE OF LARGE INTESTINE WITH FISTULA: ICD-10-CM

## 2018-11-29 PROCEDURE — 86481 TB AG RESPONSE T-CELL SUSP: CPT

## 2018-11-29 PROCEDURE — 36415 COLL VENOUS BLD VENIPUNCTURE: CPT

## 2018-12-03 LAB — T-SPOT TB SCREENING TEST: NORMAL

## 2018-12-13 NOTE — PROGRESS NOTES
Called patient and left voicemail regarding forms being completed for personal care.     Forms faxed and confirmed.    Resident paged for orders

## 2018-12-26 DIAGNOSIS — K50.813 CROHN'S DISEASE OF BOTH SMALL AND LARGE INTESTINE WITH FISTULA: ICD-10-CM

## 2018-12-27 ENCOUNTER — TELEPHONE (OUTPATIENT)
Dept: PHARMACY | Facility: CLINIC | Age: 33
End: 2018-12-27

## 2018-12-27 NOTE — TELEPHONE ENCOUNTER
Humira:   Patient stated he is not ready for refill at this time. Still has 3 pens remaining on hand. Advised we will reach out again in 2 weeks, but should his count be off to give us a call back before then, he acknowledged.

## 2019-01-08 NOTE — TELEPHONE ENCOUNTER
Humira refill. $6,200 (004) - One time [facility]. Shipping out 1/9/19. Address confirmed. Next dose due: 1/11/19. No new questions or concerns. No new supplies needed.

## 2019-01-18 ENCOUNTER — TELEPHONE (OUTPATIENT)
Dept: PHARMACY | Facility: CLINIC | Age: 34
End: 2019-01-18

## 2019-01-18 NOTE — TELEPHONE ENCOUNTER
DOCUMENTATION ONLY:   Humira renewal prior authorization approved x 1 year.   Dates: 1/18/19 through 1/18/20  Case ID: [id number]

## 2019-02-01 ENCOUNTER — TELEPHONE (OUTPATIENT)
Dept: PHARMACY | Facility: CLINIC | Age: 34
End: 2019-02-01

## 2019-02-08 NOTE — PROGRESS NOTES
"     [facility]             Inflammatory Bowel Disease Follow-up  Note              TODAY'S VISIT DATE:  2/8/2019    Chief Complaint:   Chief Complaint   Patient presents with    Crohn's Disease     PCP: Primary Doctor No    Previous History:  Joss Matute is a 33 y.o. male with Crohn's disease: ileum, rectal ulcer and perianal fistula/abscess; no active ileitis now (on last colonoscopy 1998 but normal ileum 2007, 1/2017, 4/2017); 4/2017 colonoscopy with rectal ulcer.  He began with symptoms of a perirectal abscess in December 1996 that was initially drained in the ED.  The abscess recurred in December 1997 and was associated with vomiting, weight loss, and abdominal pain.  His initial colonoscopy in 1998 (no actual record just reported in clinic note) showed moderately severe ileitis with biopsies of the right/transverse/left colon and rectum with mild nonspecific acute and chronic reactive changes.  The transverse colon polyp (likely removed) pathology showed it to be "edematous with mild glandular dysplasia."  At that time he was finally diagnosed with Crohn's disease but unsure of the exact location.  Initially he recalls being given prednisone for 4-5 months and pentasa for maintenance.  By 2002, age 17, he stopped all medications and did not have any GI follow up.  He had a hospital admission for a "flare" with dehydration in 2004 and again was not started on any medications.  He was seen in 2007 for RLQ abdominal cramping, increasing fatigue, and increased bowel frequency with blood and mucous.  He had a repeat colonoscopy in 2/2007 that showed patchy area of mucosa in the distal 4 cm of the TI with normal biopsies.  He had recurrent abscess/fistula in 2008 that spontaneously drained and was seen by CRS and did not wish to take any medications.  He reports intermittent abscess/perianal fistula from 1991-4260 with chronic drainage that would cause he to intermittently seek treatment of " I&D in the ER.  In 10/2016 he established care with Dr. Bryson when he had a recurrent perianal fistula who planned to start patient on humira after surgical intervention.  He was seen by Dr. Cason on 3/15/2017 and had an EUA with seton placement.  He most recent colonoscopy on 2017 performed by Dr. LEANDRO Spence showed showed perianal skin tag, seton intact and small rectal ulcer with biopsies consistent with patchy active colitis with features of chronic mucosal injury.  He started Humira on 2017.  Setons were removed by Dr. Cason in 2017.  He started oral MTX in 2017 for immunogenicity prevention.  Trough [facility] humira levels were 2.5 with ABs 57 (low) on 2018 so we planned to increase humira to 40 mg SC weekly and start oral MTX 12.5 mg PO weekly with daily folic acid.  He started humira 40 mg SC every 7 days on 2018.  Repeat trough [facility] Humira levels were 15 with low ABs of 30 on 2018.  He had elevated LFTs on 2018 so oral MTX was discontinued.  Colonoscopy on 10/10/2018 showed a small perianal skin tar, rectal fistula, other normal rectum, colon, and ileum with normal biopsies.        Interval History:  - current IBD meds: humira 40 mg SC every 7 days (increased to weekly dosing in 2018), last dose 2019, next dose 2/15/2019   - 2-3 soft-formed Bowel Movements/day with no blood or nocturnal BMs  - 2018: Trough [facility] Humira Level 20, ABs 26 (low) (humira 40 mg SC every 7 days)  - minimal clear drainage from fistula, no pain, redness, or swelling  - NSAID use/indication: Advil/Aleve, not frequently  - Narcotic use: No  - Alternative/Complementary Meds for IBD: No    Prior Pertinent Surgeries:  previous fistula repair Xs 2  3/15/2017: EUA with seton placement    Pertinent Endoscopy/Imagin Colonoscopy: (per clinic note path only) showed moderately severe ileitis; right, transverse, left, and rectum showed mild non-specific acute and chronic reactive changes;  "" transverse polyp path showed it to be edematous with mild glandular dysplasia"  2/22/2007 Colonoscopy: patchy area of mucosa in the ileum was nodular and granular (most distal 4 cm of TI) (path: normal); moderate amount of semisolid stool in the entire colon  5/23/2012 SBFT: normal  1/6/2017 Colonoscopy: fistula in the mauricio-anal area with no active drainage; normal mucosa in the whole colon and TI (no biopsies)  1/18/2017 MRE: no definitive evidence for abnormal mucosal enhancement within the small bowel; suspicious early fistulous tract/fissure within the distal rectum/perianal region tracking along the 9 oclock position rotated into the 6 oclock position and exiting along the anal canal/fissure; no evidence for abnormal enhancing structures within the abdomen/pelvis  4/7/2017 Colonoscopy: perianal skin tag; small rectal ulcer in the rectum (seen on retroflexion) (path: ulcer and adjacent patchy active colitis and features of chronic mucosal injury), remainder colon normal (path: normal); normal TI (path: normal); seton intact     Pertinent Labs:  Prom[facility]right[facility]ALK IBD testing--cannot read the original  2/6/17 T spot test negative  2/6/17 HBsAg negative  4/2017 CRP 0.8  4/2017 Vit D 15  4/2017 HBsAg neg, HBcAb neg  4/2017 VZV IgG positive  4/2017 TPMT normal  4/2017 vit B12 395  Lab Results   Component Value Date    SEDRATE 2 04/24/2017    CRP 0.8 04/24/2017     Lab Results   Component Value Date    TTGIGA 7 04/24/2017     04/24/2017     No results found for: TSH, FREET4  Lab Results   Component Value Date    VSVMDKCM19ZN 23 (L) 08/13/2018    JADWBCGY05 395 04/24/2017     Lab Results   Component Value Date    HEPBSAG Negative 04/24/2017    HEPBCAB Negative 04/24/2017     No results found for: ZWM76IYBQ  Lab Results   Component Value Date    TSPOTSCREN Negative  11/29/2018     Lab Results   Component Value Date    TPTMINTERP Normal  04/24/2017     No results found for: STOOLCULTURE, DTJFRIEEWY9N, XLFQHYDZJX0C, " CDIFFICILEAN, CDIFFTOX, CDIFFICILEBY  No results found for: CALPROTECTIN    Therapeutic Drug Monitoring Labs:  1/8/2018: Trough [facility] ADA Levels 2.5, ABs 57 (low) (humira 40 mg SC every 14 days)  5/17/2018: [facility] Trough Humira Level 15, ABs 30 (low) (humira 40 mg SC every 7 days)  11/16/2018: Trough [facility] Humira Level 20, ABs 26 (low) (humira 40 mg SC every 7 days)    Prior IBD Meds:  Prednisone  Pentasa  Oral MTX 12.5 mg PO weekly with folic acid 1 mg PO daily (started 8/2017, stopped 8/13/2018 due to elevated LFTs)    Current IBD Meds:  Humira 40 mg SC every 7 days (started 5/1/17, increased to humira 40 mg SC every 7 days 2/6/2018)    Vaccinations:  Influenza (inactive): 10/2018  Pneumococcal PCV 13: 4/24/2017  Pneumococcal PCV 23: 7/25/2017  Tetanus (TdaP): 4/24/2017  HPV (males and females ages 19-27 yo): N/A     Meningococcal: no risk factors    Hepatitis B: 1/25/2018, 2/22/2018, 7/26/2018; Immune  Lab Results   Component Value Date    HEPBSAB Positive (A) 08/13/2018   Hepatitis A: 1/25/2018, 2/22/2018, 7/26/2018; Immune     Lab Results   Component Value Date    HEPAIGG Positive (A) 08/13/2018   MMR (live vaccine): up to date per patient         Chickenpox status/Varicella (live vaccine): Immune   Lab Results   Component Value Date    VARICELLAZOS 2.98 (H) 04/24/2017    VARICELLAINT Positive (A) 04/24/2017   Zoster (age >51 yo, live vaccine): Shingrix series recommended     Review of Systems   Constitutional: Negative for chills, fever and weight loss.   HENT:        No oral ulcers, dysphagia, oral thrush   Eyes: Negative for blurred vision, pain and redness.   Respiratory: Negative for cough and shortness of breath.    Cardiovascular: Negative for chest pain.   Gastrointestinal: Negative for abdominal pain, heartburn, nausea and vomiting.   Genitourinary: Negative for dysuria and hematuria.   Musculoskeletal: Negative for back pain and joint pain.   Skin: Negative for rash.   Psychiatric/Behavioral:  "Negative for depression. The patient is not nervous/anxious and does not have insomnia.      All Medical History/Surgical History/Family History/Social History/Allergies have been reviewed and updated in EMR    Review of patient's allergies indicates:  No Known Allergies    Outpatient Medications Marked as Taking for the 2/13/19 encounter (Office Visit) with CHARLY Sevilla   Medication Sig Dispense Refill    adalimumab (HUMIRA) PnKt injection Inject 0.8 mLs (40 mg total) into the skin every 7 days. 4 pen 5     Vital Signs:  Vitals:    02/13/19 1258   BP: 126/77   Pulse: 86   Resp: 14   Temp: 98.2 °F (36.8 °C)   Weight: 92.3 kg (203 lb 7.8 oz)   Height: 6' 1" (1.854 m)   PainSc: 0-No pain     Physical Exam   Constitutional: He is oriented to person, place, and time. He appears well-developed.   HENT:   Mouth/Throat: No oral lesions.   Eyes: Conjunctivae and EOM are normal. Pupils are equal, round, and reactive to light.   Cardiovascular: Normal rate and regular rhythm.   Pulmonary/Chest: Effort normal and breath sounds normal.   Abdominal: Soft. There is no tenderness.   Musculoskeletal:        Right lower leg: He exhibits no swelling.        Left lower leg: He exhibits no swelling.   Neurological: He is alert and oriented to person, place, and time.   Skin: No rash noted.   Psychiatric: He has a normal mood and affect.   Nursing note and vitals reviewed.      Labs:   Lab Results   Component Value Date    WBC 6.93 11/13/2018    HGB 14.5 11/13/2018    HCT 44.2 11/13/2018    MCV 92 11/13/2018     (H) 11/13/2018     Lab Results   Component Value Date    CREATININE 0.8 11/13/2018    ALBUMIN 4.0 11/13/2018    BILITOT 0.9 11/13/2018    ALKPHOS 50 (L) 11/13/2018    AST 19 11/13/2018    ALT 43 11/13/2018     Lab Results   Component Value Date    TSPOTSCREN Negative  11/29/2018     Assessment/Plan:  Joss Matute is a 33 y.o. male with Crohn's disease (ileum, rectal ulcer, perianal fistula/abscess) who last had " perianal abscess drained with setons in 3/2017.  Though there was no active inflammation in the ileum since 1998 colonoscopy.  He had trough Humira levels and ABs on 11/16/2018 which showed a level of 20 with low ABs at 26 on humira 40 mg SC every 7 days.  Since he has a good level we will not add a thiopurine to his treatment regimen and continue to remain off of oral MTX due to it causing an elevation LFTs.  He continues to do well though his fistula remains open so we will send him over to CRS to discuss fistula repair options.  We will repeat trough humira levels and ABs annually which will be due in 11/2019 and will plan for a colonoscopy in 10/2020.  We discussed health maintenance and vaccinations as well as him needing a PCP.  If humira proves to be ineffective, he is naive to Imuran, remicade, cimzia, entyvio, and stelara.      # Crohn's disease: ileum, rectal ulcer and perianal fistula/abscess; no active ileitis now (on last colonoscopy 1998 but normal ileum 2007, 1/2017, 4/2017); 4/2017 colonoscopy with rectal ulcer  - continue Humira 40 mg SC every 7 days, next dose due 2/15/2019  - do not restart oral MTX  - repeat trough humira levels and ABs yearly, due 11/2019  - CRS referral placed to discuss fistula repair, will schedule appointment   - repeat colonoscopy due 10/2020  - drug monitoring labs:  CBC/CMP every 6 months due 5/2019; 11/29/2018 T spot negative; TPMT normal 4/2017; HBsAg negative (2/2016), HBsAg, HBcAb, HbsAb negative 4/2017    # Ex-smoker  - pt quit fall 2015  - have discussed that CD can be exacerbated with smoking and prevent meds from working and to avoid second hand smoke if possible (pt is )  - reminded to not restart    # IBD specific health maintenance:  Colorectal cancer risk:  Risks factors: personal history of sporadic adenoma in 1998  - Distribution of colonic disease:  proctitis, ileal disease  - Year of symptom onset: 1998  - colonoscopy:  every 5 years    - PCP list  given previously to patient to establish care with one of our PCPs--patient still needs to set up PCP, will set up with one closer to home  Opthamologic exam recommended yearly - next due now, again reminded in clinic  Dermatologic exam recommended yearly - next due now, again reminded in clinic    Bone health:  continue OTC Vitamin D3 2000 IU  Risk of osteopenia/osteoporosis:  Risks factors: none  Vitamin D: will repeat with next labs  Lab Results   Component Value Date    [id number] 23 (L) 08/13/2018     Vaccine counseling:  - No LIVE VACCINES--discussed in clinic today    Follow up: with JAC Alvarez in 5/2019    Total visit time was 25 minutes, more than 50% of which was spent in face-to-face counseling with patient regarding evaluation and management goals and treatment options for Crohn's disease     CHARLY Sevilla-C  Department of Gastroenterology  Inflammatory Bowel Disease Program

## 2019-02-13 ENCOUNTER — OFFICE VISIT (OUTPATIENT)
Dept: GASTROENTEROLOGY | Facility: CLINIC | Age: 34
End: 2019-02-13
Payer: COMMERCIAL

## 2019-02-13 VITALS
RESPIRATION RATE: 14 BRPM | DIASTOLIC BLOOD PRESSURE: 77 MMHG | BODY MASS INDEX: 26.97 KG/M2 | TEMPERATURE: 98 F | SYSTOLIC BLOOD PRESSURE: 126 MMHG | WEIGHT: 203.5 LBS | HEART RATE: 86 BPM | HEIGHT: 73 IN

## 2019-02-13 DIAGNOSIS — Z87.891 EX-SMOKER: ICD-10-CM

## 2019-02-13 DIAGNOSIS — K50.113 CROHN'S DISEASE OF LARGE INTESTINE WITH FISTULA: Primary | ICD-10-CM

## 2019-02-13 DIAGNOSIS — Z79.899 HIGH RISK MEDICATION USE: ICD-10-CM

## 2019-02-13 DIAGNOSIS — E55.9 VITAMIN D DEFICIENCY: ICD-10-CM

## 2019-02-13 PROCEDURE — 3008F BODY MASS INDEX DOCD: CPT | Mod: CPTII,S$GLB,, | Performed by: NURSE PRACTITIONER

## 2019-02-13 PROCEDURE — 99214 OFFICE O/P EST MOD 30 MIN: CPT | Mod: S$GLB,,, | Performed by: NURSE PRACTITIONER

## 2019-02-13 PROCEDURE — 99214 PR OFFICE/OUTPT VISIT, EST, LEVL IV, 30-39 MIN: ICD-10-PCS | Mod: S$GLB,,, | Performed by: NURSE PRACTITIONER

## 2019-02-13 PROCEDURE — 99999 PR PBB SHADOW E&M-EST. PATIENT-LVL IV: CPT | Mod: PBBFAC,,, | Performed by: NURSE PRACTITIONER

## 2019-02-13 PROCEDURE — 3008F PR BODY MASS INDEX (BMI) DOCUMENTED: ICD-10-PCS | Mod: CPTII,S$GLB,, | Performed by: NURSE PRACTITIONER

## 2019-02-13 PROCEDURE — 99999 PR PBB SHADOW E&M-EST. PATIENT-LVL IV: ICD-10-PCS | Mod: PBBFAC,,, | Performed by: NURSE PRACTITIONER

## 2019-03-13 ENCOUNTER — OFFICE VISIT (OUTPATIENT)
Dept: SURGERY | Facility: CLINIC | Age: 34
End: 2019-03-13
Payer: COMMERCIAL

## 2019-03-13 VITALS
WEIGHT: 201.75 LBS | SYSTOLIC BLOOD PRESSURE: 142 MMHG | HEART RATE: 80 BPM | HEIGHT: 73 IN | DIASTOLIC BLOOD PRESSURE: 79 MMHG | BODY MASS INDEX: 26.74 KG/M2

## 2019-03-13 DIAGNOSIS — K50.118 CROHN'S DISEASE OF PERIANAL REGION WITH OTHER COMPLICATION: ICD-10-CM

## 2019-03-13 DIAGNOSIS — L29.0 PRURITUS ANI: Primary | ICD-10-CM

## 2019-03-13 DIAGNOSIS — K60.3 ANAL FISTULA: Primary | ICD-10-CM

## 2019-03-13 PROCEDURE — 99213 OFFICE O/P EST LOW 20 MIN: CPT | Mod: S$GLB,,, | Performed by: COLON & RECTAL SURGERY

## 2019-03-13 PROCEDURE — 3008F PR BODY MASS INDEX (BMI) DOCUMENTED: ICD-10-PCS | Mod: CPTII,S$GLB,, | Performed by: COLON & RECTAL SURGERY

## 2019-03-13 PROCEDURE — 3008F BODY MASS INDEX DOCD: CPT | Mod: CPTII,S$GLB,, | Performed by: COLON & RECTAL SURGERY

## 2019-03-13 PROCEDURE — 99999 PR PBB SHADOW E&M-EST. PATIENT-LVL III: ICD-10-PCS | Mod: PBBFAC,,, | Performed by: COLON & RECTAL SURGERY

## 2019-03-13 PROCEDURE — 99213 PR OFFICE/OUTPT VISIT, EST, LEVL III, 20-29 MIN: ICD-10-PCS | Mod: S$GLB,,, | Performed by: COLON & RECTAL SURGERY

## 2019-03-13 PROCEDURE — 99999 PR PBB SHADOW E&M-EST. PATIENT-LVL III: CPT | Mod: PBBFAC,,, | Performed by: COLON & RECTAL SURGERY

## 2019-03-13 NOTE — PROGRESS NOTES
HPI:  Joss Matute is a 33 y.o. 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  He states that his intestinal disease is fairly well controlled.  He has 2-4 bowel movements per day.  They can be loose and he occasionally takes Imodium to slow things down.  He denies any severe abdominal pain.  He denies any rectal bleeding of any substance.    He had exam under anesthesia March 15, 2017 with insertion of a non cutting seton for management of a transsphincteric fistula in ANO.  The seton was removed July 25, 2017 but unfortunately the fistula continues to cause discomfort and drainage. He denies severe pain        Past Medical History:   Diagnosis Date    Crohn's disease of large intestine 10/10/2018        Past Surgical History:   Procedure Laterality Date    ABSCESS DRAINAGE      ANAL FISTULOTOMY      COLONOSCOPY      COLONOSCOPY N/A 10/10/2018    Performed by [facility]ence MD at [facility] ENDO (4TH FLR)    COLONOSCOPY N/A 4/7/2017    Performed by [facility] at [facility] ENDO (4TH FLR)    PLACEMENT-SETON DRAIN N/A 3/15/2017    Performed by Javier Cason MD at [facility] OR 2ND FLR    UPPER GASTROINTESTINAL ENDOSCOPY         Review of patient's allergies indicates:  No Known Allergies    Family History   Problem Relation Age of Onset    ALS Paternal Grandfather     Celiac disease Neg Hx     Cirrhosis Neg Hx     Colon cancer Neg Hx     Colon polyps Neg Hx     Crohn's disease Neg Hx     Cystic fibrosis Neg Hx     Esophageal cancer Neg Hx     Hemochromatosis Neg Hx     Inflammatory bowel disease Neg Hx     Irritable bowel syndrome Neg Hx     Liver cancer Neg Hx     Liver disease Neg Hx     Rectal cancer Neg Hx     Stomach cancer Neg Hx     Ulcerative colitis Neg Hx     Roby's disease Neg Hx     Lupus Neg Hx     Multiple sclerosis Neg Hx     Tuberculosis Neg Hx     Rheum arthritis Neg Hx     Scleroderma Neg Hx     Lymphoma Neg Hx        Social History  "    Socioeconomic History    Marital status:      Spouse name: None    Number of children: None    Years of education: None    Highest education level: None   Social Needs    Financial resource strain: None    Food insecurity - worry: None    Food insecurity - inability: None    Transportation needs - medical: None    Transportation needs - non-medical: None   Occupational History    None   Tobacco Use    Smoking status: Former Smoker     Types: Cigarettes     Last attempt to quit: 10/24/2015     Years since quitting: 3.3    Smokeless tobacco: Never Used   Substance and Sexual Activity    Alcohol use: Yes     Comment: 1-2 weekly    Drug use: No    Sexual activity: Yes     Partners: Female     Comment:     Other Topics Concern    None   Social History Narrative     at [employer] [facility] [employer]        ROS:  GENERAL: No fever, chills, fatigability or weight loss.  Integument: No rashes, redness, icterus  CHEST: Denies DAY, cyanosis, wheezing, cough and sputum production.  CARDIOVASCULAR: Denies chest pain, PND, orthopnea or reduced exercise tolerance.  GI: Denies abd pain, dysphagia, nausea, vomiting, no hematemesis   : Denies burning on urination, no hematuria, no bacteriuria  MSK: No deformities, swelling, joint pain swelling  Neurologic: No HAs, seizures, weakness, paresthesias, gait problems    PE:  General appearance nontoxic  BP (!) 142/79 (BP Location: Right arm, Patient Position: Sitting, BP Method: Large (Automatic))   Pulse 80   Ht 6' 1" (1.854 m)   Wt 91.5 kg (201 lb 11.5 oz)   BMI 26.61 kg/m²   Sclera/ Skin anicteric  AT NC EOMI  Neck supple trachea midline   Chest symmetric, nl excursion, no retractions, breathing comfortably  EXT - no CCE  Neuro:  Mood/ affect nl, alert and oriented x 3, moves all ext's, gait nl    Rectal  Inspection       Mild perianal skin excoriation, pruritic changes  No evidence of external openings.  No masses or induration appreciated.  No " fluctuance.  CAMACHO normal tone, no masses      Assessment:  No obvious evidence of persistent fistula in ANO on today's exam.  Crohn's colitis, well controlled    Plan:  Discussed the above findings with the patient.  I have recommended that we check an MRI of the perianal area to look for an occult fistula  Return to the office in 2 weeks for follow-up visit

## 2019-03-20 ENCOUNTER — HOSPITAL ENCOUNTER (OUTPATIENT)
Dept: RADIOLOGY | Facility: HOSPITAL | Age: 34
Discharge: HOME | End: 2019-03-20
Attending: COLON & RECTAL SURGERY
Payer: COMMERCIAL

## 2019-03-20 DIAGNOSIS — K60.3 ANAL FISTULA: ICD-10-CM

## 2019-03-20 PROCEDURE — A9585 GADOBUTROL INJECTION: HCPCS | Performed by: COLON & RECTAL SURGERY

## 2019-03-20 PROCEDURE — 25500020 PHARM REV CODE 255: Performed by: COLON & RECTAL SURGERY

## 2019-03-20 PROCEDURE — 72197 MRI PELVIS W/O & W/DYE: CPT | Mod: TC

## 2019-03-20 PROCEDURE — 72197 MRI PELVIS W/O & W/DYE: CPT | Mod: 26,,, | Performed by: RADIOLOGY

## 2019-03-20 PROCEDURE — 72197 MRI PELVIS W WO CONTRAST: ICD-10-PCS | Mod: 26,,, | Performed by: RADIOLOGY

## 2019-03-20 RX ORDER — GADOBUTROL 604.72 MG/ML
10 INJECTION INTRAVENOUS
Status: COMPLETED | OUTPATIENT
Start: 2019-03-20 | End: 2019-03-20

## 2019-03-20 RX ADMIN — GADOBUTROL 10 ML: 604.72 INJECTION INTRAVENOUS at 05:03

## 2019-03-22 ENCOUNTER — TELEPHONE (OUTPATIENT)
Dept: PHARMACY | Facility: CLINIC | Age: 34
End: 2019-03-22

## 2019-04-25 ENCOUNTER — TELEPHONE (OUTPATIENT)
Dept: PHARMACY | Facility: CLINIC | Age: 34
End: 2019-04-25

## 2019-04-25 NOTE — TELEPHONE ENCOUNTER
Patient confirmed shipping of Humira on  to arrive . Address and  verified. $0 copay in 004. Patient is in need of a new sharps container. Patient was not home, but he thinks he has 1 to 2 doses on hand. Next dose due . Patient reported no missed doses. Patient did not start any new medications. Patient had no further questions or concerns.

## 2019-05-15 ENCOUNTER — PATIENT MESSAGE (OUTPATIENT)
Dept: GASTROENTEROLOGY | Facility: CLINIC | Age: 34
End: 2019-05-15

## 2019-05-15 ENCOUNTER — OFFICE VISIT (OUTPATIENT)
Dept: GASTROENTEROLOGY | Facility: CLINIC | Age: 34
End: 2019-05-15
Payer: COMMERCIAL

## 2019-05-15 ENCOUNTER — LAB VISIT (OUTPATIENT)
Dept: LAB | Facility: HOSPITAL | Age: 34
End: 2019-05-15
Payer: COMMERCIAL

## 2019-05-15 VITALS
BODY MASS INDEX: 26.42 KG/M2 | SYSTOLIC BLOOD PRESSURE: 117 MMHG | HEIGHT: 73 IN | HEART RATE: 88 BPM | OXYGEN SATURATION: 97 % | RESPIRATION RATE: 16 BRPM | TEMPERATURE: 99 F | DIASTOLIC BLOOD PRESSURE: 69 MMHG | WEIGHT: 199.31 LBS

## 2019-05-15 DIAGNOSIS — E55.9 VITAMIN D DEFICIENCY: ICD-10-CM

## 2019-05-15 DIAGNOSIS — Z79.899 HIGH RISK MEDICATION USE: ICD-10-CM

## 2019-05-15 DIAGNOSIS — K50.113 CROHN'S DISEASE OF LARGE INTESTINE WITH FISTULA: ICD-10-CM

## 2019-05-15 DIAGNOSIS — K50.113 CROHN'S DISEASE OF LARGE INTESTINE WITH FISTULA: Primary | ICD-10-CM

## 2019-05-15 DIAGNOSIS — Z87.891 EX-SMOKER: ICD-10-CM

## 2019-05-15 LAB
25(OH)D3+25(OH)D2 SERPL-MCNC: 26 NG/ML (ref 30–96)
ALBUMIN SERPL BCP-MCNC: 4.1 G/DL (ref 3.5–5.2)
ALP SERPL-CCNC: 49 U/L (ref 55–135)
ALT SERPL W/O P-5'-P-CCNC: 20 U/L (ref 10–44)
ANION GAP SERPL CALC-SCNC: 6 MMOL/L (ref 8–16)
AST SERPL-CCNC: 19 U/L (ref 10–40)
BASOPHILS # BLD AUTO: 0.11 K/UL (ref 0–0.2)
BASOPHILS NFR BLD: 1.7 % (ref 0–1.9)
BILIRUB SERPL-MCNC: 0.4 MG/DL (ref 0.1–1)
BUN SERPL-MCNC: 12 MG/DL (ref 6–20)
CALCIUM SERPL-MCNC: 10.2 MG/DL (ref 8.7–10.5)
CHLORIDE SERPL-SCNC: 104 MMOL/L (ref 95–110)
CO2 SERPL-SCNC: 31 MMOL/L (ref 23–29)
CREAT SERPL-MCNC: 0.9 MG/DL (ref 0.5–1.4)
DIFFERENTIAL METHOD: ABNORMAL
EOSINOPHIL # BLD AUTO: 0.8 K/UL (ref 0–0.5)
EOSINOPHIL NFR BLD: 12 % (ref 0–8)
ERYTHROCYTE [DISTWIDTH] IN BLOOD BY AUTOMATED COUNT: 12.3 % (ref 11.5–14.5)
EST. GFR  (AFRICAN AMERICAN): >60 ML/MIN/1.73 M^2
EST. GFR  (NON AFRICAN AMERICAN): >60 ML/MIN/1.73 M^2
GLUCOSE SERPL-MCNC: 83 MG/DL (ref 70–110)
HCT VFR BLD AUTO: 46.4 % (ref 40–54)
HGB BLD-MCNC: 15.2 G/DL (ref 14–18)
IMM GRANULOCYTES # BLD AUTO: 0.01 K/UL (ref 0–0.04)
IMM GRANULOCYTES NFR BLD AUTO: 0.2 % (ref 0–0.5)
LYMPHOCYTES # BLD AUTO: 3 K/UL (ref 1–4.8)
LYMPHOCYTES NFR BLD: 46.8 % (ref 18–48)
MCH RBC QN AUTO: 30 PG (ref 27–31)
MCHC RBC AUTO-ENTMCNC: 32.8 G/DL (ref 32–36)
MCV RBC AUTO: 92 FL (ref 82–98)
MONOCYTES # BLD AUTO: 0.7 K/UL (ref 0.3–1)
MONOCYTES NFR BLD: 10.9 % (ref 4–15)
NEUTROPHILS # BLD AUTO: 1.8 K/UL (ref 1.8–7.7)
NEUTROPHILS NFR BLD: 28.4 % (ref 38–73)
NRBC BLD-RTO: 0 /100 WBC
PLATELET # BLD AUTO: 450 K/UL (ref 150–350)
PMV BLD AUTO: 9.8 FL (ref 9.2–12.9)
POTASSIUM SERPL-SCNC: 4.7 MMOL/L (ref 3.5–5.1)
PROT SERPL-MCNC: 8 G/DL (ref 6–8.4)
RBC # BLD AUTO: 5.06 M/UL (ref 4.6–6.2)
SODIUM SERPL-SCNC: 141 MMOL/L (ref 136–145)
VIT B12 SERPL-MCNC: 296 PG/ML (ref 210–950)
WBC # BLD AUTO: 6.33 K/UL (ref 3.9–12.7)

## 2019-05-15 PROCEDURE — 99999 PR PBB SHADOW E&M-EST. PATIENT-LVL IV: ICD-10-PCS | Mod: PBBFAC,,, | Performed by: NURSE PRACTITIONER

## 2019-05-15 PROCEDURE — 82306 VITAMIN D 25 HYDROXY: CPT

## 2019-05-15 PROCEDURE — 36415 COLL VENOUS BLD VENIPUNCTURE: CPT

## 2019-05-15 PROCEDURE — 99214 PR OFFICE/OUTPT VISIT, EST, LEVL IV, 30-39 MIN: ICD-10-PCS | Mod: S$GLB,,, | Performed by: NURSE PRACTITIONER

## 2019-05-15 PROCEDURE — 87340 HEPATITIS B SURFACE AG IA: CPT

## 2019-05-15 PROCEDURE — 86704 HEP B CORE ANTIBODY TOTAL: CPT

## 2019-05-15 PROCEDURE — 3008F PR BODY MASS INDEX (BMI) DOCUMENTED: ICD-10-PCS | Mod: CPTII,S$GLB,, | Performed by: NURSE PRACTITIONER

## 2019-05-15 PROCEDURE — 80053 COMPREHEN METABOLIC PANEL: CPT

## 2019-05-15 PROCEDURE — 3008F BODY MASS INDEX DOCD: CPT | Mod: CPTII,S$GLB,, | Performed by: NURSE PRACTITIONER

## 2019-05-15 PROCEDURE — 82607 VITAMIN B-12: CPT

## 2019-05-15 PROCEDURE — 85025 COMPLETE CBC W/AUTO DIFF WBC: CPT

## 2019-05-15 PROCEDURE — 99999 PR PBB SHADOW E&M-EST. PATIENT-LVL IV: CPT | Mod: PBBFAC,,, | Performed by: NURSE PRACTITIONER

## 2019-05-15 PROCEDURE — 99214 OFFICE O/P EST MOD 30 MIN: CPT | Mod: S$GLB,,, | Performed by: NURSE PRACTITIONER

## 2019-05-15 NOTE — PATIENT INSTRUCTIONS
Instructions:  - continue humira 40 mg SC every 7 days, due 5/18/2019  - labs today  - Continue vitamin D3 2000 IU  - colonoscopy due 10/2020  - Avoid all NSAIDs (Advil, Ibuprofen, Motrin, Aspirin, Naprosyn, Aleve)  - Yearly Eye exam due now  - Yearly Skin exam--wear sun block and hats due now  - Use antibiotics with caution  - For Dental Procedures, use antibiotics only if absolutely necessary  - Please make sure you establish care with a PCP  - If you have to take antibiotics for any infection, please take a 2 week course of OTC Florastor 1 capsule twice daily probiotic after completion of the antibiotic course  - If you are on any immunosuppressive medications (for example: remicade, humira, imuran, entyvio...), you should refrain from eating any raw seafood  - Vaccines recommended:  Flu shot yearly  Tetanus every 10 years  Shingrix series  - Follow up with Dr. Spence in 6 months

## 2019-05-16 ENCOUNTER — PATIENT MESSAGE (OUTPATIENT)
Dept: GASTROENTEROLOGY | Facility: CLINIC | Age: 34
End: 2019-05-16

## 2019-05-16 LAB
HBV CORE AB SERPL QL IA: NEGATIVE
HBV SURFACE AG SERPL QL IA: NEGATIVE

## 2019-05-22 ENCOUNTER — TELEPHONE (OUTPATIENT)
Dept: PHARMACY | Facility: CLINIC | Age: 34
End: 2019-05-22

## 2019-06-24 ENCOUNTER — TELEPHONE (OUTPATIENT)
Dept: PHARMACY | Facility: CLINIC | Age: 34
End: 2019-06-24

## 2019-06-24 DIAGNOSIS — K50.813 CROHN'S DISEASE OF BOTH SMALL AND LARGE INTESTINE WITH FISTULA: ICD-10-CM

## 2019-08-21 ENCOUNTER — TELEPHONE (OUTPATIENT)
Dept: PHARMACY | Facility: CLINIC | Age: 34
End: 2019-08-21

## 2019-09-16 ENCOUNTER — TELEPHONE (OUTPATIENT)
Dept: PHARMACY | Facility: CLINIC | Age: 34
End: 2019-09-16

## 2019-09-16 NOTE — TELEPHONE ENCOUNTER
Refill call regarding Humira at [facility]. Will prepare for shipment on   to arrive  with pt consent. Copay 0.00. Patient has not started any new medications, no missed doses/ side effects. Patient did not have any concerns or questions for the pharmacist at this time.  & address verified.  ~ next injection with 0 doses on hand.

## 2019-10-14 ENCOUNTER — TELEPHONE (OUTPATIENT)
Dept: PHARMACY | Facility: CLINIC | Age: 34
End: 2019-10-14

## 2019-11-11 ENCOUNTER — TELEPHONE (OUTPATIENT)
Dept: PHARMACY | Facility: CLINIC | Age: 34
End: 2019-11-11

## 2019-11-11 ENCOUNTER — PATIENT MESSAGE (OUTPATIENT)
Dept: PHARMACY | Facility: CLINIC | Age: 34
End: 2019-11-11

## 2019-11-13 NOTE — TELEPHONE ENCOUNTER
Humira Refill/ Follow Up completed on . Pt confirmed shipping of Humira on  to arrive on 11/15. Address and  verified. $0 copay in 004. Pt is in need of a sharps container at this time.  Pt has 0 doses on hand, next dose due 11/15. Pt reported no missed doses. Pt denies any injection site reactions or side effects. Pt did not start any new medications. Pt had no further questions or concerns.

## 2019-12-05 ENCOUNTER — TELEPHONE (OUTPATIENT)
Dept: GASTROENTEROLOGY | Facility: CLINIC | Age: 34
End: 2019-12-05

## 2019-12-05 NOTE — TELEPHONE ENCOUNTER
Pt called my direct line  He needed to get his follow up rescheduled  I asked if he was having any issues.  He stated No he just was out of town and had to CX his last one

## 2019-12-10 DIAGNOSIS — K50.813 CROHN'S DISEASE OF BOTH SMALL AND LARGE INTESTINE WITH FISTULA: ICD-10-CM

## 2019-12-11 ENCOUNTER — TELEPHONE (OUTPATIENT)
Dept: PHARMACY | Facility: CLINIC | Age: 34
End: 2019-12-11

## 2019-12-29 ENCOUNTER — TELEPHONE (OUTPATIENT)
Dept: GASTROENTEROLOGY | Facility: HOSPITAL | Age: 34
End: 2019-12-29

## 2019-12-29 ENCOUNTER — ANESTHESIA EVENT (OUTPATIENT)
Dept: SURGERY | Facility: HOSPITAL | Age: 34
DRG: 345 | End: 2019-12-29
Payer: COMMERCIAL

## 2019-12-29 ENCOUNTER — HOSPITAL ENCOUNTER (INPATIENT)
Facility: HOSPITAL | Age: 34
LOS: 1 days | Discharge: HOME | DRG: 345 | End: 2019-12-30
Attending: EMERGENCY MEDICINE | Admitting: COLON & RECTAL SURGERY
Payer: COMMERCIAL

## 2019-12-29 DIAGNOSIS — D84.9 IMMUNOSUPPRESSED STATUS: ICD-10-CM

## 2019-12-29 DIAGNOSIS — K50.113 CROHN'S DISEASE OF LARGE INTESTINE WITH FISTULA: ICD-10-CM

## 2019-12-29 DIAGNOSIS — K50.919 CROHN'S DISEASE WITH COMPLICATION, UNSPECIFIED GASTROINTESTINAL TRACT LOCATION: ICD-10-CM

## 2019-12-29 DIAGNOSIS — K61.1 PERIRECTAL ABSCESS: Primary | ICD-10-CM

## 2019-12-29 DIAGNOSIS — K50.919 CROHN'S DISEASE WITH COMPLICATION, UNSPECIFIED GASTROINTESTINAL TRACT LOCATION: Primary | ICD-10-CM

## 2019-12-29 LAB
ANION GAP SERPL CALC-SCNC: 9 MMOL/L (ref 8–16)
BASOPHILS # BLD AUTO: 0.11 K/UL (ref 0–0.2)
BASOPHILS NFR BLD: 0.7 % (ref 0–1.9)
BUN SERPL-MCNC: 9 MG/DL (ref 6–20)
CALCIUM SERPL-MCNC: 9.3 MG/DL (ref 8.7–10.5)
CHLORIDE SERPL-SCNC: 104 MMOL/L (ref 95–110)
CO2 SERPL-SCNC: 27 MMOL/L (ref 23–29)
CREAT SERPL-MCNC: 0.9 MG/DL (ref 0.5–1.4)
DIFFERENTIAL METHOD: ABNORMAL
EOSINOPHIL # BLD AUTO: 0.8 K/UL (ref 0–0.5)
EOSINOPHIL NFR BLD: 5.1 % (ref 0–8)
ERYTHROCYTE [DISTWIDTH] IN BLOOD BY AUTOMATED COUNT: 12.1 % (ref 11.5–14.5)
EST. GFR  (AFRICAN AMERICAN): >60 ML/MIN/1.73 M^2
EST. GFR  (NON AFRICAN AMERICAN): >60 ML/MIN/1.73 M^2
GLUCOSE SERPL-MCNC: 88 MG/DL (ref 70–110)
HCT VFR BLD AUTO: 44.3 % (ref 40–54)
HGB BLD-MCNC: 14.5 G/DL (ref 14–18)
IMM GRANULOCYTES # BLD AUTO: 0.05 K/UL (ref 0–0.04)
IMM GRANULOCYTES NFR BLD AUTO: 0.3 % (ref 0–0.5)
LYMPHOCYTES # BLD AUTO: 3 K/UL (ref 1–4.8)
LYMPHOCYTES NFR BLD: 18.2 % (ref 18–48)
MCH RBC QN AUTO: 30.1 PG (ref 27–31)
MCHC RBC AUTO-ENTMCNC: 32.7 G/DL (ref 32–36)
MCV RBC AUTO: 92 FL (ref 82–98)
MONOCYTES # BLD AUTO: 1.3 K/UL (ref 0.3–1)
MONOCYTES NFR BLD: 7.9 % (ref 4–15)
NEUTROPHILS # BLD AUTO: 11.3 K/UL (ref 1.8–7.7)
NEUTROPHILS NFR BLD: 67.8 % (ref 38–73)
NRBC BLD-RTO: 0 /100 WBC
PLATELET # BLD AUTO: 367 K/UL (ref 150–350)
PMV BLD AUTO: 9.8 FL (ref 9.2–12.9)
POTASSIUM SERPL-SCNC: 3.9 MMOL/L (ref 3.5–5.1)
RBC # BLD AUTO: 4.81 M/UL (ref 4.6–6.2)
SODIUM SERPL-SCNC: 140 MMOL/L (ref 136–145)
WBC # BLD AUTO: 16.63 K/UL (ref 3.9–12.7)

## 2019-12-29 PROCEDURE — 11000001 HC ACUTE MED/SURG PRIVATE ROOM

## 2019-12-29 PROCEDURE — S0030 INJECTION, METRONIDAZOLE: HCPCS | Performed by: STUDENT IN AN ORGANIZED HEALTH CARE EDUCATION/TRAINING PROGRAM

## 2019-12-29 PROCEDURE — 99285 PR EMERGENCY DEPT VISIT,LEVEL V: ICD-10-PCS | Mod: ,,, | Performed by: PHYSICIAN ASSISTANT

## 2019-12-29 PROCEDURE — 25000003 PHARM REV CODE 250: Performed by: STUDENT IN AN ORGANIZED HEALTH CARE EDUCATION/TRAINING PROGRAM

## 2019-12-29 PROCEDURE — 63600175 PHARM REV CODE 636 W HCPCS: Performed by: PHYSICIAN ASSISTANT

## 2019-12-29 PROCEDURE — 99285 EMERGENCY DEPT VISIT HI MDM: CPT | Mod: 25

## 2019-12-29 PROCEDURE — 99285 EMERGENCY DEPT VISIT HI MDM: CPT | Mod: ,,, | Performed by: PHYSICIAN ASSISTANT

## 2019-12-29 PROCEDURE — A9585 GADOBUTROL INJECTION: HCPCS | Performed by: COLON & RECTAL SURGERY

## 2019-12-29 PROCEDURE — 80048 BASIC METABOLIC PNL TOTAL CA: CPT

## 2019-12-29 PROCEDURE — 85025 COMPLETE CBC W/AUTO DIFF WBC: CPT

## 2019-12-29 PROCEDURE — 63600175 PHARM REV CODE 636 W HCPCS: Performed by: STUDENT IN AN ORGANIZED HEALTH CARE EDUCATION/TRAINING PROGRAM

## 2019-12-29 PROCEDURE — 96374 THER/PROPH/DIAG INJ IV PUSH: CPT

## 2019-12-29 PROCEDURE — 25500020 PHARM REV CODE 255: Performed by: COLON & RECTAL SURGERY

## 2019-12-29 RX ORDER — KETOROLAC TROMETHAMINE 30 MG/ML
15 INJECTION, SOLUTION INTRAMUSCULAR; INTRAVENOUS
Status: DISCONTINUED | OUTPATIENT
Start: 2019-12-29 | End: 2019-12-29

## 2019-12-29 RX ORDER — ACETAMINOPHEN 325 MG/1
650 TABLET ORAL EVERY 8 HOURS PRN
Status: DISCONTINUED | OUTPATIENT
Start: 2019-12-29 | End: 2019-12-30 | Stop reason: HOSPADM

## 2019-12-29 RX ORDER — GADOBUTROL 604.72 MG/ML
10 INJECTION INTRAVENOUS
Status: COMPLETED | OUTPATIENT
Start: 2019-12-29 | End: 2019-12-29

## 2019-12-29 RX ORDER — MORPHINE SULFATE 2 MG/ML
2 INJECTION, SOLUTION INTRAMUSCULAR; INTRAVENOUS EVERY 4 HOURS PRN
Status: DISCONTINUED | OUTPATIENT
Start: 2019-12-29 | End: 2019-12-30

## 2019-12-29 RX ORDER — TALC
6 POWDER (GRAM) TOPICAL NIGHTLY PRN
Status: DISCONTINUED | OUTPATIENT
Start: 2019-12-29 | End: 2019-12-30 | Stop reason: HOSPADM

## 2019-12-29 RX ORDER — HYDROCODONE BITARTRATE AND ACETAMINOPHEN 5; 325 MG/1; MG/1
1 TABLET ORAL
Status: DISCONTINUED | OUTPATIENT
Start: 2019-12-29 | End: 2019-12-29

## 2019-12-29 RX ORDER — MORPHINE SULFATE 2 MG/ML
6 INJECTION, SOLUTION INTRAMUSCULAR; INTRAVENOUS
Status: COMPLETED | OUTPATIENT
Start: 2019-12-29 | End: 2019-12-29

## 2019-12-29 RX ORDER — SODIUM CHLORIDE 0.9 % (FLUSH) 0.9 %
10 SYRINGE (ML) INJECTION
Status: DISCONTINUED | OUTPATIENT
Start: 2019-12-29 | End: 2019-12-30 | Stop reason: HOSPADM

## 2019-12-29 RX ORDER — CIPROFLOXACIN 2 MG/ML
400 INJECTION, SOLUTION INTRAVENOUS
Status: DISCONTINUED | OUTPATIENT
Start: 2019-12-29 | End: 2019-12-30 | Stop reason: HOSPADM

## 2019-12-29 RX ORDER — METRONIDAZOLE 500 MG/100ML
500 INJECTION, SOLUTION INTRAVENOUS
Status: DISCONTINUED | OUTPATIENT
Start: 2019-12-29 | End: 2019-12-30 | Stop reason: HOSPADM

## 2019-12-29 RX ORDER — SODIUM CHLORIDE, SODIUM LACTATE, POTASSIUM CHLORIDE, CALCIUM CHLORIDE 600; 310; 30; 20 MG/100ML; MG/100ML; MG/100ML; MG/100ML
INJECTION, SOLUTION INTRAVENOUS CONTINUOUS
Status: DISCONTINUED | OUTPATIENT
Start: 2019-12-29 | End: 2019-12-30 | Stop reason: HOSPADM

## 2019-12-29 RX ORDER — MORPHINE SULFATE 4 MG/ML
4 INJECTION, SOLUTION INTRAMUSCULAR; INTRAVENOUS EVERY 4 HOURS PRN
Status: DISCONTINUED | OUTPATIENT
Start: 2019-12-29 | End: 2019-12-30

## 2019-12-29 RX ORDER — ONDANSETRON 2 MG/ML
4 INJECTION INTRAMUSCULAR; INTRAVENOUS EVERY 12 HOURS PRN
Status: DISCONTINUED | OUTPATIENT
Start: 2019-12-29 | End: 2019-12-30 | Stop reason: HOSPADM

## 2019-12-29 RX ADMIN — MORPHINE SULFATE 6 MG: 2 INJECTION, SOLUTION INTRAMUSCULAR; INTRAVENOUS at 01:12

## 2019-12-29 RX ADMIN — MORPHINE SULFATE 4 MG: 4 INJECTION INTRAVENOUS at 10:12

## 2019-12-29 RX ADMIN — METRONIDAZOLE 500 MG: 500 INJECTION, SOLUTION INTRAVENOUS at 02:12

## 2019-12-29 RX ADMIN — MORPHINE SULFATE 2 MG: 2 INJECTION, SOLUTION INTRAMUSCULAR; INTRAVENOUS at 06:12

## 2019-12-29 RX ADMIN — GADOBUTROL 10 ML: 604.72 INJECTION INTRAVENOUS at 07:12

## 2019-12-29 RX ADMIN — METRONIDAZOLE 500 MG: 500 INJECTION, SOLUTION INTRAVENOUS at 11:12

## 2019-12-29 RX ADMIN — SODIUM CHLORIDE, SODIUM LACTATE, POTASSIUM CHLORIDE, AND CALCIUM CHLORIDE 1000 ML: .6; .31; .03; .02 INJECTION, SOLUTION INTRAVENOUS at 02:12

## 2019-12-29 RX ADMIN — CIPROFLOXACIN 400 MG: 2 INJECTION, SOLUTION INTRAVENOUS at 03:12

## 2019-12-29 RX ADMIN — MORPHINE SULFATE 2 MG: 2 INJECTION, SOLUTION INTRAMUSCULAR; INTRAVENOUS at 03:12

## 2019-12-29 NOTE — CONSULTS
[facility]  General Surgery  Consult Note    Consults  Subjective:     Chief Complaint/Reason for Admission: Perirectal abscess    History of Present Illness: 35 y/o M with Crohn's well controlled on Humera, presents with a couple days of perianal/ perirectal pain.  Normally has intermittent drainage from the area where he had a prior fistula that has had a seton.  The drainage has stopped for a couple of days and the pain increased.      No current facility-administered medications on file prior to encounter.      Current Outpatient Medications on File Prior to Encounter   Medication Sig    adalimumab (HUMIRA) PnKt injection Inject 0.8 mLs (40 mg total) into the skin every 7 days.    AFLURIA QD -,3YR UP,,PF, 60 mcg (15 mcg x 4)/0.5 mL Syrg TO BE ADMINISTERED BY PHARMACIST FOR IMMUNIZATION       Review of patient's allergies indicates:  No Known Allergies    Past Medical History:   Diagnosis Date    Crohn's disease of large intestine with fistula 10/10/2018     Past Surgical History:   Procedure Laterality Date    ABSCESS DRAINAGE      ANAL FISTULOTOMY      COLONOSCOPY      COLONOSCOPY N/A 2017    Procedure: COLONOSCOPY;  Surgeon: Bubba Spence MD;  Location: [facility] ([address] [place] [address]);  Service: Endoscopy;  Laterality: N/A;    COLONOSCOPY N/A 10/10/2018    Procedure: COLONOSCOPY;  Surgeon: Bubba Spence MD;  Location: [facility] ([address] [place] [address]);  Service: Endoscopy;  Laterality: N/A;  schedule as 45 minute case--due 2018    UPPER GASTROINTESTINAL ENDOSCOPY       Family History     Problem Relation (Age of Onset)    ALS Paternal Grandfather        Tobacco Use    Smoking status: Former Smoker     Types: Cigarettes     Last attempt to quit: 10/24/2015     Years since quittin.1    Smokeless tobacco: Never Used   Substance and Sexual Activity    Alcohol use: Yes     Comment: 1-2 weekly    Drug use: No    Sexual activity: Yes     Partners: Female     Comment:        Review of Systems  Objective:     Vital Signs (Most Recent):  Temp: 99.6 °F (37.6 °C) (12/29/19 1404)  Pulse: 86 (12/29/19 1404)  Resp: 16 (12/29/19 1404)  BP: 127/75 (12/29/19 1404)  SpO2: 96 % (12/29/19 1404) Vital Signs (24h Range):  Temp:  [98.6 °F (37 °C)-99.6 °F (37.6 °C)] 99.6 °F (37.6 °C)  Pulse:  [] 86  Resp:  [16-18] 16  SpO2:  [96 %-97 %] 96 %  BP: (127-171)/(70-75) 127/75     Weight: 86.2 kg (190 lb)  Body mass index is 25.07 kg/m².    No intake or output data in the 24 hours ending 12/29/19 1434    Physical Exam   Constitutional: He is oriented to person, place, and time. He appears well-developed and well-nourished.   HENT:   Head: Normocephalic.   Eyes: Conjunctivae are normal.   Neck: Normal range of motion.   Cardiovascular: Normal rate and regular rhythm.   Pulmonary/Chest: Effort normal and breath sounds normal.   Abdominal: Soft. Bowel sounds are normal.   Genitourinary:   Genitourinary Comments: No gross perineal abnormality.  There is some induration on palpation of the right posterior anal area.  Associated induration within the anal canal. No clear fluctuance or erythema   Musculoskeletal: Normal range of motion.   Neurological: He is alert and oriented to person, place, and time.   Skin: Skin is warm.   Psychiatric: He has a normal mood and affect.       Significant Labs:  BMP:   Recent Labs   Lab 12/29/19  1340   GLU 88      K 3.9      CO2 27   BUN 9   CREATININE 0.9   CALCIUM 9.3     CBC:   Recent Labs   Lab 12/29/19  1340   WBC 16.63*   RBC 4.81   HGB 14.5   HCT 44.3   *   MCV 92   MCH 30.1   MCHC 32.7       Significant Diagnostics:  Pending CT    Assessment/Plan:     Active Diagnoses:    Diagnosis Date Noted POA    Perirectal abscess [K61.1] 12/29/2019 Yes      Problems Resolved During this Admission:     Admit to CRS  CT scan pending  Cipro/flagyl  NPO  Possible EUA pending CT results      Thank you for your consult. Admitted    Raul Ma MD  General  Surgery  [facility]

## 2019-12-29 NOTE — ED NOTES
I and D set up at bedside. Pt gowned for exam.Wife at bedside.  
LOC: The patient is awake, alert and aware of environment with an appropriate affect, the patient is oriented x 3 and speaking appropriately.  APPEARANCE: Patient resting comfortably and in no acute distress, patient is clean and well groomed, patient's clothing is properly fastened.  SKIN: The skin is warm and dry, color consistent with ethnicity, patient has normal skin turgor and moist mucus membranes, skin intact, no breakdown or bruising noted.  MUSCULOSKELETAL: Patient moving all extremities spontaneously, no obvious swelling or deformities noted.  RESPIRATORY: Airway is open and patent, respirations are spontaneous, patient has a normal effort and rate, no accessory muscle use noted.  ABDOMEN: Soft and non tender to palpation, no distention noted.  
Markie Romero

## 2019-12-29 NOTE — TELEPHONE ENCOUNTER
Has a mauricio-anal abscess that generally is draining. Has seton placed by Kamla recently. Feels the seton has closed, not draining, swelling, with pain. Feels golf-ball sized pocket. Very painful, especially with walking. Has been trying to soak. Onset x2 days.    No fever/chills/sweats. Eating and drinking without issues. Moving bowels without issue.    On humira only, next dose due Friday (q1 wk dose). No immunomodulator. Not seen by CRS since 3/2019 in clinic.    Discussed concerning features such as fever/chills or signs of infection, intolerance of PO and reasons to present to the ED for evaluation. He mentions he thinks he will go to urgent care to have it drained. I recommended if he is considering this, given that CRS placed the seton he would be better served reaching out to their team as they may prefer to see him in the ED to evaluate it and decide on proper I&D as opposed to urgent care. He agreed and will call them.    Discussed with Dr. Spence. Recommend presenting to the ED for evaluation with CRS evaluation. Will determine whether to dose humira this week pending CRS evaluation.  - has not had humira levels in 1 yr, will order for Thursday (order placed). Will hold humira pending I&D.  - called patient and gave above recommendations. Voiced agreement.    Has pending appointment 1/27.

## 2019-12-29 NOTE — ED PROVIDER NOTES
Encounter Date: 12/29/2019       History     Chief Complaint   Patient presents with    Rectal Pain     onset about a day and half ago, reports has an abscess, had needed drained by CRS in past about a year ago     34-year-old male with Crohn's disease on Humira chronic anal fistula presents for rectal pain for 2 days.  The patient reports noticing discomfort in his rectum with a golf ball sized firm nodule.  He has had a small amount of purulent discharge from the site.  He has been doing Sitz baths without any improvement.  He denies trauma, bleeding, rectal bleeding, abdominal pain, trouble with bowel movements or fever.        Review of patient's allergies indicates:  No Known Allergies  Past Medical History:   Diagnosis Date    Crohn's disease of large intestine with fistula 10/10/2018     Past Surgical History:   Procedure Laterality Date    ABSCESS DRAINAGE      ANAL FISTULOTOMY      COLONOSCOPY      COLONOSCOPY N/A 4/7/2017    Procedure: COLONOSCOPY;  Surgeon: Bubba Spence MD;  Location: [address]);  Service: Endoscopy;  Laterality: N/A;    COLONOSCOPY N/A 10/10/2018    Procedure: COLONOSCOPY;  Surgeon: Bubba Spence MD;  Location: [facility] ([address] [place] [address]);  Service: Endoscopy;  Laterality: N/A;  schedule as 45 minute case--due Fall 2018    UPPER GASTROINTESTINAL ENDOSCOPY       Family History   Problem Relation Age of Onset    ALS Paternal Grandfather     Celiac disease Neg Hx     Cirrhosis Neg Hx     Colon cancer Neg Hx     Colon polyps Neg Hx     Crohn's disease Neg Hx     Cystic fibrosis Neg Hx     Esophageal cancer Neg Hx     Hemochromatosis Neg Hx     Inflammatory bowel disease Neg Hx     Irritable bowel syndrome Neg Hx     Liver cancer Neg Hx     Liver disease Neg Hx     Rectal cancer Neg Hx     Stomach cancer Neg Hx     Ulcerative colitis Neg Hx     Roby's disease Neg Hx     Lupus Neg Hx     Multiple sclerosis Neg Hx     Tuberculosis Neg Hx     Rheum  arthritis Neg Hx     Scleroderma Neg Hx     Lymphoma Neg Hx      Social History     Tobacco Use    Smoking status: Former Smoker     Types: Cigarettes     Last attempt to quit: 10/24/2015     Years since quittin.1    Smokeless tobacco: Never Used   Substance Use Topics    Alcohol use: Yes     Comment: 1-2 weekly    Drug use: No     Review of Systems   Constitutional: Negative for fever.   Respiratory: Negative for shortness of breath.    Cardiovascular: Negative for chest pain.   Gastrointestinal: Positive for rectal pain. Negative for abdominal distention, abdominal pain, anal bleeding, blood in stool, constipation, diarrhea, nausea and vomiting.   Genitourinary: Negative for dysuria.   Musculoskeletal: Negative for back pain.   Skin: Positive for wound. Negative for rash.   Allergic/Immunologic: Positive for immunocompromised state.   Neurological: Negative for weakness.   Hematological: Does not bruise/bleed easily.       Physical Exam     Initial Vitals [19 1311]   BP Pulse Resp Temp SpO2   (!) 171/70 104 18 98.6 °F (37 °C) 97 %      MAP       --         Physical Exam    Nursing note and vitals reviewed.  Constitutional: He appears well-developed and well-nourished. He is not diaphoretic. No distress.   Appears uncomfortable, huddled in fetal position   HENT:   Head: Normocephalic and atraumatic.   Eyes: EOM are normal. Pupils are equal, round, and reactive to light.   Neck: Normal range of motion. Neck supple.   Cardiovascular: Normal rate, regular rhythm, normal heart sounds and intact distal pulses. Exam reveals no gallop and no friction rub.    No murmur heard.  Pulmonary/Chest: Breath sounds normal. No respiratory distress. He has no wheezes. He has no rhonchi. He has no rales. He exhibits no tenderness.   Abdominal: Soft. Bowel sounds are normal. He exhibits no distension and no mass. There is no tenderness. There is no rebound and no guarding.   Genitourinary:   Genitourinary Comments: RN  present as chaperone for rectal exam.  There is a firm nodule 2-3 cm wide at the 7:00 position of the rectum.  Exquisitely tender to palpation. There is no induration, erythema, warmth, discharge or bleeding.    There are several nontender anal warts   Musculoskeletal: Normal range of motion.   Neurological: He is alert and oriented to person, place, and time.   Skin: Skin is warm and dry.   Psychiatric: He has a normal mood and affect.         ED Course   Procedures  Labs Reviewed   CBC W/ AUTO DIFFERENTIAL - Abnormal; Notable for the following components:       Result Value    WBC 16.63 (*)     Platelets 367 (*)     Gran # (ANC) 11.3 (*)     Immature Grans (Abs) 0.05 (*)     Mono # 1.3 (*)     Eos # 0.8 (*)     All other components within normal limits   BASIC METABOLIC PANEL          Imaging Results    None          Medical Decision Making:   History:   Old Medical Records: I decided to obtain old medical records.  Old Records Summarized: records from clinic visits.       <> Summary of Records: Patient contacted his Gastroenterology doctor today regarding his rectal pain. He was advised to come to the ED due to concern that colorectal surgery may need to become involved.  Initial Assessment:   34-year-old male with Crohn's disease in chronic rectal fistula presenting for rectal pain. He is initially hypertensive and mildly tachycardic with otherwise normal vitals.  He has a firm, tender, swollen nodule on his rectum.  Differential Diagnosis:   Perirectal abscess  Rectal fistula  Rectal cellulitis  Low suspicion for systemic infection, however patient is immunosuppressed on Humira  Clinical Tests:   Lab Tests: Ordered and Reviewed  ED Management:  Will give morphine for pain and consult Colorectal surgery.  Given patient's complicated fistula history as well as immune suppression, I do not feel comfortable draining his abscess in the ED.    Patient seen and evaluated by Colorectal surgery.  They will admit the  patient to their service for surgical repair.  Patient comfortable with admission.  Other:   I have discussed this case with another health care provider.       <> Summary of the Discussion: I discussed this patient with Colorectal surgery.  Colorectal surgery resident recommends obtaining baseline labs and they will evaluate the patient.                   ED Course as of Dec 29 1359   Sun Dec 29, 2019   1357 Leukocytosis with WBC count of 16.63   CBC auto differential(!) [CC]      ED Course User Index  [CC] Mary Jo Mcnally PA-C                Clinical Impression:       ICD-10-CM ICD-9-CM   1. Perirectal abscess K61.1 566   2. Crohn's disease with complication, unspecified gastrointestinal tract location K50.919 555.9   3. Immunosuppressed status D89.9 279.9         Disposition:   Disposition: Admitted  Condition: Fair                     Mary Jo Mcnally PA-C  12/29/19 1443

## 2019-12-29 NOTE — ED TRIAGE NOTES
"Pt with rectal fistula, followed by CRS.  Pt hx crohn's dz.  Pt states this episode started yesterday with pain and feels a "golf ball down there".  Pt denies bleeding    "

## 2019-12-30 ENCOUNTER — ANESTHESIA (OUTPATIENT)
Dept: SURGERY | Facility: HOSPITAL | Age: 34
DRG: 345 | End: 2019-12-30
Payer: COMMERCIAL

## 2019-12-30 ENCOUNTER — TELEPHONE (OUTPATIENT)
Dept: GASTROENTEROLOGY | Facility: CLINIC | Age: 34
End: 2019-12-30

## 2019-12-30 VITALS
RESPIRATION RATE: 16 BRPM | HEART RATE: 98 BPM | TEMPERATURE: 99 F | WEIGHT: 190 LBS | DIASTOLIC BLOOD PRESSURE: 74 MMHG | OXYGEN SATURATION: 96 % | SYSTOLIC BLOOD PRESSURE: 131 MMHG | BODY MASS INDEX: 25.18 KG/M2 | HEIGHT: 73 IN

## 2019-12-30 LAB
ALBUMIN SERPL BCP-MCNC: 3.6 G/DL (ref 3.5–5.2)
ALP SERPL-CCNC: 65 U/L (ref 55–135)
ALT SERPL W/O P-5'-P-CCNC: 12 U/L (ref 10–44)
ANION GAP SERPL CALC-SCNC: 10 MMOL/L (ref 8–16)
AST SERPL-CCNC: 16 U/L (ref 10–40)
BASOPHILS # BLD AUTO: 0.08 K/UL (ref 0–0.2)
BASOPHILS NFR BLD: 0.6 % (ref 0–1.9)
BILIRUB DIRECT SERPL-MCNC: 0.4 MG/DL (ref 0.1–0.3)
BILIRUB SERPL-MCNC: 0.9 MG/DL (ref 0.1–1)
BUN SERPL-MCNC: 9 MG/DL (ref 6–20)
CALCIUM SERPL-MCNC: 9.1 MG/DL (ref 8.7–10.5)
CHLORIDE SERPL-SCNC: 103 MMOL/L (ref 95–110)
CO2 SERPL-SCNC: 26 MMOL/L (ref 23–29)
CREAT SERPL-MCNC: 0.8 MG/DL (ref 0.5–1.4)
DIFFERENTIAL METHOD: ABNORMAL
EOSINOPHIL # BLD AUTO: 0.6 K/UL (ref 0–0.5)
EOSINOPHIL NFR BLD: 4.7 % (ref 0–8)
ERYTHROCYTE [DISTWIDTH] IN BLOOD BY AUTOMATED COUNT: 12.1 % (ref 11.5–14.5)
EST. GFR  (AFRICAN AMERICAN): >60 ML/MIN/1.73 M^2
EST. GFR  (NON AFRICAN AMERICAN): >60 ML/MIN/1.73 M^2
GLUCOSE SERPL-MCNC: 91 MG/DL (ref 70–110)
HCT VFR BLD AUTO: 42.1 % (ref 40–54)
HGB BLD-MCNC: 13.4 G/DL (ref 14–18)
IMM GRANULOCYTES # BLD AUTO: 0.05 K/UL (ref 0–0.04)
IMM GRANULOCYTES NFR BLD AUTO: 0.4 % (ref 0–0.5)
LYMPHOCYTES # BLD AUTO: 2.1 K/UL (ref 1–4.8)
LYMPHOCYTES NFR BLD: 16 % (ref 18–48)
MCH RBC QN AUTO: 29.8 PG (ref 27–31)
MCHC RBC AUTO-ENTMCNC: 31.8 G/DL (ref 32–36)
MCV RBC AUTO: 94 FL (ref 82–98)
MONOCYTES # BLD AUTO: 1.2 K/UL (ref 0.3–1)
MONOCYTES NFR BLD: 8.8 % (ref 4–15)
NEUTROPHILS # BLD AUTO: 9.3 K/UL (ref 1.8–7.7)
NEUTROPHILS NFR BLD: 69.5 % (ref 38–73)
NRBC BLD-RTO: 0 /100 WBC
PLATELET # BLD AUTO: 324 K/UL (ref 150–350)
PMV BLD AUTO: 10.3 FL (ref 9.2–12.9)
POTASSIUM SERPL-SCNC: 3.9 MMOL/L (ref 3.5–5.1)
PROT SERPL-MCNC: 7.1 G/DL (ref 6–8.4)
RBC # BLD AUTO: 4.5 M/UL (ref 4.6–6.2)
SODIUM SERPL-SCNC: 139 MMOL/L (ref 136–145)
WBC # BLD AUTO: 13.37 K/UL (ref 3.9–12.7)

## 2019-12-30 PROCEDURE — 25000003 PHARM REV CODE 250: Performed by: COLON & RECTAL SURGERY

## 2019-12-30 PROCEDURE — 25000003 PHARM REV CODE 250: Performed by: NURSE ANESTHETIST, CERTIFIED REGISTERED

## 2019-12-30 PROCEDURE — 63600175 PHARM REV CODE 636 W HCPCS: Performed by: SURGERY

## 2019-12-30 PROCEDURE — 46040 I&D ISCHIORCT&/PERIRCT ABSC: CPT | Mod: ,,, | Performed by: COLON & RECTAL SURGERY

## 2019-12-30 PROCEDURE — D9220A PRA ANESTHESIA: ICD-10-PCS | Mod: ANES,,, | Performed by: ANESTHESIOLOGY

## 2019-12-30 PROCEDURE — 80076 HEPATIC FUNCTION PANEL: CPT

## 2019-12-30 PROCEDURE — 36415 COLL VENOUS BLD VENIPUNCTURE: CPT

## 2019-12-30 PROCEDURE — 37000008 HC ANESTHESIA 1ST 15 MINUTES: Performed by: COLON & RECTAL SURGERY

## 2019-12-30 PROCEDURE — 36000705 HC OR TIME LEV I EA ADD 15 MIN: Performed by: COLON & RECTAL SURGERY

## 2019-12-30 PROCEDURE — 71000039 HC RECOVERY, EACH ADD'L HOUR: Performed by: COLON & RECTAL SURGERY

## 2019-12-30 PROCEDURE — 63600175 PHARM REV CODE 636 W HCPCS: Performed by: STUDENT IN AN ORGANIZED HEALTH CARE EDUCATION/TRAINING PROGRAM

## 2019-12-30 PROCEDURE — 80048 BASIC METABOLIC PNL TOTAL CA: CPT

## 2019-12-30 PROCEDURE — 63600175 PHARM REV CODE 636 W HCPCS: Performed by: NURSE ANESTHETIST, CERTIFIED REGISTERED

## 2019-12-30 PROCEDURE — 94761 N-INVAS EAR/PLS OXIMETRY MLT: CPT

## 2019-12-30 PROCEDURE — 27201423 OPTIME MED/SURG SUP & DEVICES STERILE SUPPLY: Performed by: COLON & RECTAL SURGERY

## 2019-12-30 PROCEDURE — 71000033 HC RECOVERY, INTIAL HOUR: Performed by: COLON & RECTAL SURGERY

## 2019-12-30 PROCEDURE — 25000003 PHARM REV CODE 250: Performed by: STUDENT IN AN ORGANIZED HEALTH CARE EDUCATION/TRAINING PROGRAM

## 2019-12-30 PROCEDURE — D9220A PRA ANESTHESIA: ICD-10-PCS | Mod: CRNA,,, | Performed by: NURSE ANESTHETIST, CERTIFIED REGISTERED

## 2019-12-30 PROCEDURE — 85025 COMPLETE CBC W/AUTO DIFF WBC: CPT

## 2019-12-30 PROCEDURE — 46040 PR I&D PERIRECTAL ABSCESS: ICD-10-PCS | Mod: ,,, | Performed by: COLON & RECTAL SURGERY

## 2019-12-30 PROCEDURE — D9220A PRA ANESTHESIA: Mod: CRNA,,, | Performed by: NURSE ANESTHETIST, CERTIFIED REGISTERED

## 2019-12-30 PROCEDURE — 37000009 HC ANESTHESIA EA ADD 15 MINS: Performed by: COLON & RECTAL SURGERY

## 2019-12-30 PROCEDURE — D9220A PRA ANESTHESIA: Mod: ANES,,, | Performed by: ANESTHESIOLOGY

## 2019-12-30 PROCEDURE — 36000704 HC OR TIME LEV I 1ST 15 MIN: Performed by: COLON & RECTAL SURGERY

## 2019-12-30 PROCEDURE — S0030 INJECTION, METRONIDAZOLE: HCPCS | Performed by: STUDENT IN AN ORGANIZED HEALTH CARE EDUCATION/TRAINING PROGRAM

## 2019-12-30 PROCEDURE — 25000003 PHARM REV CODE 250: Performed by: SURGERY

## 2019-12-30 RX ORDER — CIPROFLOXACIN 500 MG/1
500 TABLET ORAL EVERY 12 HOURS
Qty: 28 TABLET | Refills: 0 | Status: SHIPPED | OUTPATIENT
Start: 2019-12-30 | End: 2020-01-13

## 2019-12-30 RX ORDER — LIDOCAINE HCL/PF 100 MG/5ML
SYRINGE (ML) INTRAVENOUS
Status: DISCONTINUED | OUTPATIENT
Start: 2019-12-30 | End: 2019-12-30

## 2019-12-30 RX ORDER — SODIUM CHLORIDE 9 MG/ML
INJECTION, SOLUTION INTRAVENOUS CONTINUOUS PRN
Status: DISCONTINUED | OUTPATIENT
Start: 2019-12-30 | End: 2019-12-30

## 2019-12-30 RX ORDER — ACETAMINOPHEN 10 MG/ML
INJECTION, SOLUTION INTRAVENOUS
Status: DISCONTINUED | OUTPATIENT
Start: 2019-12-30 | End: 2019-12-30

## 2019-12-30 RX ORDER — MIDAZOLAM HYDROCHLORIDE 1 MG/ML
INJECTION, SOLUTION INTRAMUSCULAR; INTRAVENOUS
Status: DISCONTINUED | OUTPATIENT
Start: 2019-12-30 | End: 2019-12-30

## 2019-12-30 RX ORDER — OXYCODONE AND ACETAMINOPHEN 5; 325 MG/1; MG/1
1 TABLET ORAL
Status: DISCONTINUED | OUTPATIENT
Start: 2019-12-30 | End: 2019-12-30 | Stop reason: HOSPADM

## 2019-12-30 RX ORDER — KETAMINE HCL IN 0.9 % NACL 50 MG/5 ML
SYRINGE (ML) INTRAVENOUS
Status: DISCONTINUED | OUTPATIENT
Start: 2019-12-30 | End: 2019-12-30

## 2019-12-30 RX ORDER — METRONIDAZOLE 500 MG/1
500 TABLET ORAL EVERY 12 HOURS
Qty: 28 TABLET | Refills: 0 | Status: SHIPPED | OUTPATIENT
Start: 2019-12-30 | End: 2020-01-27

## 2019-12-30 RX ORDER — OXYCODONE HYDROCHLORIDE 5 MG/1
5 TABLET ORAL EVERY 4 HOURS PRN
Qty: 25 TABLET | Refills: 0 | Status: SHIPPED | OUTPATIENT
Start: 2019-12-30 | End: 2020-01-27

## 2019-12-30 RX ORDER — SUCCINYLCHOLINE CHLORIDE 20 MG/ML
INJECTION INTRAMUSCULAR; INTRAVENOUS
Status: DISCONTINUED | OUTPATIENT
Start: 2019-12-30 | End: 2019-12-30

## 2019-12-30 RX ORDER — PROPOFOL 10 MG/ML
VIAL (ML) INTRAVENOUS
Status: DISCONTINUED | OUTPATIENT
Start: 2019-12-30 | End: 2019-12-30

## 2019-12-30 RX ORDER — ONDANSETRON 2 MG/ML
INJECTION INTRAMUSCULAR; INTRAVENOUS
Status: DISCONTINUED | OUTPATIENT
Start: 2019-12-30 | End: 2019-12-30

## 2019-12-30 RX ORDER — LIDOCAINE HYDROCHLORIDE AND EPINEPHRINE 10; 10 MG/ML; UG/ML
INJECTION, SOLUTION INFILTRATION; PERINEURAL
Status: DISCONTINUED | OUTPATIENT
Start: 2019-12-30 | End: 2019-12-30 | Stop reason: HOSPADM

## 2019-12-30 RX ORDER — FENTANYL CITRATE 50 UG/ML
25 INJECTION, SOLUTION INTRAMUSCULAR; INTRAVENOUS EVERY 5 MIN PRN
Status: DISCONTINUED | OUTPATIENT
Start: 2019-12-30 | End: 2019-12-30 | Stop reason: HOSPADM

## 2019-12-30 RX ORDER — ONDANSETRON 2 MG/ML
4 INJECTION INTRAMUSCULAR; INTRAVENOUS DAILY PRN
Status: DISCONTINUED | OUTPATIENT
Start: 2019-12-30 | End: 2019-12-30

## 2019-12-30 RX ORDER — SODIUM CHLORIDE 9 MG/ML
INJECTION, SOLUTION INTRAVENOUS CONTINUOUS
Status: DISCONTINUED | OUTPATIENT
Start: 2019-12-30 | End: 2019-12-30 | Stop reason: HOSPADM

## 2019-12-30 RX ORDER — BUPIVACAINE HYDROCHLORIDE 2.5 MG/ML
INJECTION, SOLUTION EPIDURAL; INFILTRATION; INTRACAUDAL
Status: DISCONTINUED | OUTPATIENT
Start: 2019-12-30 | End: 2019-12-30 | Stop reason: HOSPADM

## 2019-12-30 RX ORDER — OXYCODONE HYDROCHLORIDE 5 MG/1
5 TABLET ORAL EVERY 4 HOURS PRN
Status: DISCONTINUED | OUTPATIENT
Start: 2019-12-30 | End: 2019-12-30 | Stop reason: HOSPADM

## 2019-12-30 RX ORDER — METOCLOPRAMIDE HYDROCHLORIDE 5 MG/ML
10 INJECTION INTRAMUSCULAR; INTRAVENOUS EVERY 10 MIN PRN
Status: DISCONTINUED | OUTPATIENT
Start: 2019-12-30 | End: 2019-12-30 | Stop reason: HOSPADM

## 2019-12-30 RX ORDER — SODIUM CHLORIDE 9 MG/ML
INJECTION, SOLUTION INTRAVENOUS CONTINUOUS
Status: DISCONTINUED | OUTPATIENT
Start: 2019-12-30 | End: 2019-12-30

## 2019-12-30 RX ORDER — ACETAMINOPHEN 325 MG/1
650 TABLET ORAL EVERY 6 HOURS
Qty: 100 TABLET | Refills: 0 | Status: SHIPPED | OUTPATIENT
Start: 2019-12-30 | End: 2020-01-27

## 2019-12-30 RX ORDER — DEXAMETHASONE SODIUM PHOSPHATE 4 MG/ML
INJECTION, SOLUTION INTRA-ARTICULAR; INTRALESIONAL; INTRAMUSCULAR; INTRAVENOUS; SOFT TISSUE
Status: DISCONTINUED | OUTPATIENT
Start: 2019-12-30 | End: 2019-12-30

## 2019-12-30 RX ORDER — BACITRACIN 50000 [IU]/1
INJECTION, POWDER, FOR SOLUTION INTRAMUSCULAR
Status: DISCONTINUED | OUTPATIENT
Start: 2019-12-30 | End: 2019-12-30 | Stop reason: HOSPADM

## 2019-12-30 RX ORDER — FENTANYL CITRATE 50 UG/ML
INJECTION, SOLUTION INTRAMUSCULAR; INTRAVENOUS
Status: DISCONTINUED | OUTPATIENT
Start: 2019-12-30 | End: 2019-12-30

## 2019-12-30 RX ADMIN — DEXAMETHASONE SODIUM PHOSPHATE 8 MG: 4 INJECTION, SOLUTION INTRAMUSCULAR; INTRAVENOUS at 03:12

## 2019-12-30 RX ADMIN — OXYCODONE HYDROCHLORIDE 5 MG: 5 TABLET ORAL at 04:12

## 2019-12-30 RX ADMIN — MORPHINE SULFATE 4 MG: 4 INJECTION INTRAVENOUS at 06:12

## 2019-12-30 RX ADMIN — ACETAMINOPHEN 1000 MG: 10 INJECTION, SOLUTION INTRAVENOUS at 03:12

## 2019-12-30 RX ADMIN — CIPROFLOXACIN 400 MG: 2 INJECTION, SOLUTION INTRAVENOUS at 01:12

## 2019-12-30 RX ADMIN — PROPOFOL 200 MG: 10 INJECTION, EMULSION INTRAVENOUS at 02:12

## 2019-12-30 RX ADMIN — FENTANYL CITRATE 25 MCG: 50 INJECTION, SOLUTION INTRAMUSCULAR; INTRAVENOUS at 04:12

## 2019-12-30 RX ADMIN — MORPHINE SULFATE 4 MG: 4 INJECTION INTRAVENOUS at 10:12

## 2019-12-30 RX ADMIN — MIDAZOLAM HYDROCHLORIDE 2 MG: 1 INJECTION, SOLUTION INTRAMUSCULAR; INTRAVENOUS at 02:12

## 2019-12-30 RX ADMIN — MORPHINE SULFATE 4 MG: 4 INJECTION INTRAVENOUS at 02:12

## 2019-12-30 RX ADMIN — PROPOFOL 50 MG: 10 INJECTION, EMULSION INTRAVENOUS at 03:12

## 2019-12-30 RX ADMIN — CIPROFLOXACIN 400 MG: 2 INJECTION, SOLUTION INTRAVENOUS at 06:12

## 2019-12-30 RX ADMIN — METRONIDAZOLE 500 MG: 500 INJECTION, SOLUTION INTRAVENOUS at 07:12

## 2019-12-30 RX ADMIN — ONDANSETRON 4 MG: 2 INJECTION INTRAMUSCULAR; INTRAVENOUS at 03:12

## 2019-12-30 RX ADMIN — Medication 20 MG: at 03:12

## 2019-12-30 RX ADMIN — FENTANYL CITRATE 25 MCG: 50 INJECTION, SOLUTION INTRAMUSCULAR; INTRAVENOUS at 03:12

## 2019-12-30 RX ADMIN — LIDOCAINE HYDROCHLORIDE 100 MG: 20 INJECTION, SOLUTION INTRAVENOUS at 02:12

## 2019-12-30 RX ADMIN — METRONIDAZOLE 500 MG: 500 INJECTION, SOLUTION INTRAVENOUS at 03:12

## 2019-12-30 RX ADMIN — CIPROFLOXACIN 400 MG: 2 INJECTION, SOLUTION INTRAVENOUS at 08:12

## 2019-12-30 RX ADMIN — SUCCINYLCHOLINE CHLORIDE 200 MG: 20 INJECTION, SOLUTION INTRAMUSCULAR; INTRAVENOUS at 03:12

## 2019-12-30 RX ADMIN — FENTANYL CITRATE 50 MCG: 50 INJECTION, SOLUTION INTRAMUSCULAR; INTRAVENOUS at 03:12

## 2019-12-30 RX ADMIN — SODIUM CHLORIDE: 0.9 INJECTION, SOLUTION INTRAVENOUS at 02:12

## 2019-12-30 NOTE — PLAN OF CARE
Plan of care reviewed with pt. Pt aox4, VS as charted. Purposeful rounding for pt care and safety. Pain controlled with PRN medication. No reports of NV. IV antibiotics given. Bladder scan done due to low urine output, scan showed 900mL, after scan, pt voided 400mL. No falls/injury reported this shift. Skin integrity intact. SCD in place. Safety precautions maintained - bed in low position, call light in reach, side rails up x2.

## 2019-12-30 NOTE — NURSING TRANSFER
Nursing Transfer Note      12/30/2019     Transfer To: 553 [address] From PACU    Transfer via stretcher    Transfer with n/a    Transported by PCT    Medicines sent: none    Chart send with patient: Yes    Notified: spouse    Patient reassessed at: 12/30/19 @ 1713    Upon arrival to floor:

## 2019-12-30 NOTE — DISCHARGE INSTRUCTIONS
Anal Surgery Post Op Instructions:    You had a incision and drainage, seton placement performed today.     Wound care:  Expect some drainage over the next few weeks as the wound heals.  Please wear a pad to help with drainage.     You have no activity restrictions.   No dietary restrictions.    Medications:  A narcotic pain medication has been prescribed.  Please start to wean the pain medication over the following few days.  You can take tylenol instead of the pain medication.      Please take miralax 1 capful at night to prevent constipation associated with narcotic pain medications.      Follow up:  Return to clinic in 2 weeks for follow up and wound check.    RADHA Monterroso MD  Staff Surgeon  Colon & Rectal Surgery

## 2019-12-30 NOTE — NURSING
Pt transported to OR via stretcher by hospital staff. Pt ambulated to stretcher. AA&O x 4, VSS, and on room air. No s/s of distress. PIV with LR infusing at 125ml/hr.

## 2019-12-30 NOTE — NURSING
Bedside report received from ALBA Gould. Pt resting in bed AA&Ox4, VSS, on room air, and PIV x 1 with LR infusing at 125ml/hr. Pt with no s/s of distress or pain at this time. Pt is NPO since midnight. All needs addressed. Safety precautions in place. Call light in reach.

## 2019-12-30 NOTE — PLAN OF CARE
[id number] - [place], [place] - [address]  [address]  [facility] [address]  Phone: [phone] Fax: [phone]    [facility]  [address]  [facility] [address]  Phone: [phone] Fax: [phone]    [id number] - [place], [place] - [address]  [address]  [facility] [address]  Phone: [phone] Fax: [phone]    Payor: BLUE CROSS BLUE SHIELD / Plan: [employer]BS [place] HMO / Product Type: HMO /       12/30/19 1050   Discharge Assessment   Assessment Type Discharge Planning Assessment   Confirmed/corrected address and phone number on facesheet? Yes   Assessment information obtained from? Patient   Expected Length of Stay (days) 1   Communicated expected length of stay with patient/caregiver yes   Prior to hospitilization cognitive status: Alert/Oriented   Prior to hospitalization functional status: Independent   Current cognitive status: Alert/Oriented   Current Functional Status: Independent   Lives With spouse   Able to Return to Prior Arrangements yes   Is patient able to care for self after discharge? Yes   Who are your caregiver(s) and their phone number(s)? Cecilia Matute-Spouse    [phone]   Patient's perception of discharge disposition home or selfcare   Readmission Within the Last 30 Days no previous admission in last 30 days   Patient currently being followed by outpatient case management? No   Patient currently receives any other outside agency services? No   Equipment Currently Used at Home none   Do you have any problems affording any of your prescribed medications? No   Is the patient taking medications as prescribed? yes   Does the patient have transportation home? Yes   Transportation Anticipated family or friend will provide   Dialysis Name and Scheduled days N/A   Does the patient receive services at the Coumadin Clinic? No   Discharge Plan A Home with family    Discharge Plan B Home   DME Needed Upon Discharge  none   Patient/Family in Agreement with Plan yes

## 2019-12-30 NOTE — HPI
35 y/o M with Crohn's well controlled on Humera, presents with a couple days of perianal/ perirectal pain.  Normally has intermittent drainage from the area where he had a prior fistula that has had a seton.  The drainage has stopped for a couple of days and the pain increased.  Has a history of perianal abscess in the past, which was incised and drained with Dr morel with placement of a seton drain in 2017. That drain had subsequently been removed prior to the patient's development of his current symptoms.

## 2019-12-30 NOTE — HOSPITAL COURSE
Mr Matute was admitted to the colorectal surgery service. An MRI confirmed the presence of a perianal abscess and fistula. He was started on antibiotics covering enteric bacteria. He was made NPO after midnight the evening of admission and underwent an incision and drainage and seton placement on hospital day 2. He was feeling well post operatively and was discharged home.

## 2019-12-30 NOTE — TRANSFER OF CARE
"Anesthesia Transfer of Care Note    Patient: Joss Matute    Procedure(s) Performed: Procedure(s) (LRB):  Exam under anesthesia- possible seton (N/A)  INCISION AND DRAINAGE, ABSCESS  INCISION, ABSCESS, PERIRECTAL  PLACEMENT, SETON STITCH (N/A)    Patient location: PACU    Anesthesia Type: general    Transport from OR: Transported from OR on 6-10 L/min O2 by face mask with adequate spontaneous ventilation    Post pain: adequate analgesia    Post assessment: no apparent anesthetic complications and tolerated procedure well    Post vital signs: stable    Level of consciousness: awake and alert    Nausea/Vomiting: no nausea/vomiting    Complications: none    Transfer of care protocol was followed      Last vitals:   Visit Vitals  /83 (BP Location: Left arm, Patient Position: Lying)   Pulse 84   Temp 36.8 °C (98.2 °F) (Oral)   Resp 16   Ht 6' 1" (1.854 m)   Wt 86.2 kg (190 lb)   SpO2 98%   BMI 25.07 kg/m²     "

## 2019-12-30 NOTE — DISCHARGE SUMMARY
[facility]  Colorectal Surgery  Discharge Summary      Patient Name: Joss Matute  MRN: [id number]  Admission Date: 12/29/2019  Hospital Length of Stay: 1 days  Discharge Date and Time:  12/30/2019 5:57 PM  Attending Physician: DENISE Monterroso MD   Discharging Provider: Sid Adorno MD  Primary Care Provider: Primary Doctor No     HPI:  33 y/o M with Crohn's well controlled on Humera, presents with a couple days of perianal/ perirectal pain.  Normally has intermittent drainage from the area where he had a prior fistula that has had a seton.  The drainage has stopped for a couple of days and the pain increased.   Has a history of perianal abscess in the past, which was incised and drained with Dr morel with placement of a seton drain in 2017. That drain had subsequently been removed prior to the patient's development of his current symptoms.        Procedure(s) (LRB):  Exam under anesthesia- possible seton (N/A)  INCISION AND DRAINAGE, ABSCESS  INCISION, ABSCESS, PERIRECTAL  PLACEMENT, SETON STITCH (N/A)     Hospital Course:  Mr Matute was admitted to the colorectal surgery service. An MRI confirmed the presence of a perianal abscess and fistula. He was started on antibiotics covering enteric bacteria. He was made NPO after midnight the evening of admission and underwent an incision and drainage and seton placement on hospital day 2. He was feeling well post operatively and was discharged home.     Consults (From admission, onward)        Status Ordering Provider     Inpatient consult to Colorectal Surgery  Once     Provider:  (Not yet assigned)    EMILY Crawford          Significant Diagnostic Studies: Labs:   BMP:   Recent Labs   Lab 12/29/19  1340 12/30/19  0411   GLU 88 91    139   K 3.9 3.9    103   CO2 27 26   BUN 9 9   CREATININE 0.9 0.8   CALCIUM 9.3 9.1   , CMP   Recent Labs   Lab 12/29/19  1340 12/30/19  0411    139   K 3.9 3.9    103    CO2 27 26   GLU 88 91   BUN 9 9   CREATININE 0.9 0.8   CALCIUM 9.3 9.1   PROT  --  7.1   ALBUMIN  --  3.6   BILITOT  --  0.9   ALKPHOS  --  65   AST  --  16   ALT  --  12   ANIONGAP 9 10   ESTGFRAFRICA >60.0 >60.0   EGFRNONAA >60.0 >60.0    and CBC   Recent Labs   Lab 12/29/19  1340 12/30/19  0411   WBC 16.63* 13.37*   HGB 14.5 13.4*   HCT 44.3 42.1   * 324       Pending Diagnostic Studies:     None        Final Active Diagnoses:    Diagnosis Date Noted POA    PRINCIPAL PROBLEM:  Perirectal abscess [K61.1] 12/29/2019 Yes    Crohn's disease of large intestine with fistula [K50.113] 10/10/2018 Yes    High risk medication use [Z79.899] 01/24/2018 Not Applicable      Problems Resolved During this Admission:      Discharged Condition: good    Disposition: Home or Self Care    Follow Up:  Follow-up Information     H Javier Monterroso MD In 2 weeks.    Specialty:  Colon and Rectal Surgery  Contact information:  [address] [place] [address] [id number] [phone]                 Patient Instructions:      Diet general     Call MD for:  temperature >100.4     Call MD for:  persistent nausea and vomiting     Call MD for:  severe uncontrolled pain     Activity as tolerated     Medications:  Reconciled Home Medications:      Medication List      START taking these medications    acetaminophen 325 MG tablet  Commonly known as:  TYLENOL  Take 2 tablets (650 mg total) by mouth every 6 (six) hours.     ciprofloxacin HCl 500 MG tablet  Commonly known as:  CIPRO  Take 1 tablet (500 mg total) by mouth every 12 (twelve) hours. for 14 days     metroNIDAZOLE 500 MG tablet  Commonly known as:  FLAGYL  Take 1 tablet (500 mg total) by mouth every 12 (twelve) hours.     oxyCODONE 5 MG immediate release tablet  Commonly known as:  ROXICODONE  Take 1 tablet (5 mg total) by mouth every 4 (four) hours as needed for Pain.        CONTINUE taking these medications    Afluria Qd 2019-20(3yr up)(PF) 60 mcg (15 mcg x 4)/0.5 mL Syrg  Generic  drug:  flu vac [id number] 36mos up(PF)  TO BE ADMINISTERED BY PHARMACIST FOR IMMUNIZATION     Humira Pen 40 mg/0.8 mL Pnkt  Inject 0.8 mLs (40 mg total) into the skin every 7 days.            Sid Adorno MD  Colorectal Surgery  [facility]

## 2019-12-30 NOTE — ANESTHESIA PREPROCEDURE EVALUATION
12/29/2019  Joss Matute is a 34 y.o., male.  [facility]  Anesthesia Pre-Operative Evaluation         Patient Name: Joss Matute  YOB: 1985  MRN: [id number]    SUBJECTIVE:     Pre-operative evaluation for Procedure(s) (LRB):  Exam under anesthesia- possible seton (N/A)     12/30/2019    Joss Matute is a 34 y.o. male w/ a significant PMHx of Crohn's well controlled on Humera, presents with a couple days of perianal/ perirectal pain  - Normally has intermittent drainage from the area where he had a prior fistula that has had a seton      LDA:          Peripheral IV - Single Lumen 12/29/19 1338 20 G Right Antecubital (Active)   Site Assessment Clean;Dry;Intact 12/29/2019  8:01 PM   Line Status Infusing 12/29/2019  8:01 PM   Dressing Intervention New dressing 12/29/2019  1:38 PM   Reason Not Rotated Not due 12/29/2019  8:01 PM   Number of days: 0       Prev airway: Airway Device: Endotracheal Tube; Mask Ventilation: Easy; Blade: Bare #2; Airway Device Size: 8.0 Grade I  - Pt had several MAC cases     Drips: None documented    lactated ringers 1,000 mL (12/29/19 1442)       Patient Active Problem List   Diagnosis    Vitamin D deficiency    High risk medication use    Ex-smoker    Penile venereal warts    Crohn's disease of large intestine with fistula    Perirectal abscess       Review of patient's allergies indicates:  No Known Allergies    Current Inpatient Medications:   ciprofloxacin  400 mg Intravenous Q8H    metronidazole  500 mg Intravenous Q8H       No current facility-administered medications on file prior to encounter.      Current Outpatient Medications on File Prior to Encounter   Medication Sig Dispense Refill    adalimumab (HUMIRA) PnKt injection Inject 0.8 mLs (40 mg total) into the skin every 7 days. 4 pen 5    AFLURIA QD 2019-20,3YR UP,,PF, 60  mcg (15 mcg x 4)/0.5 mL Syrg TO BE ADMINISTERED BY PHARMACIST FOR IMMUNIZATION  0       Past Surgical History:   Procedure Laterality Date    ABSCESS DRAINAGE      ANAL FISTULOTOMY      COLONOSCOPY      COLONOSCOPY N/A 2017    Procedure: COLONOSCOPY;  Surgeon: Bubba Spence MD;  Location: [address]);  Service: Endoscopy;  Laterality: N/A;    COLONOSCOPY N/A 10/10/2018    Procedure: COLONOSCOPY;  Surgeon: Bubba Spence MD;  Location: [address]);  Service: Endoscopy;  Laterality: N/A;  schedule as 45 minute case--due 2018    UPPER GASTROINTESTINAL ENDOSCOPY         Social History     Socioeconomic History    Marital status:      Spouse name: Not on file    Number of children: Not on file    Years of education: Not on file    Highest education level: Not on file   Occupational History    Not on file   Social Needs    Financial resource strain: Not on file    Food insecurity:     Worry: Not on file     Inability: Not on file    Transportation needs:     Medical: Not on file     Non-medical: Not on file   Tobacco Use    Smoking status: Former Smoker     Types: Cigarettes     Last attempt to quit: 10/24/2015     Years since quittin.1    Smokeless tobacco: Never Used   Substance and Sexual Activity    Alcohol use: Yes     Comment: 1-2 weekly    Drug use: No    Sexual activity: Yes     Partners: Female     Comment:     Lifestyle    Physical activity:     Days per week: Not on file     Minutes per session: Not on file    Stress: Not on file   Relationships    Social connections:     Talks on phone: Not on file     Gets together: Not on file     Attends Tenriism service: Not on file     Active member of club or organization: Not on file     Attends meetings of clubs or organizations: Not on file     Relationship status: Not on file   Other Topics Concern    Not on file   Social History Narrative     at [employer]        OBJECTIVE:     Vital  Signs Range (Last 24H):  Temp:  [36.6 °C (97.9 °F)-37.6 °C (99.6 °F)]   Pulse:  []   Resp:  [12-18]   BP: (123-171)/(70-88)   SpO2:  [95 %-97 %]       Significant Labs:  Lab Results   Component Value Date    WBC 16.63 (H) 12/29/2019    HGB 14.5 12/29/2019    HCT 44.3 12/29/2019     (H) 12/29/2019    ALT 20 05/15/2019    AST 19 05/15/2019     12/29/2019    K 3.9 12/29/2019     12/29/2019    CREATININE 0.9 12/29/2019    BUN 9 12/29/2019    CO2 27 12/29/2019       Diagnostic Studies: No relevant studies.    EKG:   Previous documented EKG had normal sinus rhythm       2D ECHO:   TTE:  No results found for this or any previous visit.    GLADYS:  No results found for this or any previous visit.    ASSESSMENT/PLAN:       Anesthesia Evaluation    I have reviewed the Patient Summary Reports.     I have reviewed the Medications.     Review of Systems  Anesthesia Hx:  No problems with previous Anesthesia Denies Hx of Anesthetic complications  History of prior surgery of interest to airway management or planning: Previous anesthesia: General, MAC Airway issues documented on chart review include mask, easy, easy direct laryngoscopy , view on direct laryngoscopy Grade I  Denies Family Hx of Anesthesia complications.   Denies Personal Hx of Anesthesia complications.   Social:  Non-Smoker, No Alcohol Use    Hematology/Oncology:         -- Denies Anemia: Denies Current/Recent Cancer   EENT/Dental:   denies chronic allergic rhinitis  Denies Otitis Media Denies Chronic Tonsillitis   Cardiovascular:   Denies Pacemaker.  Denies Hypertension.  Denies MI.  Denies CAD.    Denies CABG/stent.  Denies Dysrhythmias.   Denies Angina.             no hyperlipidemia  Functional Capacity good / => 4 METS    Pulmonary:   Denies Pneumonia Denies COPD.  Denies Asthma.  Denies Shortness of breath.  Denies Recent URI.  Denies Sleep Apnea.    Renal/:   Denies Chronic Renal Disease.     Hepatic/GI:   Denies PUD. Denies Hiatal  Hernia.  Denies GERD.  Denies Hepatitis.    Musculoskeletal:   Denies Arthritis.   Ankylosing Spondylitis    Neurological:   Denies TIA. Denies CVA.  Denies Headaches. Denies Seizures. C-spine cleared.     Denies Chronic Pain Syndrome   Endocrine:   Denies Diabetes. Denies Hypothyroidism. Denies Hyperthyroidism.    Psych:   Denies Psychiatric History.          Physical Exam  General:  Well nourished    Airway/Jaw/Neck:  Airway Findings: Mouth Opening: Normal Tongue: Normal  General Airway Assessment: Adult  Mallampati: II  TM Distance: 4 - 6 cm      Dental:  Dental Findings: In tact   Chest/Lungs:  Chest/Lungs Findings: Clear to auscultation     Heart/Vascular:  Heart Findings: Rate: Normal  Rhythm: Regular Rhythm  Sounds: Normal  Heart murmur: negative    Abdomen:  Abdomen Findings:  Normal, Soft       Mental Status:  Mental Status Findings:  Cooperative, Alert and Oriented         Anesthesia Plan  Type of Anesthesia, risks & benefits discussed:  Anesthesia Type:  general, MAC  Patient's Preference:   Intra-op Monitoring Plan: standard ASA monitors  Intra-op Monitoring Plan Comments:   Post Op Pain Control Plan: multimodal analgesia, IV/PO Opioids PRN and per primary service following discharge from PACU  Post Op Pain Control Plan Comments:   Induction:   IV  Beta Blocker:  Patient is not currently on a Beta-Blocker (No further documentation required).       Informed Consent: Patient understands risks and agrees with Anesthesia plan.  Questions answered. Anesthesia consent signed with patient.  ASA Score: 3     Day of Surgery Review of History & Physical:    H&P update referred to the surgeon.         Ready For Surgery From Anesthesia Perspective.

## 2019-12-30 NOTE — ANESTHESIA PROCEDURE NOTES
Intubation  Performed by: Erica Stephenson CRNA  Authorized by: Tavares Nguyen MD     Intubation:     Induction:  Intravenous    Intubated:  Postinduction    Mask Ventilation:  Moderately difficult with oral airway (two hand mask, beard)    Attempts:  1    Attempted By:  CRNA    Method of Intubation:  Direct    Blade:  Baer 2    Laryngeal View Grade: Grade I - full view of chords      Difficult Airway Encountered?: No      Complications:  None    Airway Device:  Oral endotracheal tube    Airway Device Size:  7.5    Style/Cuff Inflation:  Cuffed    Placement Verified By:  Capnometry    Findings Post-Intubation:  BS equal bilateral

## 2019-12-30 NOTE — OP NOTE
Date of procedure:   December 30, 2019    Indications for procedure:  34-year-old male with history of Crohn's disease and history of prior perirectal abscess was developed acute anal pain and evidence on MRI to have a recurrent abscess involving right posterior and posterior midline areas.  He is brought to the operating room at this time for exam under anesthesia, drainage of abscess.    Preoperative diagnosis:   Severe anal pain, probable recurrent abscess    Postoperative diagnosis:  Same    Name of procedure:  Exam under anesthesia, drainage of perirectal abscess, insertion seton    Surgeon:   DENISE Monterroso MD    Assistant surgeon:  Sid Adorno MD    Type of anesthesia:  General endotracheal    EBL:  15  Cc's    Drains:  Yellow vessel loops were used as non cutting seton    Specimen:  None    Findings:  Minimal amount of purulence noted.  Right ischiorectal fossa as well as deep post anal space were surgically explored.   Intersphincteric space is and left ischiorectal fossa or explored with 18 gauge needle aspiration.  No evidence of undrained sepsis.  There were 2 areas of firm induration along the pelvic floor with out evidence of fluctuance.  These areas were explored with the 18 gauge needle aspiration and there was no evidence of suppuration.    Technique in detail:  Patient was brought to the operating room positive identified and remained on the gurney in the supine position.  He underwent general endotracheal anesthesia without complication.  He was then placed on the operating table in the prone lyndsey-knife position on a Roby frame with his buttock cheeks taped apart.  His perianal skin and buttocks were then clipped of its hair and prepared and draped in usual fashion.  Critical time-out was performed.    On inspection there was no evidence of any visible erythema or significant asymmetry.  There is noted be a chronic skin tag in the right posterior position.  A digital examination with  palpation of indurated areas in the right posterior and left posterior area of the pelvic floor.  There was no palpable fluctuance.  There was noted to be evidence of mild ulceration of the anal canal in the posterior midline without stenosis.   Anal retractor was lubricated and placed into the anal canal.  There were no visible defects of the then the ulceration on mucosa in the posterior midline.  There was no granulation tissue present to suggest an internal opening.  A fistula probe was then used to palpate along the dentate line without evidence of an internal opening.    We then made elliptical incision in the right posterior position where the previous scar was noted and where fluid or pus was identified on the MRI.  This was deepened in the ischiorectal fossa.  Minimal purulence was noted. We explored the ischiorectal fossa with the clamped and again no pockets of pus were noted. This probing was all along the levator plate using a Milagros clamp.  We then made a counter incision over the posterior midline between anus in coccyx.  The incision was deepened through the anococcygeal ligament into the deep post anal space. Again only minimal amounts of purulence were noted.  We were able to dissect along the levator muscle into the ischiorectal fossa on the right side and a yellow vessel loop was placed through this counter incision and was brought out through the right posterior incision and loosely tied to itself to serve as a non cutting seton the wounds were then irrigated with the Pulsavac device.    We then injected all the wounds and perianal skin with local anesthetic.  Dry gauze dressings applied to the anus and held in place with mesh underwear.  The patient was returned to the [facility] in the supine position, awakened from anesthesia, extubated without incident and transported to the recovery area in stable condition.    I was scrubbed and present for the entire operation.    RADHA Monterroso MD

## 2019-12-30 NOTE — BRIEF OP NOTE
[facility]  Brief Operative Note    Surgery Date: 12/30/2019     Surgeon(s) and Role:     * DENISE Monterroso MD - Primary     * Sid Adorno MD - Resident - Assisting        Pre-op Diagnosis:  Perirectal abscess [K61.1]    Post-op Diagnosis:  Post-Op Diagnosis Codes:     * Perirectal abscess [K61.1]    Procedure(s) (LRB):  Exam under anesthesia- possible seton (N/A)  INCISION AND DRAINAGE, ABSCESS  INCISION, ABSCESS, PERIRECTAL  PLACEMENT, SETON STITCH    Anesthesia: General    Description of the findings of the procedure(s): palpable distal rectal nodularity on digital exam, minimal drainable purulence from intersphincteric, ischorectal, and deep postanal spaces. There was a cavity explored in the right ischorectal space, but there was not significant purulence there. Seton placed between posterior midline and right posterior external openings.     Estimated Blood Loss: * No values recorded between 12/30/2019  3:21 PM and 12/30/2019  4:07 PM *         Specimens:   Specimen (12h ago, onward)    None            Discharge Note    OUTCOME: Patient tolerated treatment/procedure well without complication and is now ready for discharge.    DISPOSITION: Home or Self Care    FINAL DIAGNOSIS:  Perirectal abscess    FOLLOWUP: In clinic

## 2019-12-30 NOTE — TELEPHONE ENCOUNTER
Pt called my direct line  He wanted to keep us updated to make sure everyone is on the same page  He is currently in the hospital.  He came last night.  He stated Dr Monterroso is supposed to be doing an exam under anesthesia today for a fistula but he is unsure of the time  He had inquired about what meds to hold and if he should hold them   I placed him on hold to speak with Cecilia  Spoke back with pt and explained as far as holding medication that will be up to CRS  Regardless if he takes his Humira or not he needs to have levels and ab's drawn on 01/02/2020 and the orders are placed and I explained we have him scheduled on 01/27 but we are putting him on the CX list and we will be in touch once we have a sooner appoint   Pt expressed understanding     Labs scheduled 01/02 @ 3

## 2019-12-31 ENCOUNTER — TELEPHONE (OUTPATIENT)
Dept: SURGERY | Facility: CLINIC | Age: 34
End: 2019-12-31

## 2019-12-31 NOTE — PLAN OF CARE
Plan of care reviewed and discussed with patient and spouse. Pt to be discharged home tonight after Cipro IV. Pt alert and oriented x 4, VSS, and no s/s of distress. Discharge instructions given and reviewed with patient. All safety precautions in place. No falls this shift.

## 2019-12-31 NOTE — ANESTHESIA POSTPROCEDURE EVALUATION
Anesthesia Post Evaluation    Patient: Joss Matute    Procedure(s) Performed: Procedure(s) (LRB):  Exam under anesthesia- possible seton (N/A)  INCISION AND DRAINAGE, ABSCESS  INCISION, ABSCESS, PERIRECTAL  PLACEMENT, SETON STITCH (N/A)    Final Anesthesia Type: general    Patient location during evaluation: PACU  Patient participation: Yes- Able to Participate  Level of consciousness: awake and alert and oriented  Post-procedure vital signs: reviewed and stable  Pain management: adequate  Airway patency: patent    PONV status at discharge: No PONV  Anesthetic complications: no      Cardiovascular status: blood pressure returned to baseline  Respiratory status: unassisted  Hydration status: euvolemic  Follow-up not needed.          Vitals Value Taken Time   /70 12/30/2019  5:55 PM   Temp 36.4 °C (97.6 °F) 12/30/2019  5:55 PM   Pulse 91 12/30/2019  5:55 PM   Resp 20 12/30/2019  5:55 PM   SpO2 96 % 12/30/2019  5:55 PM         Event Time     Out of Recovery 12/30/2019 17:15:00          Pain/Ferny Score: Pain Rating Prior to Med Admin: 3 (12/30/2019  4:38 PM)  Pain Rating Post Med Admin: 2 (12/30/2019  5:13 PM)  Ferny Score: 10 (12/30/2019  5:13 PM)

## 2019-12-31 NOTE — NURSING
Pt arrived to room 553A from PACU. Pt alert and oriented x 4, VSS, on room air, and PIV x 1 saline locked. No s/s of distress or pain. Pt with gauze and mesh underwear in place to perirectal for incision and drainage. All safety precautions in place. Call light in reach.

## 2019-12-31 NOTE — NURSING
Discharge instructions and prescriptions given and reviewed with patient and spouse. Awaiting Cipro to finish infusing and then patient can be discharged.

## 2019-12-31 NOTE — TELEPHONE ENCOUNTER
Spoke with patient, instructed patient to soak in warm sitz bath before removing gauze, being careful not to pull on seton.

## 2019-12-31 NOTE — NURSING
Patient discharged via wheelchair. Discharge instructions explained and given to patient. Patient's IV removed before discharge. Patient was accompanied by wife.

## 2019-12-31 NOTE — PLAN OF CARE
Future Appointments   Date Time Provider Department Center   1/2/2020  3:00 PM LAB, SAME DAY [facility]   1/27/2020  8:20 AM Bubba Spence MD [facility] GASTRO [facility]        12/31/19 0805   Final Note   Assessment Type Final Discharge Note   Anticipated Discharge Disposition Home   What phone number can be called within the next 1-3 days to see how you are doing after discharge? [phone]   Hospital Follow Up  Appt(s) scheduled? Yes   Discharge plans and expectations educations in teach back method with documentation complete? Yes   Right Care Referral Info   Post Acute Recommendation No Care

## 2020-01-02 ENCOUNTER — LAB VISIT (OUTPATIENT)
Dept: LAB | Facility: HOSPITAL | Age: 35
End: 2020-01-02
Payer: COMMERCIAL

## 2020-01-02 ENCOUNTER — TELEPHONE (OUTPATIENT)
Dept: GASTROENTEROLOGY | Facility: HOSPITAL | Age: 35
End: 2020-01-02

## 2020-01-02 DIAGNOSIS — K50.919 CROHN'S DISEASE WITH COMPLICATION, UNSPECIFIED GASTROINTESTINAL TRACT LOCATION: ICD-10-CM

## 2020-01-02 PROCEDURE — 80145 DRUG ASSAY ADALIMUMAB: CPT

## 2020-01-02 PROCEDURE — 36415 COLL VENOUS BLD VENIPUNCTURE: CPT

## 2020-01-02 NOTE — TELEPHONE ENCOUNTER
Called to check in on patient and symptoms. Reports he's doing well. Able to ambulate. No fever/chills. Tolerating diet well. Still taking antibiotics (2 week course).  - gave humira level sample today  - has concerns about going back to work due to drainage. Wearing pad for drainage with ambulating. Working in restaurant management. Thinks drainage is decreasing. Would not go back before Tuesday.  - no follow-up appointment scheduled yet with CRS  - due for humira dose tomorrow. Discussed awaiting to speak with crs more about whether they're ok with him resuming humira while on antibiotic. For now recommend he hold the humira pending completion of antibiotic and I will reach back out tomorrow with more instructions.  - questions answered.

## 2020-01-03 ENCOUNTER — TELEPHONE (OUTPATIENT)
Dept: GASTROENTEROLOGY | Facility: HOSPITAL | Age: 35
End: 2020-01-03

## 2020-01-03 NOTE — TELEPHONE ENCOUNTER
CRS recommended completion of 1 week of antibiotics prior to resumption of humira.  - I&D performed on 12/30  - to resume humira on Monday 1/6  - discussed with patient, questions answered.

## 2020-01-10 LAB — ADALIMUMAB CONCENTRATION & ANTI-ADALIMUMAB ANTIBODY: NORMAL

## 2020-01-15 ENCOUNTER — TELEPHONE (OUTPATIENT)
Dept: PHARMACY | Facility: CLINIC | Age: 35
End: 2020-01-15

## 2020-01-27 ENCOUNTER — TELEPHONE (OUTPATIENT)
Dept: PHARMACY | Facility: CLINIC | Age: 35
End: 2020-01-27

## 2020-01-27 ENCOUNTER — LAB VISIT (OUTPATIENT)
Dept: LAB | Facility: HOSPITAL | Age: 35
End: 2020-01-27
Attending: INTERNAL MEDICINE
Payer: COMMERCIAL

## 2020-01-27 ENCOUNTER — TELEPHONE (OUTPATIENT)
Dept: ENDOSCOPY | Facility: HOSPITAL | Age: 35
End: 2020-01-27

## 2020-01-27 ENCOUNTER — OFFICE VISIT (OUTPATIENT)
Dept: GASTROENTEROLOGY | Facility: CLINIC | Age: 35
End: 2020-01-27
Payer: COMMERCIAL

## 2020-01-27 VITALS
HEART RATE: 79 BPM | WEIGHT: 192 LBS | TEMPERATURE: 98 F | DIASTOLIC BLOOD PRESSURE: 75 MMHG | SYSTOLIC BLOOD PRESSURE: 106 MMHG | BODY MASS INDEX: 25.45 KG/M2 | HEIGHT: 73 IN | OXYGEN SATURATION: 99 %

## 2020-01-27 DIAGNOSIS — D84.9 IMMUNOSUPPRESSED STATUS: ICD-10-CM

## 2020-01-27 DIAGNOSIS — K61.1 PERIRECTAL ABSCESS: ICD-10-CM

## 2020-01-27 DIAGNOSIS — K50.113 CROHN'S DISEASE OF LARGE INTESTINE WITH FISTULA: Primary | ICD-10-CM

## 2020-01-27 DIAGNOSIS — K50.113 CROHN'S DISEASE OF LARGE INTESTINE WITH FISTULA: ICD-10-CM

## 2020-01-27 DIAGNOSIS — Z12.11 SPECIAL SCREENING FOR MALIGNANT NEOPLASMS, COLON: Primary | ICD-10-CM

## 2020-01-27 PROCEDURE — 99215 PR OFFICE/OUTPT VISIT, EST, LEVL V, 40-54 MIN: ICD-10-PCS | Mod: S$GLB,,, | Performed by: INTERNAL MEDICINE

## 2020-01-27 PROCEDURE — 99999 PR PBB SHADOW E&M-EST. PATIENT-LVL V: CPT | Mod: PBBFAC,,, | Performed by: INTERNAL MEDICINE

## 2020-01-27 PROCEDURE — 86480 TB TEST CELL IMMUN MEASURE: CPT

## 2020-01-27 PROCEDURE — 99215 OFFICE O/P EST HI 40 MIN: CPT | Mod: S$GLB,,, | Performed by: INTERNAL MEDICINE

## 2020-01-27 PROCEDURE — 3008F PR BODY MASS INDEX (BMI) DOCUMENTED: ICD-10-PCS | Mod: CPTII,S$GLB,, | Performed by: INTERNAL MEDICINE

## 2020-01-27 PROCEDURE — 3008F BODY MASS INDEX DOCD: CPT | Mod: CPTII,S$GLB,, | Performed by: INTERNAL MEDICINE

## 2020-01-27 PROCEDURE — 99999 PR PBB SHADOW E&M-EST. PATIENT-LVL V: ICD-10-PCS | Mod: PBBFAC,,, | Performed by: INTERNAL MEDICINE

## 2020-01-27 RX ORDER — POLYETHYLENE GLYCOL 3350, SODIUM SULFATE ANHYDROUS, SODIUM BICARBONATE, SODIUM CHLORIDE, POTASSIUM CHLORIDE 236; 22.74; 6.74; 5.86; 2.97 G/4L; G/4L; G/4L; G/4L; G/4L
4 POWDER, FOR SOLUTION ORAL ONCE
Qty: 4000 ML | Refills: 0 | Status: SHIPPED | OUTPATIENT
Start: 2020-01-27 | End: 2020-01-27

## 2020-01-27 NOTE — PATIENT INSTRUCTIONS
Instructions:  - labs today- CBC/CRP/TB test  - colonoscopy and same day MRE in 3/2020- schedule today  - continue humira weekly  - start vit D 1000 IU/d  - start vit B12 1000 mcg/day sublingual or liquid version  - schedule pt for dermatology visit  - give shingrix vaccine script  - this Saturday take 2 injections of humira and then resume humira every week on saturdays- let me know anytime you get sick   - no raw seafood  - no NSAIDs- tylenol okay  - follow up 2-3 weeks after colonoscopy/MRE

## 2020-01-27 NOTE — PROGRESS NOTES
"     [facility]             Inflammatory Bowel Disease Follow-up  Note              TODAY'S VISIT DATE:  1/27/2020    Chief Complaint:   Chief Complaint   Patient presents with    Crohn's Disease     PCP: Primary Doctor No    Previous History:  Joss Matute is a 34 y.o. male with Crohn's disease (ileum, rectal ulcer, recurrent perianal fistula/abscess- 12/96, 3/2017, 12/2019).  He began with symptoms of a perirectal abscess in December 1996 that was initially drained in the ED.  The abscess recurred in December 1997 and was associated with vomiting, weight loss, and abdominal pain.  His initial colonoscopy in 1998 (no actual record just reported in clinic note) showed moderately severe ileitis with biopsies of the right/transverse/left colon and rectum with mild nonspecific acute and chronic reactive changes.  The transverse colon polyp (likely removed) pathology showed it to be "edematous with mild glandular dysplasia."  At that time he was finally diagnosed with Crohn's disease but unsure of the exact location.  Initially he recalls being given prednisone for 4-5 months and pentasa for maintenance.  By 2002, age 17, he stopped all medications and did not have any GI follow up.  He had a hospital admission for a "flare" with dehydration in 2004 and again was not started on any medications.  He was seen in 2007 for RLQ abdominal cramping, increasing fatigue, and increased bowel frequency with blood and mucous.  He had a repeat colonoscopy in 2/2007 that showed patchy area of mucosa in the distal 4 cm of the TI with normal biopsies.  He had recurrent abscess/fistula in 2008 that spontaneously drained and was seen by CRS and did not wish to take any medications.  He reports intermittent abscess/perianal fistula from [id number] with chronic drainage that would cause he to intermittently seek treatment of I&D in the ER.  In 10/2016 he established care with Dr. Bryson when he had a recurrent " perianal fistula who planned to start patient on humira after surgical intervention.  He was seen by Dr. Cason on 3/15/2017 and had an EUA with seton placement.  He most recent colonoscopy on 4/7/2017 performed by Dr. LEANDRO Spence showed showed perianal skin tag, seton intact and small rectal ulcer with biopsies consistent with patchy active colitis with features of chronic mucosal injury.  He started Humira on 5/1/2017.  Setons were removed by Dr. Cason in 7/2017.  He started oral MTX in 8/2017 for immunogenicity prevention.  Trough [facility] humira levels were 2.5 with ABs 57 (low) on 1/8/2018 so we planned to increase humira to 40 mg SC weekly and start oral MTX 12.5 mg PO weekly with daily folic acid.  He started humira 40 mg SC every 7 days on 2/6/2018.  Repeat trough [facility] Humira levels were 15 with low ABs of 30 on 5/17/2018.  He had elevated LFTs on 8/13/2018 so oral MTX was discontinued.  Colonoscopy on 10/10/2018 showed a small perianal skin tar, rectal fistula, other normal rectum, colon, and ileum with normal biopsies.  Trough [facility] Humira level 20 with low ABs 26 on 11/16/2018 on humira 40 mg SC every 7 days.  Patient was seen by Dr. Monterroso to assess his fistula and discuss repair on 3/13/2019.  MRI on 3/20/2019 ordered by Dr. Monterroso showed three perianal fistulas with 2 appeared to have blind endings and one extending into the skin surface at the right medial gluteal fold.  On 5/13/2019 patient had some extreme diarrhea and abdominal pain last for a few hours and then resolved.    Interval History:  - current IBD meds: Humira 40 mg SC every 7 days (started 5/1/17, increased to weekly 2/6/18, skipped 1/3/20 dose due to abscess, resumed humira on 1/11/20 instead of 1/10/20 and then delayed last dose   - 3 formed Bowel Movements/day with no blood, urgency, nocturnal bowel movements  - 12/29/19-12/30/19 Perianal drainage and rectal pain- started on cipro/flagyl  - NSAID use: No  - Narcotic use: No  -  "Alternative/complementary meds for IBD: No     Prior Pertinent Surgeries:   previous fistula repair Xs 2  3/15/2017: EUA with seton placement    Pertinent Endoscopy/Imagin Colonoscopy: (per clinic note path only) showed moderately severe ileitis; right, transverse, left, and rectum showed mild non-specific acute and chronic reactive changes; " transverse polyp path showed it to be edematous with mild glandular dysplasia"  2007 Colonoscopy: patchy area of mucosa in the ileum was nodular and granular (most distal 4 cm of TI) (path: normal); moderate amount of semisolid stool in the entire colon  2012 SBFT: normal  2017 Colonoscopy: fistula in the mauricio-anal area with no active drainage; normal mucosa in the whole colon and TI (no biopsies)  2017 MRE: no definitive evidence for abnormal mucosal enhancement within the small bowel; suspicious early fistulous tract/fissure within the distal rectum/perianal region tracking along the 9 oclock position rotated into the 6 oclock position and exiting along the anal canal/fissure; no evidence for abnormal enhancing structures within the abdomen/pelvis  2017 Colonoscopy: perianal skin tag; small rectal ulcer in the rectum (seen on retroflexion) (path: ulcer and adjacent patchy active colitis and features of chronic mucosal injury), remainder colon normal (path: normal); normal TI (path: normal); seton intact   3/20/2019 MRI pelvis: Three perianal fistulous, two appear blind ending and one of which extends to the skin surface at the right medial gluteal fold.  No evidence for abscess    Pertinent Labs:  Lab Results   Component Value Date    SEDRATE 2 2017    CRP 0.8 2017     Lab Results   Component Value Date    TTGIGA 7 2017     2017     No results found for: TSH, FREET4  Lab Results   Component Value Date    KQFBZSLW20VB 26 (L) 05/15/2019    EXVMAENA27 296 05/15/2019     Lab Results   Component Value Date    " HEPBSAG Negative 05/15/2019    HEPBCAB Negative 05/15/2019     No results found for: ANA50RRNR  Lab Results   Component Value Date    TSPOTSCREN See result image under hyperlink 11/29/2018     Lab Results   Component Value Date    TPTMINTERP SEE BELOW 04/24/2017     Therapeutic Drug Monitoring Labs:  1/8/2018: Trough [facility] ADA Levels 2.5, ABs 57 (low) (humira 40 mg SC every 14 days)  5/17/2018: [facility] Trough Humira Level 15, ABs 30 (low) (humira 40 mg SC every 7 days)  11/16/2018: Trough [facility] Humira Level 20, ABs 26 (low) (humira 40 mg SC every 7 days)    Prior IBD Meds:  Prednisone  Pentasa  MTX 12.5 mg PO weekly (started 8/2017, stopped 8/13/2018 due to elevated LFTs)    Vaccinations:  Lab Results   Component Value Date    HEPBSAB Positive (A) 08/13/2018     Lab Results   Component Value Date    HEPAIGG Positive (A) 08/13/2018     Lab Results   Component Value Date    VARICELLAZOS 2.98 (H) 04/24/2017    VARICELLAINT Positive (A) 04/24/2017     Immunization History   Administered Date(s) Administered    Hepatitis A / Hepatitis B 01/25/2018, 02/22/2018, 07/26/2018    Pneumococcal Conjugate - 13 Valent 04/24/2017    Pneumococcal Polysaccharide - 23 Valent 07/25/2017    Tdap 04/24/2017     Influenza (inactive): 2019    Pneumococcal PCV 13: 4/24/17  Pneumococcal PCV 23: 7/25/17  Tetanus (TdaP): 4/24/17  HPV (males and females ages 19-25 yo):      Meningococcal:  UTD with childhood vaccines  Hepatitis A/B:  1/25/18, 2/22/18, 7/26/18, immune  MMR (live vaccine): UTD with childhood vaccines  Chickenpox status/Varicella (live vaccine): immune  Shingrix: recommended, script given today    Review of Systems   Constitutional: Negative for chills, fever and weight loss.   Eyes: Negative for pain and redness.   Respiratory: Negative for cough and shortness of breath.    Cardiovascular: Negative for chest pain.   Gastrointestinal: Negative for abdominal pain, heartburn, nausea and vomiting.   Genitourinary: Negative  "for hematuria.   Musculoskeletal: Negative for back pain.   Skin: Negative for rash.   Psychiatric/Behavioral: Negative for depression. The patient is not nervous/anxious.      All Medical History/Surgical History/Family History/Social History/Allergies have been reviewed and updated in EMR    Review of patient's allergies indicates:  No Known Allergies    Outpatient Medications Marked as Taking for the 1/27/20 encounter (Office Visit) with Bubba Spence MD   Medication Sig Dispense Refill    adalimumab (HUMIRA) PnKt injection Inject 0.8 mLs (40 mg total) into the skin every 7 days. 4 pen 5     Vital Signs:  /75 (BP Location: Left arm, Patient Position: Sitting)   Pulse 79   Temp 98.1 °F (36.7 °C)   Ht 6' 1" (1.854 m)   Wt 87.1 kg (192 lb 0.3 oz)   SpO2 99%   BMI 25.33 kg/m²      Physical Exam   Constitutional: He is oriented to person, place, and time. He appears well-developed.   HENT:   Mouth/Throat: Oropharynx is clear and moist. No oral lesions.   Eyes: Pupils are equal, round, and reactive to light. Conjunctivae are normal.   Cardiovascular: Normal rate and regular rhythm.   Pulmonary/Chest: Effort normal and breath sounds normal.   Abdominal: Soft. There is no tenderness.   Genitourinary:   Genitourinary Comments: Perianal fistula with seton intact with granulation tissue near seton placement, small perianal skin tags   Neurological: He is alert and oriented to person, place, and time.   Skin: No rash noted.   Psychiatric: He has a normal mood and affect.   Nursing note and vitals reviewed.    Labs:   Lab Results   Component Value Date    WBC 13.37 (H) 12/30/2019    HGB 13.4 (L) 12/30/2019    HCT 42.1 12/30/2019    MCV 94 12/30/2019     12/30/2019     Lab Results   Component Value Date    CREATININE 0.8 12/30/2019    ALBUMIN 3.6 12/30/2019    BILITOT 0.9 12/30/2019    ALKPHOS 65 12/30/2019    AST 16 12/30/2019    ALT 12 12/30/2019       Assessment/Plan:  Joss Matute is a 34 y.o. male " with Crohn's disease (ileum, rectal ulcer, recurrent perianal fistula/abscess- 12/96, 3/2017, 12/2019) who continues on Humira 40 mg SC weekly with recent trough levels on 1/2/2020 of 13 with low very low antibodies of 29.  Patient had a perianal abscess recur in December 2019 requiring drainage with seton placement.  His previous drug levels were higher at 20 versus 13 and it is hard to know whether this is why he had a recurrence of an abscess though the fistulas have never completely close since start of Humira treatment.  At this time I would like to re-stage his disease with a colonoscopy and then determine next steps.  He has not been able to take methotrexate in the past due to elevated liver function test caused by methotrexate and I am hesitant to add an immunomodulator Humira given risk of lymphoma.  He is naive to most other medical treatments    # Crohn's disease (ileum, rectal ulcer, recurrent perianal fistula/abscess- 12/96, 3/2017, 12/2019)  - continue Humira 40 mg SC every weekly  - colonoscopy 3/2020  - MRE same day as colonoscopy 3/2020; I don't think MRI pelvis will be helpful at this time  - continue with seton for now and don't have it removed until we restage disease and see if any therapy needs to be optimized  - vitamin B12 (5/15/19 vit B12 296)- start vit B12 1000 mcg daily  - ex-smoker:  Quit fall 2015, discussed risks of smoking and CD with patient in past  - CRC Risk: personal h/o of sporadic adenoma in 1998, proctitis/ileal disease with symptom onset in 1998; surveillance every 5 years   - drug monitoring labs: CBC/CMP every 6 months (reviewed 12/2019), TPMT (normal 4/2017), TB test (T spot negative 11/2018, never had abnormal TB quantiferon so will oder TB quantiferon today), Hep B testing (HBsAg, HBcAb negative 5/2019)  - TDM:  Trough humira levels/abs- next repeat to be decided    # IBD specific health maintenance:  Skin exam yearly - never had, referred to dermatology today   Risk  for osteopenia/osteoporosis-none  Vitamin D- start vit D3 1000 IU/day  Vaccines: no live vaccines, shingrix script given to patient    Follow up: 2-3 weeks after colonoscopy/MRE    Total visit time was 40 minutes, more than 50% of which was spent in face-to-face counseling with patient regarding evaluation and management goals and treatment options for Crohn's disease     Bubba Spence MD  Department of Gastroenterology  Medical Director, Inflammatory Bowel Disease

## 2020-01-28 LAB
GAMMA INTERFERON BACKGROUND BLD IA-ACNC: 0.05 IU/ML
M TB IFN-G CD4+ BCKGRND COR BLD-ACNC: 0.01 IU/ML
MITOGEN IGNF BCKGRD COR BLD-ACNC: 6.52 IU/ML
TB GOLD PLUS: NEGATIVE
TB2 - NIL: 0 IU/ML

## 2020-02-19 ENCOUNTER — TELEPHONE (OUTPATIENT)
Dept: GASTROENTEROLOGY | Facility: CLINIC | Age: 35
End: 2020-02-19

## 2020-02-19 NOTE — TELEPHONE ENCOUNTER
Saw pts number on my caller ID but no message was sent   Called pt to check on him   He stated he called us by mistake

## 2020-02-21 ENCOUNTER — TELEPHONE (OUTPATIENT)
Dept: PHARMACY | Facility: CLINIC | Age: 35
End: 2020-02-21

## 2020-02-24 ENCOUNTER — TELEPHONE (OUTPATIENT)
Dept: GASTROENTEROLOGY | Facility: CLINIC | Age: 35
End: 2020-02-24

## 2020-02-24 NOTE — TELEPHONE ENCOUNTER
confirmed the need for refill of humira, for  on 2/24.verified 2 pt. identifier, pt. states has 0 dose(s) on hand for 2/24, pt missed dose date on 2/22 due to the need of a prior auth. pt understands and agrees to consult with a RPH at  on 2/24, and pt's next need by dose is next week.

## 2020-02-24 NOTE — TELEPHONE ENCOUNTER
Pt called my direct line  He stated he is late on his Humira  He was due Sat 02/22.  [facility] is [facility] today to see if he can come and pick it up   He will keep us posted

## 2020-02-26 ENCOUNTER — TELEPHONE (OUTPATIENT)
Dept: GASTROENTEROLOGY | Facility: CLINIC | Age: 35
End: 2020-02-26

## 2020-02-26 NOTE — TELEPHONE ENCOUNTER
----- Message from Norma Lunsford MA sent at 2/24/2020 10:55 AM CST -----  Has pt sent portal letting us know when he received and ijected Humira    Delayed  Was due 02/22    BRETT Matute

## 2020-02-28 ENCOUNTER — PATIENT MESSAGE (OUTPATIENT)
Dept: GASTROENTEROLOGY | Facility: CLINIC | Age: 35
End: 2020-02-28

## 2020-03-10 ENCOUNTER — ANESTHESIA (OUTPATIENT)
Dept: ENDOSCOPY | Facility: HOSPITAL | Age: 35
End: 2020-03-10
Payer: COMMERCIAL

## 2020-03-10 ENCOUNTER — HOSPITAL ENCOUNTER (OUTPATIENT)
Dept: RADIOLOGY | Facility: HOSPITAL | Age: 35
Discharge: HOME | End: 2020-03-10
Attending: INTERNAL MEDICINE | Admitting: INTERNAL MEDICINE
Payer: COMMERCIAL

## 2020-03-10 ENCOUNTER — HOSPITAL ENCOUNTER (OUTPATIENT)
Facility: HOSPITAL | Age: 35
Discharge: HOME | End: 2020-03-10
Attending: INTERNAL MEDICINE | Admitting: INTERNAL MEDICINE
Payer: COMMERCIAL

## 2020-03-10 ENCOUNTER — ANESTHESIA EVENT (OUTPATIENT)
Dept: ENDOSCOPY | Facility: HOSPITAL | Age: 35
End: 2020-03-10
Payer: COMMERCIAL

## 2020-03-10 VITALS
SYSTOLIC BLOOD PRESSURE: 127 MMHG | RESPIRATION RATE: 16 BRPM | BODY MASS INDEX: 24.52 KG/M2 | DIASTOLIC BLOOD PRESSURE: 78 MMHG | HEART RATE: 68 BPM | WEIGHT: 185 LBS | OXYGEN SATURATION: 100 % | TEMPERATURE: 99 F | HEIGHT: 73 IN

## 2020-03-10 DIAGNOSIS — K50.113 CROHN'S DISEASE OF LARGE INTESTINE WITH FISTULA: ICD-10-CM

## 2020-03-10 DIAGNOSIS — K61.1 PERIRECTAL ABSCESS: ICD-10-CM

## 2020-03-10 DIAGNOSIS — K50.113 CROHN'S DISEASE OF LARGE INTESTINE WITH FISTULA: Primary | ICD-10-CM

## 2020-03-10 DIAGNOSIS — D84.9 IMMUNOSUPPRESSED STATUS: ICD-10-CM

## 2020-03-10 PROCEDURE — 72197 MRI PELVIS W/O & W/DYE: CPT | Mod: TC

## 2020-03-10 PROCEDURE — 27201012 HC FORCEPS, HOT/COLD, DISP: Performed by: INTERNAL MEDICINE

## 2020-03-10 PROCEDURE — 88305 TISSUE EXAM BY PATHOLOGIST: CPT | Mod: 26,,, | Performed by: PATHOLOGY

## 2020-03-10 PROCEDURE — 72197 MRI PELVIS W/O & W/DYE: CPT | Mod: 26,,, | Performed by: RADIOLOGY

## 2020-03-10 PROCEDURE — 37000009 HC ANESTHESIA EA ADD 15 MINS: Performed by: INTERNAL MEDICINE

## 2020-03-10 PROCEDURE — 72197 MRI ENTEROGRAPHY: ICD-10-PCS | Mod: 26,,, | Performed by: RADIOLOGY

## 2020-03-10 PROCEDURE — 45380 COLONOSCOPY AND BIOPSY: CPT | Mod: ,,, | Performed by: INTERNAL MEDICINE

## 2020-03-10 PROCEDURE — 45380 PR COLONOSCOPY,BIOPSY: ICD-10-PCS | Mod: ,,, | Performed by: INTERNAL MEDICINE

## 2020-03-10 PROCEDURE — 74183 MRI ABD W/O CNTR FLWD CNTR: CPT | Mod: 26,,, | Performed by: RADIOLOGY

## 2020-03-10 PROCEDURE — A9585 GADOBUTROL INJECTION: HCPCS | Performed by: INTERNAL MEDICINE

## 2020-03-10 PROCEDURE — 88305 TISSUE EXAM BY PATHOLOGIST: ICD-10-PCS | Mod: 26,,, | Performed by: PATHOLOGY

## 2020-03-10 PROCEDURE — 63600175 PHARM REV CODE 636 W HCPCS: Performed by: NURSE ANESTHETIST, CERTIFIED REGISTERED

## 2020-03-10 PROCEDURE — 37000008 HC ANESTHESIA 1ST 15 MINUTES: Performed by: INTERNAL MEDICINE

## 2020-03-10 PROCEDURE — E9220 PRA ENDO ANESTHESIA: HCPCS | Mod: ,,, | Performed by: NURSE ANESTHETIST, CERTIFIED REGISTERED

## 2020-03-10 PROCEDURE — 45380 COLONOSCOPY AND BIOPSY: CPT | Performed by: INTERNAL MEDICINE

## 2020-03-10 PROCEDURE — 88305 TISSUE EXAM BY PATHOLOGIST: CPT | Performed by: PATHOLOGY

## 2020-03-10 PROCEDURE — 25500020 PHARM REV CODE 255: Performed by: INTERNAL MEDICINE

## 2020-03-10 PROCEDURE — 63600175 PHARM REV CODE 636 W HCPCS: Performed by: INTERNAL MEDICINE

## 2020-03-10 PROCEDURE — E9220 PRA ENDO ANESTHESIA: ICD-10-PCS | Mod: ,,, | Performed by: NURSE ANESTHETIST, CERTIFIED REGISTERED

## 2020-03-10 PROCEDURE — 74183 MRI ENTEROGRAPHY: ICD-10-PCS | Mod: 26,,, | Performed by: RADIOLOGY

## 2020-03-10 RX ORDER — PROPOFOL 10 MG/ML
VIAL (ML) INTRAVENOUS
Status: DISCONTINUED | OUTPATIENT
Start: 2020-03-10 | End: 2020-03-10

## 2020-03-10 RX ORDER — LIDOCAINE HCL/PF 100 MG/5ML
SYRINGE (ML) INTRAVENOUS
Status: DISCONTINUED | OUTPATIENT
Start: 2020-03-10 | End: 2020-03-10

## 2020-03-10 RX ORDER — GADOBUTROL 604.72 MG/ML
10 INJECTION INTRAVENOUS
Status: COMPLETED | OUTPATIENT
Start: 2020-03-10 | End: 2020-03-10

## 2020-03-10 RX ORDER — PROPOFOL 10 MG/ML
VIAL (ML) INTRAVENOUS CONTINUOUS PRN
Status: DISCONTINUED | OUTPATIENT
Start: 2020-03-10 | End: 2020-03-10

## 2020-03-10 RX ORDER — SODIUM CHLORIDE 9 MG/ML
INJECTION, SOLUTION INTRAVENOUS CONTINUOUS
Status: DISCONTINUED | OUTPATIENT
Start: 2020-03-10 | End: 2020-03-10 | Stop reason: HOSPADM

## 2020-03-10 RX ADMIN — PROPOFOL 50 MG: 10 INJECTION, EMULSION INTRAVENOUS at 02:03

## 2020-03-10 RX ADMIN — PROPOFOL 30 MG: 10 INJECTION, EMULSION INTRAVENOUS at 02:03

## 2020-03-10 RX ADMIN — GADOBUTROL 10 ML: 604.72 INJECTION INTRAVENOUS at 06:03

## 2020-03-10 RX ADMIN — PROPOFOL 20 MG: 10 INJECTION, EMULSION INTRAVENOUS at 02:03

## 2020-03-10 RX ADMIN — PROPOFOL 200 MCG/KG/MIN: 10 INJECTION, EMULSION INTRAVENOUS at 02:03

## 2020-03-10 RX ADMIN — SODIUM CHLORIDE: 0.9 INJECTION, SOLUTION INTRAVENOUS at 02:03

## 2020-03-10 RX ADMIN — Medication 60 MG: at 02:03

## 2020-03-10 NOTE — H&P
Short Stay Endoscopy History and Physical    PCP - Primary Doctor No  Referring Physician - Bubba Spence MD  [id number] [place] [address]    Procedure - Colonoscopy  ASA - per anesthesia  Mallampati - per anesthesia  History of Anesthesia problems - no  Family history Anesthesia problems -  no   Plan of anesthesia - General    HPI  34 y.o. male  Reason for procedure:   Crohn's ileum/rectum.  History of colon adenoma    ROS:  Constitutional: No fevers, chills, No weight loss  CV: No chest pain  Pulm: No cough, No shortness of breath  GI: see HPI    Medical History:  has a past medical history of Crohn's disease of large intestine with fistula (10/10/2018), Ex-smoker (1/24/2018), Immunosuppressed status (1/27/2020), Penile venereal warts (1/25/2018), and Vitamin D deficiency (6/26/2017).    Surgical History:  has a past surgical history that includes Colonoscopy; Colonoscopy (N/A, 4/7/2017); Abscess drainage; Anal fistulotomy; Upper gastrointestinal endoscopy; Colonoscopy (N/A, 10/10/2018); Examination under anesthesia (N/A, 12/30/2019); Incision and drainage of abscess (12/30/2019); Incision of perirectal abscess (12/30/2019); and Insertion of seton stitch (N/A, 12/30/2019).    Family History: family history includes ALS in his paternal grandfather.. Otherwise no colon cancer, inflammatory bowel disease, or GI malignancies.    Social History:  reports that he quit smoking about 4 years ago. His smoking use included cigarettes. He has never used smokeless tobacco. He reports that he drinks alcohol. He reports that he does not use drugs.    Review of patient's allergies indicates:  No Known Allergies    Medications:   Medications Prior to Admission   Medication Sig Dispense Refill Last Dose    adalimumab (HUMIRA) PnKt injection Inject 0.8 mLs (40 mg total) into the skin every 7 days. 4 pen 5 Taking       Physical Exam:    Vital Signs: There were no vitals filed for this visit.    General  Appearance: Well appearing in no acute distress  Abdomen: Soft, non tender, non distended with normal bowel sounds, no masses    Labs:  Lab Results   Component Value Date    WBC 7.07 01/27/2020    HGB 13.7 (L) 01/27/2020    HCT 43.6 01/27/2020     (H) 01/27/2020    ALT 12 12/30/2019    AST 16 12/30/2019     12/30/2019    K 3.9 12/30/2019     12/30/2019    CREATININE 0.8 12/30/2019    BUN 9 12/30/2019    CO2 26 12/30/2019       I have explained the risks and benefits of this endoscopic procedure to the patient including but not limited to bleeding, inflammation, infection, perforation, and death.      Bubba Spence MD

## 2020-03-10 NOTE — TRANSFER OF CARE
"Anesthesia Transfer of Care Note    Patient: Joss Matute    Procedure(s) Performed: Procedure(s) (LRB):  COLONOSCOPY (N/A)    Patient location: GI    Anesthesia Type: general    Transport from OR: Transported from OR on room air with adequate spontaneous ventilation    Post pain: adequate analgesia    Post assessment: no apparent anesthetic complications and tolerated procedure well    Post vital signs: stable    Level of consciousness: alert, oriented and awake    Nausea/Vomiting: no nausea/vomiting    Complications: none    Transfer of care protocol was followed      Last vitals:   Visit Vitals  /61 (BP Location: Left arm, Patient Position: Sitting)   Pulse 88   Temp 37 °C (98.6 °F) (Temporal)   Resp 16   Ht 6' 1" (1.854 m)   Wt 83.9 kg (185 lb)   SpO2 100%   BMI 24.41 kg/m²     "

## 2020-03-10 NOTE — [employer] PATIENT INSTRUCTIONS
Discharge Summary/Instructions after an Endoscopic Procedure  Patient Name: Joss Matute  Patient MRN: [id number]  Patient YOB: 1985  Tuesday, March 10, 2020  Bubba Spence MD  RESTRICTIONS:  During your procedure today, you received medications for sedation.  These   medications may affect your judgment, balance and coordination.  Therefore,   for 24 hours, you have the following restrictions:   - DO NOT drive a car, operate machinery, make legal/financial decisions,   sign important papers or drink alcohol.    ACTIVITY:  Today: no heavy lifting, straining or running due to procedural   sedation/anesthesia.  The following day: return to full activity including work.  DIET:  Eat and drink normally unless instructed otherwise.     TREATMENT FOR COMMON SIDE EFFECTS:  - Mild abdominal pain, nausea, belching, bloating or excessive gas:  rest,   eat lightly and use a heating pad.  - Sore Throat: treat with throat lozenges and/or gargle with warm salt   water.  - Because air was used during the procedure, expelling large amounts of air   from your rectum or belching is normal.  - If a bowel prep was taken, you may not have a bowel movement for 1-3 days.    This is normal.  SYMPTOMS TO WATCH FOR AND REPORT TO YOUR PHYSICIAN:  1. Abdominal pain or bloating, other than gas cramps.  2. Chest pain.  3. Back pain.  4. Signs of infection such as: chills or fever occurring within 24 hours   after the procedure.  5. Rectal bleeding, which would show as bright red, maroon, or black stools.   (A tablespoon of blood from the rectum is not serious, especially if   hemorrhoids are present.)  6. Vomiting.  7. Weakness or dizziness.  GO DIRECTLY TO THE NEAREST EMERGENCY ROOM IF YOU HAVE ANY OF THE FOLLOWING:      Difficulty breathing              Chills and/or fever over 101 F   Persistent vomiting and/or vomiting blood   Severe abdominal pain   Severe chest pain   Black, tarry stools   Bleeding- more than one  tablespoon   Any other symptom or condition that you feel may need urgent attention  Your doctor recommends these additional instructions:  If any biopsies were taken, your doctors clinic will contact you in 1 to 2   weeks with any results.  - Discharge patient to home.   - Patient has a contact number available for emergencies.  The signs and   symptoms of potential delayed complications were discussed with the   patient.  Return to normal activities tomorrow.  Written discharge   instructions were provided to the patient.   - Resume previous diet.   - Continue present medications.   - Await pathology results.   - Repeat colonoscopy (date not yet determined) to assess disease activity.   - Return to GI clinic as previously scheduled.   For questions, problems or results please call your physician - Bubba Spence MD at Work:  [phone].  [facility], EMERGENCY ROOM PHONE NUMBER: [phone]  IF A COMPLICATION OR EMERGENCY SITUATION ARISES AND YOU ARE UNABLE TO REACH   YOUR PHYSICIAN - GO DIRECTLY TO THE EMERGENCY ROOM.  Bubba Spence MD  3/10/2020 3:19:21 PM  This report has been verified and signed electronically.  [employer]

## 2020-03-10 NOTE — ANESTHESIA POSTPROCEDURE EVALUATION
Anesthesia Post Evaluation    Patient: Joss Matute    Procedure(s) Performed: Procedure(s) (LRB):  COLONOSCOPY (N/A)    Final Anesthesia Type: general    Patient location during evaluation: PACU  Patient participation: Yes- Able to Participate  Level of consciousness: awake and alert  Post-procedure vital signs: reviewed and stable  Pain management: adequate  Airway patency: patent    PONV status at discharge: No PONV  Anesthetic complications: no      Cardiovascular status: blood pressure returned to baseline  Respiratory status: spontaneous ventilation and room air  Hydration status: euvolemic  Follow-up not needed.          Vitals Value Taken Time   /81 3/10/2020  3:38 PM   Temp 37 °C (98.6 °F) 3/10/2020  3:23 PM   Pulse 87 3/10/2020  3:38 PM   Resp 16 3/10/2020  3:38 PM   SpO2 100 % 3/10/2020  3:38 PM         No case tracking events are documented in the log.      Pain/Ferny Score: Ferny Score: 9 (3/10/2020  3:23 PM)

## 2020-03-10 NOTE — DISCHARGE INSTRUCTIONS
Colonoscopy     A camera attached to a flexible tube with a viewing lens is used to take video pictures.     Colonoscopy is a test to view the inside of your lower digestive tract (colon and rectum). Sometimes it can show the last part of the small intestine (ileum). During the test, small pieces of tissue may be removed for testing. This is called a biopsy. Small growths, such as polyps, may also be removed.   Why is colonoscopy done?  The test is done to help look for colon cancer. And it can help find the source of abdominal pain, bleeding, and changes in bowel habits. It may be needed once a year, depending on factors such as your:  · Age  · Health history  · Family health history  · Symptoms  · Results from any prior colonoscopy  Risks and possible complications  These include:  · Bleeding               · A puncture or tear in the colon   · Risks of anesthesia  · A cancer lesion not being seen  Getting ready   To prepare for the test:  · Talk with your healthcare provider about the risks of the test (see below). Also ask your healthcare provider about alternatives to the test.  · Tell your healthcare provider about any medicines you take. Also tell him or her about any health conditions you may have.  · Make sure your rectum and colon are empty for the test. Follow the diet and bowel prep instructions exactly. If you dont, the test may need to be rescheduled.  · Plan for a friend or family member to drive you home after the test.     Colonoscopy provides an inside view of the entire colon.     You may discuss the results with your doctor right away or at a future visit.  During the test   The test is usually done in the hospital on an outpatient basis. This means you go home the same day. The procedure takes about 30 minutes. During that time:  · You are given relaxing (sedating) medicine through an IV line. You may be drowsy, or fully asleep.  · The healthcare provider will first give you a physical exam to  check for anal and rectal problems.  · Then the anus is lubricated and the scope inserted.  · If you are awake, you may have a feeling similar to needing to have a bowel movement. You may also feel pressure as air is pumped into the colon. Its OK to pass gas during the procedure.  · Biopsy, polyp removal, or other treatments may be done during the test.  After the test   You may have gas right after the test. It can help to try to pass it to help prevent later bloating. Your healthcare provider may discuss the results with you right away. Or you may need to schedule a follow-up visit to talk about the results. After the test, you can go back to your normal eating and other activities. You may be tired from the sedation and need to rest for a few hours.  Date Last Reviewed: 11/1/2016 © 2000-2017 [employer]. [address] [place] [address], [place] [address]. All rights reserved. This information is not intended as a substitute for professional medical care. Always follow your healthcare professional's instructions.

## 2020-03-10 NOTE — ANESTHESIA PREPROCEDURE EVALUATION
03/10/2020  Joss Matute is a 34 y.o., male.  Past Medical History:   Diagnosis Date    Crohn's disease of large intestine with fistula 10/10/2018    Ex-smoker 1/24/2018    Immunosuppressed status 1/27/2020    Penile venereal warts 1/25/2018    Vitamin D deficiency 6/26/2017     Past Surgical History:   Procedure Laterality Date    ABSCESS DRAINAGE      ANAL FISTULOTOMY      COLONOSCOPY      COLONOSCOPY N/A 4/7/2017    Procedure: COLONOSCOPY;  Surgeon: Bubba Spence MD;  Location: [facility] ENDO (4TH FLR);  Service: Endoscopy;  Laterality: N/A;    COLONOSCOPY N/A 10/10/2018    Procedure: COLONOSCOPY;  Surgeon: Bubba Spence MD;  Location: [facility] ENDO (4TH FLR);  Service: Endoscopy;  Laterality: N/A;  schedule as 45 minute case--due Fall 2018    EXAMINATION UNDER ANESTHESIA N/A 12/30/2019    Procedure: Exam under anesthesia- possible seton;  Surgeon: DENISE Monterroso MD;  Location: [facility] OR 2ND FLR;  Service: Colon and Rectal;  Laterality: N/A;    INCISION AND DRAINAGE OF ABSCESS  12/30/2019    Procedure: INCISION AND DRAINAGE, ABSCESS;  Surgeon: DENISE Monterroso MD;  Location: [facility] OR 2ND FLR;  Service: Colon and Rectal;;    INCISION OF PERIRECTAL ABSCESS  12/30/2019    Procedure: INCISION, ABSCESS, PERIRECTAL;  Surgeon: DENISE Monterroso MD;  Location: [facility] OR 2ND FLR;  Service: Colon and Rectal;;    INSERTION OF SETON STITCH N/A 12/30/2019    Procedure: PLACEMENT, SETON STITCH;  Surgeon: DENISE Monterroso MD;  Location: [facility] OR 2ND FLR;  Service: Colon and Rectal;  Laterality: N/A;    UPPER GASTROINTESTINAL ENDOSCOPY           Anesthesia Evaluation    I have reviewed the Patient Summary Reports.    I have reviewed the Nursing Notes.   I have reviewed the Medications.     Review of Systems  Anesthesia Hx:  No problems with previous Anesthesia  History of prior surgery of interest to airway  management or planning: Denies Family Hx of Anesthesia complications.   Denies Personal Hx of Anesthesia complications.   Hematology/Oncology:  Hematology Normal   Oncology Normal     EENT/Dental:EENT/Dental Normal   Cardiovascular:  Cardiovascular Normal     Pulmonary:  Pulmonary Normal    Renal/:  Renal/ Normal     Hepatic/GI:  Hepatic/GI Normal    Musculoskeletal:  Musculoskeletal Normal    Neurological:  Neurology Normal    Endocrine:  Endocrine Normal    Dermatological:  Skin Normal    Psych:  Psychiatric Normal           Physical Exam  General:  Well nourished    Airway/Jaw/Neck:  AIRWAY FINDINGS: Normal      Eyes/Ears/Nose:  EYES/EARS/NOSE FINDINGS: Normal   Dental:  DENTAL FINDINGS: Normal   Chest/Lungs:  Chest/Lungs Clear    Heart/Vascular:  Heart Findings: Normal Heart murmur: negative Vascular Findings: Normal    Abdomen:  Abdomen Findings: Normal    Musculoskeletal:  Musculoskeletal Findings: Normal   Skin:  Skin Findings: Normal    Mental Status:  Mental Status Findings: Normal        Anesthesia Plan  Type of Anesthesia, risks & benefits discussed:  Anesthesia Type:  general  Patient's Preference:   Intra-op Monitoring Plan: standard ASA monitors  Intra-op Monitoring Plan Comments:   Post Op Pain Control Plan:   Post Op Pain Control Plan Comments:   Induction:   IV  Beta Blocker:  Patient is not currently on a Beta-Blocker (No further documentation required).       Informed Consent: Patient understands risks and agrees with Anesthesia plan.  Questions answered. Anesthesia consent signed with patient.  ASA Score: 2     Day of Surgery Review of History & Physical: I have interviewed and examined the patient. I have reviewed the patient's H&P dated:  There are no significant changes.          Ready For Surgery From Anesthesia Perspective.

## 2020-03-12 LAB
FINAL PATHOLOGIC DIAGNOSIS: NORMAL
GROSS: NORMAL

## 2020-03-17 ENCOUNTER — TELEPHONE (OUTPATIENT)
Dept: PHARMACY | Facility: CLINIC | Age: 35
End: 2020-03-17

## 2020-03-17 ENCOUNTER — TELEPHONE (OUTPATIENT)
Dept: ENDOSCOPY | Facility: HOSPITAL | Age: 35
End: 2020-03-17

## 2020-03-26 ENCOUNTER — TELEPHONE (OUTPATIENT)
Dept: DERMATOLOGY | Facility: CLINIC | Age: 35
End: 2020-03-26

## 2020-03-26 NOTE — TELEPHONE ENCOUNTER
Spoke with pt. Patient stated to cancel upcoming appt on 4/13/20 and he will call and reschedule for a later date.

## 2020-03-30 ENCOUNTER — OFFICE VISIT (OUTPATIENT)
Dept: GASTROENTEROLOGY | Facility: CLINIC | Age: 35
End: 2020-03-30
Payer: COMMERCIAL

## 2020-03-30 DIAGNOSIS — Z87.891 FORMER SMOKER: ICD-10-CM

## 2020-03-30 DIAGNOSIS — K61.1 PERIRECTAL ABSCESS: ICD-10-CM

## 2020-03-30 DIAGNOSIS — K50.113 CROHN'S DISEASE OF LARGE INTESTINE WITH FISTULA: Primary | ICD-10-CM

## 2020-03-30 DIAGNOSIS — E55.9 VITAMIN D DEFICIENCY: ICD-10-CM

## 2020-03-30 PROCEDURE — 99213 OFFICE O/P EST LOW 20 MIN: CPT | Mod: 95,,, | Performed by: INTERNAL MEDICINE

## 2020-03-30 PROCEDURE — 99213 PR OFFICE/OUTPT VISIT, EST, LEVL III, 20-29 MIN: ICD-10-PCS | Mod: 95,,, | Performed by: INTERNAL MEDICINE

## 2020-03-30 RX ORDER — CHOLECALCIFEROL (VITAMIN D3) 25 MCG
2000 TABLET ORAL DAILY
COMMUNITY
End: 2022-04-05

## 2020-03-30 NOTE — PROGRESS NOTES
"     [facility]             Inflammatory Bowel Disease Follow-up  Note              TODAY'S VISIT DATE:  3/30/2020    Chief Complaint:   Chief Complaint   Patient presents with    Crohn's Disease     PCP: Primary Doctor No    Previous History:  Joss Matute is a 34 y.o. male with Crohn's disease (ileum, rectal ulcer, recurrent perianal fistula/abscess- 12/96, 3/2017, 12/2019).  He began with symptoms of a perirectal abscess in December 1996 that was initially drained in the ED.  The abscess recurred in December 1997 and was associated with vomiting, weight loss, and abdominal pain.  His initial colonoscopy in 1998 (no actual record just reported in clinic note) showed moderately severe ileitis with biopsies of the right/transverse/left colon and rectum with mild nonspecific acute and chronic reactive changes.  The transverse colon polyp (likely removed) pathology showed it to be "edematous with mild glandular dysplasia."  At that time he was finally diagnosed with Crohn's disease but unsure of the exact location.  Initially he recalls being given prednisone for 4-5 months and pentasa for maintenance.  By 2002, age 17, he stopped all medications and did not have any GI follow up.  He had a hospital admission for a "flare" with dehydration in 2004 and again was not started on any medications.  He was seen in 2007 for RLQ abdominal cramping, increasing fatigue, and increased bowel frequency with blood and mucous.  He had a repeat colonoscopy in 2/2007 that showed patchy area of mucosa in the distal 4 cm of the TI with normal biopsies.  He had recurrent abscess/fistula in 2008 that spontaneously drained and was seen by CRS and did not wish to take any medications.  He reports intermittent abscess/perianal fistula from [id number] with chronic drainage that would cause he to intermittently seek treatment of I&D in the ER.  In 10/2016 he established care with Dr. Bryson when he had a recurrent " perianal fistula who planned to start patient on humira after surgical intervention.  He was seen by Dr. Cason on 3/15/2017 and had an EUA with seton placement.  He most recent colonoscopy on 4/7/2017 performed by Dr. LEANDRO Spence showed showed perianal skin tag, seton intact and small rectal ulcer with biopsies consistent with patchy active colitis with features of chronic mucosal injury.  He started Humira on 5/1/2017.  Setons were removed by Dr. Cason in 7/2017.  He started oral MTX in 8/2017 for immunogenicity prevention.  Trough [facility] humira levels were 2.5 with ABs 57 (low) on 1/8/2018 so we planned to increase humira to 40 mg SC weekly and start oral MTX 12.5 mg PO weekly with daily folic acid.  He started humira 40 mg SC every 7 days on 2/6/2018.  Repeat trough [facility] Humira levels were 15 with low ABs of 30 on 5/17/2018.  He had elevated LFTs on 8/13/2018 so oral MTX was discontinued.  Colonoscopy on 10/10/2018 showed a small perianal skin tar, rectal fistula, other normal rectum, colon, and ileum with normal biopsies.  Trough [facility] Humira level 20 with low ABs 26 on 11/16/2018 on humira 40 mg SC every 7 days.  Patient was seen by Dr. Monterroso to assess his fistula and discuss repair on 3/13/2019.  MRI on 3/20/2019 ordered by Dr. Monterroso showed three perianal fistulas with 2 appeared to have blind endings and one extending into the skin surface at the right medial gluteal fold.  On 5/13/2019 patient had some extreme diarrhea and abdominal pain last for a few hours and then resolved.  On 12/29/2019 patient had perianal drainage and rectal pain and started on ciprofloxacin/Flagyl.  He had exam under anesthesia on 12/30/2019 which showed at which time perirectal abscess was drained and seton inserted.     Interval History:  - current IBD meds: Humira 40 mg SC every 7 days (started 5/1/17, increased to weekly 2/6/18, missed dose 1/3/20 due to abscess and took dose 8 days later, last dose 3/28/20, next dose  "20)  - 2-3 liquid to formed Bowel Movements/day with no blood, urgency, nocturnal bowel movements  - 20 trough humira level 13/Abs 29  - 3/10/20 colonoscopy: skin tags and seton intact, few rare mucosal scars in rectum with remainder of rectum and colon normal. Terminal ileum normal. Biopsies of terminal ileum normal, biopsies of rectum with minimal architectural distortion  - 3/10/20 MRE:  Mild postcontrast enhancement of the terminal ileum wall likely due to chronic inflammatory change without wall thickening or intraluminal stricture. Perirectal fistulas are better visualized on prior dedicated anal sphincter MRI noting interval seton placement.  - NSAID use: No  - Narcotic use: No  - Alternative/complementary meds for IBD: No      Prior Pertinent Surgeries:   previous fistula repair Xs 2  3/15/2017: EUA with seton placement    Pertinent Endoscopy/Imagin Colonoscopy: (per clinic note path only) showed moderately severe ileitis; right, transverse, left, and rectum showed mild non-specific acute and chronic reactive changes; " transverse polyp path showed it to be edematous with mild glandular dysplasia"  2007 Colonoscopy: patchy area of mucosa in the ileum was nodular and granular (most distal 4 cm of TI) (path: normal); moderate amount of semisolid stool in the entire colon  2012 SBFT: normal  2017 Colonoscopy: fistula in the mauricio-anal area with no active drainage; normal mucosa in the whole colon and TI (no biopsies)  2017 MRE: no definitive evidence for abnormal mucosal enhancement within the small bowel; suspicious early fistulous tract/fissure within the distal rectum/perianal region tracking along the 9 oclock position rotated into the 6 oclock position and exiting along the anal canal/fissure; no evidence for abnormal enhancing structures within the abdomen/pelvis  2017 Colonoscopy: perianal skin tag; small rectal ulcer in the rectum (seen on retroflexion) (path: " ulcer and adjacent patchy active colitis and features of chronic mucosal injury), remainder colon normal (path: normal); normal TI (path: normal); seton intact   3/20/2019 MRI pelvis: Three perianal fistulous, two appear blind ending and one of which extends to the skin surface at the right medial gluteal fold.  No evidence for abscess  3/10/20 colonoscopy: skin tags and seton intact, few rare mucosal scars in rectum with remainder of rectum and colon normal. Terminal ileum normal. Biopsies of terminal ileum normal, biopsies of rectum with minimal architectural distortion  3/10/20 MRE:  Mild postcontrast enhancement of the terminal ileum wall likely due to chronic inflammatory change without wall thickening or intraluminal stricture. Perirectal fistulas are better visualized on prior dedicated anal sphincter MRI noting interval seton placement.    Pertinent Labs:  Lab Results   Component Value Date    SEDRATE 2 04/24/2017    CRP 0.4 01/27/2020     Lab Results   Component Value Date    TTGIGA 7 04/24/2017     04/24/2017     No results found for: TSH, FREET4  Lab Results   Component Value Date    GLASODNK01NI 26 (L) 05/15/2019    FNTWAOYA22 296 05/15/2019     Lab Results   Component Value Date    HEPBSAG Negative 05/15/2019    HEPBCAB Negative 05/15/2019     No results found for: DXX36NJBG  Lab Results   Component Value Date    NIL 0.050 01/27/2020    MITOGENNIL 6.520 01/27/2020    TSPOTSCREN See result image under hyperlink 11/29/2018     Lab Results   Component Value Date    TPTMINTERP SEE BELOW 04/24/2017     Therapeutic Drug Monitoring Labs:  1/8/2018: Trough [facility] ADA Levels 2.5, ABs 57 (low) (humira 40 mg SC every 14 days)  5/17/2018: [facility] Trough Humira Level 15, ABs 30 (low) (humira 40 mg SC every 7 days)  11/16/2018: Trough [facility] Humira Level 20, ABs 26 (low) (humira 40 mg SC every 7 days)  1/2/20 trough humira level 13/Abs 29 (low) (humira 40 mg SC every 7 days)    Prior IBD  Meds:  Prednisone  Pentasa  MTX 12.5 mg PO weekly (started 8/2017, stopped 8/13/2018 due to elevated LFTs)    Vaccinations:  Lab Results   Component Value Date    HEPBSAB Positive (A) 08/13/2018     Lab Results   Component Value Date    HEPAIGG Positive (A) 08/13/2018     Lab Results   Component Value Date    VARICELLAZOS 2.98 (H) 04/24/2017    VARICELLAINT Positive (A) 04/24/2017     Immunization History   Administered Date(s) Administered    Hepatitis A / Hepatitis B 01/25/2018, 02/22/2018, 07/26/2018    Pneumococcal Conjugate - 13 Valent 04/24/2017    Pneumococcal Polysaccharide - 23 Valent 07/25/2017    Tdap 04/24/2017     Influenza (inactive): 2019    Pneumococcal PCV 13: 4/24/17  Pneumococcal PCV 23: 7/25/17  Tetanus (TdaP): 4/24/17  HPV (males and females ages 19-25 yo):      Meningococcal:  UTD with childhood vaccines  Hepatitis A/B:  1/25/18, 2/22/18, 7/26/18, immune  MMR (live vaccine): UTD with childhood vaccines  Chickenpox status/Varicella (live vaccine): immune  Shingrix: recommended, script given today    Review of Systems   Constitutional: Negative for chills, fever and weight loss.   Eyes: Negative for pain and redness.   Respiratory: Negative for cough and shortness of breath.    Cardiovascular: Negative for chest pain.   Gastrointestinal: Negative for abdominal pain, heartburn, nausea and vomiting.   Genitourinary: Negative for hematuria.   Musculoskeletal: Negative for back pain.   Skin: Negative for rash.   Psychiatric/Behavioral: Negative for depression. The patient is not nervous/anxious.      All Medical History/Surgical History/Family History/Social History/Allergies have been reviewed and updated in EMR    Review of patient's allergies indicates:  No Known Allergies    Outpatient Medications Marked as Taking for the 3/30/20 encounter (Office Visit) with Bubba Spence MD   Medication Sig Dispense Refill    adalimumab (HUMIRA) PnKt injection Inject 0.8 mLs (40 mg total) into the skin  every 7 days. 4 pen 5     Labs:   Lab Results   Component Value Date    WBC 7.07 01/27/2020    HGB 13.7 (L) 01/27/2020    HCT 43.6 01/27/2020    MCV 93 01/27/2020     (H) 01/27/2020     Lab Results   Component Value Date    CREATININE 0.8 12/30/2019    ALBUMIN 3.6 12/30/2019    BILITOT 0.9 12/30/2019    ALKPHOS 65 12/30/2019    AST 16 12/30/2019    ALT 12 12/30/2019       Assessment/Plan:  Joss Matute is a 34 y.o. male with Crohn's disease (ileum, rectal ulcer, recurrent perianal fistula/abscess- 12/96, 3/2017, 12/2019) who continues on Humira 40 mg SC weekly.  He had a recent colonoscopy that showed a normal colon and terminal ileum.  At this time I would like to continue him on Humira weekly though if he has recurrent abscess after removal of seton then we may need to switch his treatment to Remicade.  We will defer any routine health maintenance as well as removal of seton at this time to minimize exposure during COVID pandemic.    # Crohn's disease (ileum, rectal ulcer, recurrent perianal fistula/abscess- 12/96, 3/2017, 12/2019)  - continue Humira 40 mg SC every weekly  - stool calprotectin March 2021  - colonoscopy summer/fall 2021  - consider removal of seton in future but will avoid pt exposure to hospital at this time due to COVID pandemic  - vitamin B12 (5/15/19 vit B12 296)- reminded pt to start vit B12 1000 mcg daily  - ex-smoker:  Quit fall 2015, discussed risks of smoking and CD with patient in past  - CRC Risk: personal h/o of sporadic adenoma in 1998, proctitis/ileal disease with symptom onset in 1998; surveillance every 5 years   - drug monitoring labs: CBC/CMP every 6 months (due 7/2020), TPMT (normal 4/2017), TB quantiferon (negative 1/2020, repeat 1/2021), Hep B testing (HBsAg, HBcAb negative 5/2019, repeat due 5/2020 but will defer until 7/2020 labs)  - TDM:  Repeat trough levels/abs if recurrent abscess or yearly whichever comes first    # IBD specific health maintenance:  Skin exam  yearly - appt cancelled due to COVID, will reschedule at later date  Risk for osteopenia/osteoporosis-none  Vitamin D (5/2019 vit D 26)- reminded pt to start vit D3 1000 IU/day  Vaccines: no live vaccines, shingrix script given to patient    Follow up: 7/2020 video visit    The patient location is:  Patient Home   The chief complaint leading to consultation is: Crohn's disease  Visit type: Virtual visit with synchronous audio and video  Total time spent with patient: 15 min  Each patient to whom he or she provides medical services by telemedicine is:  (1) informed of the relationship between the physician and patient and the respective role of any other health care provider with respect to management of the patient; and (2) notified that he or she may decline to receive medical services by telemedicine and may withdraw from such care at any time.    Bubba Spence MD  Department of Gastroenterology  Medical Director, Inflammatory Bowel Disease

## 2020-03-30 NOTE — PATIENT INSTRUCTIONS
Instructions:  - start vit D 9571-2073 IU/day  - start vit B12 1000 mcg daily  - start vit C daily  - defer shingrix, derm visit and seton removal for now due to COVID  - f/u video visit 7/2020

## 2020-04-13 ENCOUNTER — TELEPHONE (OUTPATIENT)
Dept: PHARMACY | Facility: CLINIC | Age: 35
End: 2020-04-13

## 2020-04-13 NOTE — TELEPHONE ENCOUNTER
Refill readiness for Humira confirmed with patient for Crohn's disease; name/ confirmed; no missed doses; no new medications; no new allergies; no side effects noted; address confirmed for  shipment and 4/15 delivery. $0 copay. Patient states he has 0 doses remaining and needs next dose by . No sharps container needed.    Humberto Cifuentes, Pharm.D.  Pharmacy Resident, PGY-1   [facility]

## 2020-05-05 ENCOUNTER — TELEPHONE (OUTPATIENT)
Dept: GASTROENTEROLOGY | Facility: CLINIC | Age: 35
End: 2020-05-05

## 2020-05-05 NOTE — TELEPHONE ENCOUNTER
Called pt, no answer, lm explaining I was returning his call.  Asked him to shoot me a portal message or give me a call back tomorrow as I was about to leave for the day.  If I do not hear back from him by lunch tomorrow I will try him back.  Left main line and my temp EXT to return call

## 2020-05-05 NOTE — TELEPHONE ENCOUNTER
----- Message from Naima Velazquez sent at 5/5/2020  4:11 PM CDT -----  Contact: [phone]  Calling in regards to speaking with nurse about procedure performed in 12/2020. Please call

## 2020-05-07 ENCOUNTER — TELEPHONE (OUTPATIENT)
Dept: GASTROENTEROLOGY | Facility: CLINIC | Age: 35
End: 2020-05-07

## 2020-05-07 NOTE — TELEPHONE ENCOUNTER
Called and spoke with pt  He asked if Dr Spence would consider Seton removal now since things are opening back up  He's had them in for several months and would like them removed     Explained I would discuss with Dr Spence  I believe Dr Monterroso would be the one to remove them but I will confirm that

## 2020-05-08 ENCOUNTER — TELEPHONE (OUTPATIENT)
Dept: PHARMACY | Facility: CLINIC | Age: 35
End: 2020-05-08

## 2020-05-08 ENCOUNTER — TELEPHONE (OUTPATIENT)
Dept: GASTROENTEROLOGY | Facility: CLINIC | Age: 35
End: 2020-05-08

## 2020-05-08 NOTE — TELEPHONE ENCOUNTER
Called and poke with pt  Explained we will discuss with Dr Monterroso and will be in touch   He appreciated the call

## 2020-05-08 NOTE — TELEPHONE ENCOUNTER
----- Message from Bubba Spence MD sent at 5/8/2020  2:15 PM CDT -----  Let patient know that I am reaching out to Dr. Monterroso to see when setons can be removed and will let him know soon.     SS  ----- Message -----  From: Bubba Spence MD  Sent: 5/8/2020  12:15 PM CDT  To: Bubba Spence MD, [facility] Staff    Let patient know that I am reaching out to Dr. Monterroso to see when setons can be removed and will let him know soon

## 2020-05-08 NOTE — TELEPHONE ENCOUNTER
Refill call regarding Humira from [facility]. Shipping out Humira on  for  arrival with patients consent. Copay of $0 @ 004. Address and  confirmed.

## 2020-05-12 ENCOUNTER — TELEPHONE (OUTPATIENT)
Dept: GASTROENTEROLOGY | Facility: CLINIC | Age: 35
End: 2020-05-12

## 2020-05-12 ENCOUNTER — TELEPHONE (OUTPATIENT)
Dept: SURGERY | Facility: CLINIC | Age: 35
End: 2020-05-12

## 2020-05-12 NOTE — TELEPHONE ENCOUNTER
----- Message from Norma Lunsford MA sent at 5/8/2020  2:29 PM CDT -----  Has Dr Spence let pt know when he can have the setons removed   (needed to discuss with Dr Monterroso)

## 2020-05-12 NOTE — TELEPHONE ENCOUNTER
Called and spoke with pt  Explained Dr Spence sent a message to Dr Monterroso about the seton removal and we have not heard back  Explained it would be better for him to reach out to Dr Monterroso's office directly.    He expressed understanding and appreciated the call

## 2020-05-12 NOTE — TELEPHONE ENCOUNTER
----- Message from Yu Petersen sent at 5/12/2020  4:22 PM CDT -----  Contact: pt: [phone]  Pt would like to speak with Dr. Monterroso re having seton removed      Please contact pt: [phone]

## 2020-05-18 ENCOUNTER — PATIENT MESSAGE (OUTPATIENT)
Dept: SURGERY | Facility: CLINIC | Age: 35
End: 2020-05-18

## 2020-05-18 DIAGNOSIS — K50.813 CROHN'S DISEASE OF BOTH SMALL AND LARGE INTESTINE WITH FISTULA: ICD-10-CM

## 2020-05-18 NOTE — TELEPHONE ENCOUNTER
Received refill request from [facility] for pts Humira Rx.    Allergies reviewed    Last labs: 1/27/2020    Next appt: 7/8/2020    RX pended for approval

## 2020-06-05 ENCOUNTER — TELEPHONE (OUTPATIENT)
Dept: PHARMACY | Facility: CLINIC | Age: 35
End: 2020-06-05

## 2020-06-16 ENCOUNTER — OFFICE VISIT (OUTPATIENT)
Dept: SURGERY | Facility: CLINIC | Age: 35
End: 2020-06-16
Payer: COMMERCIAL

## 2020-06-16 VITALS
BODY MASS INDEX: 26.15 KG/M2 | SYSTOLIC BLOOD PRESSURE: 134 MMHG | HEART RATE: 86 BPM | HEIGHT: 73 IN | DIASTOLIC BLOOD PRESSURE: 79 MMHG | WEIGHT: 197.31 LBS

## 2020-06-16 DIAGNOSIS — K50.113 CROHN'S DISEASE OF LARGE INTESTINE WITH FISTULA: ICD-10-CM

## 2020-06-16 DIAGNOSIS — K60.3 TRANSSPHINCTERIC ANAL FISTULA: Primary | ICD-10-CM

## 2020-06-16 PROCEDURE — 99213 OFFICE O/P EST LOW 20 MIN: CPT | Mod: 25,S$GLB,, | Performed by: COLON & RECTAL SURGERY

## 2020-06-16 PROCEDURE — 99999 PR PBB SHADOW E&M-EST. PATIENT-LVL III: ICD-10-PCS | Mod: PBBFAC,,, | Performed by: COLON & RECTAL SURGERY

## 2020-06-16 PROCEDURE — 46030 PR REMOVAL OF RECTAL MARKER: ICD-10-PCS | Mod: S$GLB,,, | Performed by: COLON & RECTAL SURGERY

## 2020-06-16 PROCEDURE — 99999 PR PBB SHADOW E&M-EST. PATIENT-LVL III: CPT | Mod: PBBFAC,,, | Performed by: COLON & RECTAL SURGERY

## 2020-06-16 PROCEDURE — 46030 REMOVAL ANAL SETON OTH MRK: CPT | Mod: S$GLB,,, | Performed by: COLON & RECTAL SURGERY

## 2020-06-16 PROCEDURE — 3008F BODY MASS INDEX DOCD: CPT | Mod: CPTII,S$GLB,, | Performed by: COLON & RECTAL SURGERY

## 2020-06-16 PROCEDURE — 3008F PR BODY MASS INDEX (BMI) DOCUMENTED: ICD-10-PCS | Mod: CPTII,S$GLB,, | Performed by: COLON & RECTAL SURGERY

## 2020-06-16 PROCEDURE — 99213 PR OFFICE/OUTPT VISIT, EST, LEVL III, 20-29 MIN: ICD-10-PCS | Mod: 25,S$GLB,, | Performed by: COLON & RECTAL SURGERY

## 2020-06-16 NOTE — PROGRESS NOTES
Office Visit - Established Patient    HPI:  Joss Matute is a 34 y.o. male with history of Crohn's colitis and anal fistulas since age 12.  He is currently on Humira.  In December he was admitted with an perirectal abscess and underwent I&D along with seton placement.  This consisted of a seton between two external counter incisions.  There was no evidence of an internal opening.    He states that he is much improved since this procedure.  His anal pain is gone.  There is no further swelling.  He continues to have some drainage.  The seton remains intact.  He currently has about 3 or more BMs per day.  There is no blood nor mucous.  He denies abdominal pain, fevers, or chills.  His weight is stable.    The patient does drink 5+ alcoholic drinks per day.          Past Medical History:   Diagnosis Date    Crohn's disease of large intestine with fistula 10/10/2018    Ex-smoker 1/24/2018    Immunosuppressed status 1/27/2020    Penile venereal warts 1/25/2018    Vitamin D deficiency 6/26/2017        Past Surgical History:   Procedure Laterality Date    ABSCESS DRAINAGE      ANAL FISTULOTOMY      COLONOSCOPY      COLONOSCOPY N/A 4/7/2017    Procedure: COLONOSCOPY;  Surgeon: Bubba Spence MD;  Location: [address]);  Service: Endoscopy;  Laterality: N/A;    COLONOSCOPY N/A 10/10/2018    Procedure: COLONOSCOPY;  Surgeon: Bubba Spence MD;  Location: [address]);  Service: Endoscopy;  Laterality: N/A;  schedule as 45 minute case--due Fall 2018    COLONOSCOPY N/A 3/10/2020    Procedure: COLONOSCOPY;  Surgeon: Bubba Spence MD;  Location: [facility] ([address] [place] [address]);  Service: Endoscopy;  Laterality: N/A;  schedule 40 minute case  March 2020        EXAMINATION UNDER ANESTHESIA N/A 12/30/2019    Procedure: Exam under anesthesia- possible seton;  Surgeon: DENISE Monterroso MD;  [address];  Service: Colon and Rectal;  Laterality: N/A;    INCISION AND DRAINAGE OF ABSCESS  12/30/2019     Procedure: INCISION AND DRAINAGE, ABSCESS;  Surgeon: DENISE Monterroso MD;  Location: [facility] OR 2ND FLR;  Service: Colon and Rectal;;    INCISION OF PERIRECTAL ABSCESS  2019    Procedure: INCISION, ABSCESS, PERIRECTAL;  Surgeon: DENISE Monterroso MD;  Location: [facility] OR 2ND FLR;  Service: Colon and Rectal;;    INSERTION OF SETON STITCH N/A 2019    Procedure: PLACEMENT, SETON STITCH;  Surgeon: DENISE Monterroso MD;  Location: [facility] OR 2ND FLR;  Service: Colon and Rectal;  Laterality: N/A;    UPPER GASTROINTESTINAL ENDOSCOPY         Review of patient's allergies indicates:  No Known Allergies    Family History   Problem Relation Age of Onset    ALS Paternal Grandfather     Celiac disease Neg Hx     Cirrhosis Neg Hx     Colon cancer Neg Hx     Colon polyps Neg Hx     Crohn's disease Neg Hx     Cystic fibrosis Neg Hx     Esophageal cancer Neg Hx     Hemochromatosis Neg Hx     Inflammatory bowel disease Neg Hx     Irritable bowel syndrome Neg Hx     Liver cancer Neg Hx     Liver disease Neg Hx     Rectal cancer Neg Hx     Stomach cancer Neg Hx     Ulcerative colitis Neg Hx     Roby's disease Neg Hx     Lupus Neg Hx     Multiple sclerosis Neg Hx     Tuberculosis Neg Hx     Rheum arthritis Neg Hx     Scleroderma Neg Hx     Lymphoma Neg Hx        Social History     Socioeconomic History    Marital status:      Spouse name: Not on file    Number of children: Not on file    Years of education: Not on file    Highest education level: Not on file   Occupational History    Not on file   Social Needs    Financial resource strain: Not on file    Food insecurity     Worry: Not on file     Inability: Not on file    Transportation needs     Medical: Not on file     Non-medical: Not on file   Tobacco Use    Smoking status: Former Smoker     Types: Cigarettes     Quit date: 10/24/2015     Years since quittin.6    Smokeless tobacco: Never Used   Substance and Sexual Activity     Alcohol use: Yes     Comment: 20/week    Drug use: No    Sexual activity: Yes     Partners: Female     Comment:     Lifestyle    Physical activity     Days per week: Not on file     Minutes per session: Not on file    Stress: Not on file   Relationships    Social connections     Talks on phone: Not on file     Gets together: Not on file     Attends Anabaptism service: Not on file     Active member of club or organization: Not on file     Attends meetings of clubs or organizations: Not on file     Relationship status: Not on file   Other Topics Concern    Not on file   Social History Narrative     [place] [employer] [place]        ROS:  GENERAL: No fever, chills, fatigability or weight loss.  Integument: No rashes, redness, icterus  CHEST: Denies DAY, cyanosis, wheezing, cough and sputum production.  CARDIOVASCULAR: Denies chest pain, PND, orthopnea or reduced exercise tolerance.  GI: Denies abd pain, dysphagia, nausea, vomiting, no hematemesis   : Denies burning on urination, no hematuria, no bacteriuria  MSK: No deformities, swelling, joint pain swelling  Neurologic: No HAs, seizures, weakness, paresthesias, gait problems    PE:  General appearance no distress  There were no vitals taken for this visit.  Sclera/ Skin anicteric  Neck supple trachea midline   Chest symmetric, nl excursion, no retractions, breathing comfortably  Neuro:  Mood/ affect nl, alert and oriented x 3, moves all ext's, gait nl    Rectal  Inspection         CAMACHO chronic skin tags, no internal opening, no masses; seton removed      Assessment:  1. Crohn's colitis and perianal disease - seton removed today.  2. At risk drinking behavior    He had no evidence of an internal opening at the time of his last operation.  The perianal suppuration has resolved.  Therefore, the seton through two external openings was removed.    Plan:  - Cut back drinking to maximum of 2 standard alcoholic drinks per day.  - Follow-up in 4 weeks.    Javier PINEDA  MD [place]  Colon and Rectal Surgery Fellow    I have personally obtained a history and performed a physical exam with and independent to my resident and discussed the findings and plan with the patient.  I agree with the above findings and plan with the following exceptions:  None    H, Javier Monterroso MD, FACS, FASCRS  Staff Surgeon  Dept of Colon and Rectal Surgery  [facility] [place], [place]

## 2020-07-06 ENCOUNTER — TELEPHONE (OUTPATIENT)
Dept: PHARMACY | Facility: CLINIC | Age: 35
End: 2020-07-06

## 2020-07-08 ENCOUNTER — OFFICE VISIT (OUTPATIENT)
Dept: GASTROENTEROLOGY | Facility: CLINIC | Age: 35
End: 2020-07-08
Payer: COMMERCIAL

## 2020-07-08 DIAGNOSIS — D84.9 IMMUNOSUPPRESSED STATUS: ICD-10-CM

## 2020-07-08 DIAGNOSIS — Z87.891 FORMER SMOKER: ICD-10-CM

## 2020-07-08 DIAGNOSIS — K50.113 CROHN'S DISEASE OF LARGE INTESTINE WITH FISTULA: Primary | ICD-10-CM

## 2020-07-08 DIAGNOSIS — E55.9 VITAMIN D DEFICIENCY: ICD-10-CM

## 2020-07-08 PROCEDURE — 99214 PR OFFICE/OUTPT VISIT, EST, LEVL IV, 30-39 MIN: ICD-10-PCS | Mod: 95,,, | Performed by: INTERNAL MEDICINE

## 2020-07-08 PROCEDURE — 99214 OFFICE O/P EST MOD 30 MIN: CPT | Mod: 95,,, | Performed by: INTERNAL MEDICINE

## 2020-07-08 RX ORDER — LOPERAMIDE HYDROCHLORIDE 2 MG/1
2 CAPSULE ORAL
COMMUNITY
End: 2020-10-12

## 2020-07-08 RX ORDER — LANOLIN ALCOHOL/MO/W.PET/CERES
100 CREAM (GRAM) TOPICAL DAILY
COMMUNITY
End: 2022-04-05

## 2020-07-08 NOTE — PATIENT INSTRUCTIONS
Instructions:  - labs now  - Establish PCP #[phone]  - see if you can coordinate visit with dermatology and shingrix vaccine on same day

## 2020-07-08 NOTE — PROGRESS NOTES
"     [facility]             Inflammatory Bowel Disease Telemedicine Follow-up  Note              TODAY'S VISIT DATE:  7/8/2020    Chief Complaint:   Chief Complaint   Patient presents with    Crohn's Disease     PCP: Primary Doctor No    Previous History:  Joss Matute is a 34 y.o. male with Crohn's disease (ileum, rectal ulcer, recurrent perianal fistula/abscess- 12/96, 3/2017, 12/2019, setons last removed 6/2020).  He began with symptoms of a perirectal abscess in December 1996 that was initially drained in the ED.  The abscess recurred in December 1997 and was associated with vomiting, weight loss, and abdominal pain.  His initial colonoscopy in 1998 (no actual record just reported in clinic note) showed moderately severe ileitis with biopsies of the right/transverse/left colon and rectum with mild nonspecific acute and chronic reactive changes.  The transverse colon polyp (likely removed) pathology showed it to be "edematous with mild glandular dysplasia."  At that time he was finally diagnosed with Crohn's disease but unsure of the exact location.  Initially he recalls being given prednisone for 4-5 months and pentasa for maintenance.  By 2002, age 17, he stopped all medications and did not have any GI follow up.  He had a hospital admission for a "flare" with dehydration in 2004 and again was not started on any medications.  He was seen in 2007 for RLQ abdominal cramping, increasing fatigue, and increased bowel frequency with blood and mucous.  He had a repeat colonoscopy in 2/2007 that showed patchy area of mucosa in the distal 4 cm of the TI with normal biopsies.  He had recurrent abscess/fistula in 2008 that spontaneously drained and was seen by CRS and did not wish to take any medications.  He reports intermittent abscess/perianal fistula from [id number] with chronic drainage that would cause he to intermittently seek treatment of I&D in the ER.  In 10/2016 he established care with " Dr. Bryson when he had a recurrent perianal fistula who planned to start patient on humira after surgical intervention.  He was seen by Dr. Cason on 3/15/2017 and had an EUA with seton placement.  He most recent colonoscopy on 4/7/2017 performed by Dr. LEANDRO Spence showed showed perianal skin tag, seton intact and small rectal ulcer with biopsies consistent with patchy active colitis with features of chronic mucosal injury.  He started Humira on 5/1/2017.  Setons were removed by Dr. Cason in 7/2017.  He started oral MTX in 8/2017 for immunogenicity prevention.  Trough [facility] humira levels were 2.5 with ABs 57 (low) on 1/8/2018 so we planned to increase humira to 40 mg SC weekly and start oral MTX 12.5 mg PO weekly with daily folic acid.  He started humira 40 mg SC every 7 days on 2/6/2018.  Repeat trough [facility] Humira levels were 15 with low ABs of 30 on 5/17/2018.  He had elevated LFTs on 8/13/2018 so oral MTX was discontinued.  Colonoscopy on 10/10/2018 showed a small perianal skin tar, rectal fistula, other normal rectum, colon, and ileum with normal biopsies.  Trough [facility] Humira level 20 with low ABs 26 on 11/16/2018 on humira 40 mg SC every 7 days.  Patient was seen by Dr. Monterroso to assess his fistula and discuss repair on 3/13/2019.  MRI on 3/20/2019 ordered by Dr. Monterroso showed three perianal fistulas with 2 appeared to have blind endings and one extending into the skin surface at the right medial gluteal fold.  On 5/13/2019 patient had some extreme diarrhea and abdominal pain last for a few hours and then resolved.  On 12/29/2019 patient had perianal drainage and rectal pain and started on ciprofloxacin/Flagyl.  He had exam under anesthesia on 12/30/2019 which showed at which time perirectal abscess was drained and seton inserted.  On 1/2/2020 patient had Humira level of 13 with antibodies 29.  Colonoscopy on 3/10/2020 showed anal skin tags with seton int with a few areas mucosal scarring in the rectum  "and remainder of the colon and terminal ileum normal.  MRE on 3/10/2020 showed some mild postcontrast enhancement of the terminal ileum likely due to chronic inflammatory change though no active disease and perirectal fistulas better visualized on the anal sphincter MRI noting interval seton placement.    Interval History:  - current IBD meds: Humira 40 mg SC q weekly (started 17, increased to weekly 18, last dose 20, next dose 20)  - other GI meds: imodium 2-4 times/week  - setons removed 2020  - 2-3 liquid to formed BMs/day, no blood, no urgency, no cutrently  - no recurrence of perianal abscess or perianal pain, feels like the perianal fistula may have closed   - alcohol history: was drinking 20 drinks/week and now about 14 drinks/week  - NSAID use: No  - Narcotic use: No  - Alternative/complementary meds for IBD: No      Prior Pertinent Surgeries:   previous fistula repair Xs 2  3/15/2017: EUA with seton placement    Pertinent Endoscopy/Imagin Colonoscopy: (per clinic note path only) showed moderately severe ileitis; right, transverse, left, and rectum showed mild non-specific acute and chronic reactive changes; " transverse polyp path showed it to be edematous with mild glandular dysplasia"  2007 Colonoscopy: patchy area of mucosa in the ileum was nodular and granular (most distal 4 cm of TI) (path: normal); moderate amount of semisolid stool in the entire colon  2012 SBFT: normal  2017 Colonoscopy: fistula in the mauricio-anal area with no active drainage; normal mucosa in the whole colon and TI (no biopsies)  2017 MRE: no definitive evidence for abnormal mucosal enhancement within the small bowel; suspicious early fistulous tract/fissure within the distal rectum/perianal region tracking along the 9 oclock position rotated into the 6 oclock position and exiting along the anal canal/fissure; no evidence for abnormal enhancing structures within the " abdomen/pelvis  4/7/2017 Colonoscopy: perianal skin tag; small rectal ulcer in the rectum (seen on retroflexion) (path: ulcer and adjacent patchy active colitis and features of chronic mucosal injury), remainder colon normal (path: normal); normal TI (path: normal); seton intact   3/20/2019 MRI pelvis: Three perianal fistulous, two appear blind ending and one of which extends to the skin surface at the right medial gluteal fold.  No evidence for abscess  3/10/20 colonoscopy: skin tags and seton intact, few rare mucosal scars in rectum with remainder of rectum and colon normal. Terminal ileum normal. Biopsies of terminal ileum normal, biopsies of rectum with minimal architectural distortion  3/10/20 MRE:  Mild postcontrast enhancement of the terminal ileum wall likely due to chronic inflammatory change without wall thickening or intraluminal stricture. Perirectal fistulas are better visualized on prior dedicated anal sphincter MRI noting interval seton placement.    Pertinent Labs:  Lab Results   Component Value Date    SEDRATE 2 04/24/2017    CRP 0.4 01/27/2020     Lab Results   Component Value Date    TTGIGA 7 04/24/2017     04/24/2017     No results found for: TSH, FREET4  Lab Results   Component Value Date    VHXVJWLO07VM 26 (L) 05/15/2019    UJCZMYSV95 296 05/15/2019     Lab Results   Component Value Date    HEPBSAG Negative 05/15/2019    HEPBCAB Negative 05/15/2019     No results found for: [id number]  Lab Results   Component Value Date    NIL 0.050 01/27/2020    MITOGENNIL 6.520 01/27/2020    TSPOTSCREN See result image under hyperlink 11/29/2018     Lab Results   Component Value Date    TPTMINTERP SEE BELOW 04/24/2017     Therapeutic Drug Monitoring Labs:  1/8/2018: Trough [facility] ADA Levels 2.5, ABs 57 (low) (humira 40 mg SC every 14 days)  5/17/2018: [facility] Trough Humira Level 15, ABs 30 (low) (humira 40 mg SC every 7 days)  11/16/2018: Trough [facility] Humira Level 20, ABs 26 (low) (humira 40 mg SC  every 7 days)  1/2/20 trough humira level 13/Abs 29 (low) (humira 40 mg SC every 7 days)    Prior IBD Meds:  Prednisone  Pentasa  MTX 12.5 mg PO weekly (started 8/2017, stopped 8/13/2018 due to elevated LFTs)    Vaccinations:  Lab Results   Component Value Date    HEPBSAB Positive (A) 08/13/2018     Lab Results   Component Value Date    HEPAIGG Positive (A) 08/13/2018     Lab Results   Component Value Date    VARICELLAZOS 2.98 (H) 04/24/2017    VARICELLAINT Positive (A) 04/24/2017     Immunization History   Administered Date(s) Administered    Hepatitis A / Hepatitis B 01/25/2018, 02/22/2018, 07/26/2018    Pneumococcal Conjugate - 13 Valent 04/24/2017    Pneumococcal Polysaccharide - 23 Valent 07/25/2017    Tdap 04/24/2017     Influenza (inactive): 2019, due this fall, pt reminded  Pneumococcal PCV 13: 4/24/17  Pneumococcal PCV 23: 7/25/17  Tetanus (TdaP): 4/24/17  HPV (males and females ages 19-27 yo):      Meningococcal:  UTD with childhood vaccines  Hepatitis A/B:  1/25/18, 2/22/18, 7/26/18, immune  MMR (live vaccine): UTD with childhood vaccines  Chickenpox status/Varicella (live vaccine): immune  Shingrix: will schedule    Review of Systems   Constitutional: Negative for chills, fever and weight loss.   Eyes: Negative for pain and redness.   Respiratory: Negative for cough and shortness of breath.    Cardiovascular: Negative for chest pain.   Gastrointestinal: Negative for abdominal pain, heartburn, nausea and vomiting.   Genitourinary: Negative for hematuria.   Musculoskeletal: Negative for back pain.   Skin: Negative for rash.   Psychiatric/Behavioral: Negative for depression. The patient is not nervous/anxious.      All Medical History/Surgical History/Family History/Social History/Allergies have been reviewed and updated in EMR    Review of patient's allergies indicates:  No Known Allergies    Outpatient Medications Marked as Taking for the 7/8/20 encounter (Office Visit) with Bubba Spence MD    Medication Sig Dispense Refill    adalimumab (HUMIRA) PnKt injection Inject 0.8 mLs (40 mg total) into the skin every 7 days. 4 pen 5    cyanocobalamin (VITAMIN B-12) 1000 MCG tablet Take 100 mcg by mouth once daily.      vitamin D (VITAMIN D3) 1000 units Tab Take 2,000 Units by mouth once daily.       Labs:   Lab Results   Component Value Date    WBC 7.07 01/27/2020    HGB 13.7 (L) 01/27/2020    HCT 43.6 01/27/2020    MCV 93 01/27/2020     (H) 01/27/2020     Lab Results   Component Value Date    CREATININE 0.8 12/30/2019    ALBUMIN 3.6 12/30/2019    BILITOT 0.9 12/30/2019    ALKPHOS 65 12/30/2019    AST 16 12/30/2019    ALT 12 12/30/2019       Assessment/Plan:  Joss Matute is a 34 y.o. male with Crohn's disease (ileum, rectal ulcer, recurrent perianal fistula/abscess- 12/96, 3/2017, 12/2019, setons last removed 6/2020) who continues on Humira 40 mg SC weekly.  Since last visit he had a C times removed and is doing well with no recurrent perianal abscess and feels that the fistulous closing.  He is otherwise doing well on his current regimen and we will plan for restaging disease in spring/summer of 2021.    # Crohn's disease (ileum, rectal ulcer, recurrent perianal fistula/abscess- 12/96, 3/2017, 12/2019)  - continue Humira 40 mg SC every weekly  - stool calprotectin March 2021  - colonoscopy summer/fall 2021  - vitamin B12 (5/15/19 vit B12 296)- continue Vit B12 1000 mcg daily, repeat vit B12 now  - ex-smoker:  Quit fall 2015, discussed risks of smoking and CD with patient in past  - CRC Risk: personal h/o of sporadic adenoma in 1998, proctitis/ileal disease with symptom onset in 1998; surveillance every 5 years   - drug monitoring labs: CBC/CMP every 6 months (due now), TPMT (normal 4/2017), TB quantiferon (negative 1/2020, repeat 1/2021), Hep B testing (HBsAg, HBcAb negative 5/2019, repeat now)  - TDM:  Repeat trough levels/abs (last one 1/2020) if recurrent abscess or yearly whichever comes  first    # IBD specific health maintenance:  Counseled on reducing alcohol intake   Skin exam yearly - recommended  Risk for osteopenia/osteoporosis-none  Vitamin D (5/2019 vit D 26)- continue  vit D3 1000 IU/day, repeat vit D now  Vaccines: no live vaccines, shingrix will be scheduled     Follow up: 6 mos video visit     The patient location is: Home  The chief complaint leading to consultation is: Crohn's disease    Visit type: audiovisual    Face to Face time with patient: 20 min  25 minutes of total time spent on the encounter, which includes face to face time and non-face to face time preparing to see the patient (eg, review of tests), Obtaining and/or reviewing separately obtained history, Documenting clinical information in the electronic or other health record, Independently interpreting results (not separately reported) and communicating results to the patient/family/caregiver, or Care coordination (not separately reported).     Each patient to whom he or she provides medical services by telemedicine is:  (1) informed of the relationship between the physician and patient and the respective role of any other health care provider with respect to management of the patient; and (2) notified that he or she may decline to receive medical services by telemedicine and may withdraw from such care at any time.    Bubba Spence MD  Department of Gastroenterology  Medical Director, Inflammatory Bowel Disease

## 2020-07-13 ENCOUNTER — LAB VISIT (OUTPATIENT)
Dept: LAB | Facility: HOSPITAL | Age: 35
End: 2020-07-13
Attending: INTERNAL MEDICINE
Payer: COMMERCIAL

## 2020-07-13 DIAGNOSIS — K50.113 CROHN'S DISEASE OF LARGE INTESTINE WITH FISTULA: ICD-10-CM

## 2020-07-13 LAB
25(OH)D3+25(OH)D2 SERPL-MCNC: 26 NG/ML (ref 30–96)
ALBUMIN SERPL BCP-MCNC: 4.2 G/DL (ref 3.5–5.2)
ALP SERPL-CCNC: 38 U/L (ref 55–135)
ALT SERPL W/O P-5'-P-CCNC: 54 U/L (ref 10–44)
ANION GAP SERPL CALC-SCNC: 6 MMOL/L (ref 8–16)
AST SERPL-CCNC: 49 U/L (ref 10–40)
BASOPHILS # BLD AUTO: 0.08 K/UL (ref 0–0.2)
BASOPHILS NFR BLD: 1.3 % (ref 0–1.9)
BILIRUB SERPL-MCNC: 0.6 MG/DL (ref 0.1–1)
BUN SERPL-MCNC: 12 MG/DL (ref 6–20)
CALCIUM SERPL-MCNC: 9.3 MG/DL (ref 8.7–10.5)
CHLORIDE SERPL-SCNC: 105 MMOL/L (ref 95–110)
CO2 SERPL-SCNC: 29 MMOL/L (ref 23–29)
CREAT SERPL-MCNC: 0.8 MG/DL (ref 0.5–1.4)
DIFFERENTIAL METHOD: ABNORMAL
EOSINOPHIL # BLD AUTO: 0.8 K/UL (ref 0–0.5)
EOSINOPHIL NFR BLD: 13.1 % (ref 0–8)
ERYTHROCYTE [DISTWIDTH] IN BLOOD BY AUTOMATED COUNT: 11.9 % (ref 11.5–14.5)
EST. GFR  (AFRICAN AMERICAN): >60 ML/MIN/1.73 M^2
EST. GFR  (NON AFRICAN AMERICAN): >60 ML/MIN/1.73 M^2
GLUCOSE SERPL-MCNC: 78 MG/DL (ref 70–110)
HBV CORE AB SERPL QL IA: NEGATIVE
HBV SURFACE AG SERPL QL IA: NEGATIVE
HCT VFR BLD AUTO: 42.4 % (ref 40–54)
HGB BLD-MCNC: 13.9 G/DL (ref 14–18)
IMM GRANULOCYTES # BLD AUTO: 0 K/UL (ref 0–0.04)
IMM GRANULOCYTES NFR BLD AUTO: 0 % (ref 0–0.5)
LYMPHOCYTES # BLD AUTO: 2.6 K/UL (ref 1–4.8)
LYMPHOCYTES NFR BLD: 42.5 % (ref 18–48)
MCH RBC QN AUTO: 29.8 PG (ref 27–31)
MCHC RBC AUTO-ENTMCNC: 32.8 G/DL (ref 32–36)
MCV RBC AUTO: 91 FL (ref 82–98)
MONOCYTES # BLD AUTO: 0.6 K/UL (ref 0.3–1)
MONOCYTES NFR BLD: 9 % (ref 4–15)
NEUTROPHILS # BLD AUTO: 2.1 K/UL (ref 1.8–7.7)
NEUTROPHILS NFR BLD: 34.1 % (ref 38–73)
NRBC BLD-RTO: 0 /100 WBC
PLATELET # BLD AUTO: 333 K/UL (ref 150–350)
PMV BLD AUTO: 10.1 FL (ref 9.2–12.9)
POTASSIUM SERPL-SCNC: 4.3 MMOL/L (ref 3.5–5.1)
PROT SERPL-MCNC: 7.5 G/DL (ref 6–8.4)
RBC # BLD AUTO: 4.67 M/UL (ref 4.6–6.2)
SODIUM SERPL-SCNC: 140 MMOL/L (ref 136–145)
VIT B12 SERPL-MCNC: 346 PG/ML (ref 210–950)
WBC # BLD AUTO: 6.09 K/UL (ref 3.9–12.7)

## 2020-07-13 PROCEDURE — 85025 COMPLETE CBC W/AUTO DIFF WBC: CPT

## 2020-07-13 PROCEDURE — 86704 HEP B CORE ANTIBODY TOTAL: CPT

## 2020-07-13 PROCEDURE — 36415 COLL VENOUS BLD VENIPUNCTURE: CPT

## 2020-07-13 PROCEDURE — 82306 VITAMIN D 25 HYDROXY: CPT

## 2020-07-13 PROCEDURE — 80053 COMPREHEN METABOLIC PANEL: CPT

## 2020-07-13 PROCEDURE — 87340 HEPATITIS B SURFACE AG IA: CPT

## 2020-07-13 PROCEDURE — 82607 VITAMIN B-12: CPT

## 2020-07-14 ENCOUNTER — TELEPHONE (OUTPATIENT)
Dept: GASTROENTEROLOGY | Facility: CLINIC | Age: 35
End: 2020-07-14

## 2020-07-14 DIAGNOSIS — R79.89 ELEVATED LFTS: ICD-10-CM

## 2020-07-14 DIAGNOSIS — K50.113 CROHN'S DISEASE OF LARGE INTESTINE WITH FISTULA: Primary | ICD-10-CM

## 2020-07-14 NOTE — TELEPHONE ENCOUNTER
----- Message from Bubba Spence MD sent at 7/14/2020  8:32 AM CDT -----  LFTs increased, vit D is low    SS  ----- Message -----  From: [employer] [facility] Interface  Sent: 7/13/2020  11:27 AM CDT  To: Bubba Spence MD

## 2020-07-14 NOTE — TELEPHONE ENCOUNTER
Called & spoke to pt  - Informed him of lab results significant for elevated LFTs & low vitamin D  - Currently taking vitamin D 1,000 IU/ QD; will increase to vitamin D 5,000 IU/ QD  - Denies weight gain  - Approximately 1.5 weeks ago cut back from 5 alcoholic drinks/ QD to 0-2 drinks/ QD  - Recently started taking Oneal Man Vitamin, Amino Acids, & Creatine   - Will discuss w/ Dr. Spence

## 2020-07-21 ENCOUNTER — OFFICE VISIT (OUTPATIENT)
Dept: SURGERY | Facility: CLINIC | Age: 35
End: 2020-07-21
Payer: COMMERCIAL

## 2020-07-21 VITALS
BODY MASS INDEX: 25.8 KG/M2 | WEIGHT: 194.69 LBS | HEIGHT: 73 IN | SYSTOLIC BLOOD PRESSURE: 126 MMHG | DIASTOLIC BLOOD PRESSURE: 71 MMHG | HEART RATE: 101 BPM

## 2020-07-21 DIAGNOSIS — K61.2 ABSCESS OF ANAL AND RECTAL REGIONS: Primary | ICD-10-CM

## 2020-07-21 PROCEDURE — 99213 OFFICE O/P EST LOW 20 MIN: CPT | Mod: S$GLB,,, | Performed by: COLON & RECTAL SURGERY

## 2020-07-21 PROCEDURE — 99999 PR PBB SHADOW E&M-EST. PATIENT-LVL III: CPT | Mod: PBBFAC,,, | Performed by: COLON & RECTAL SURGERY

## 2020-07-21 PROCEDURE — 3008F PR BODY MASS INDEX (BMI) DOCUMENTED: ICD-10-PCS | Mod: CPTII,S$GLB,, | Performed by: COLON & RECTAL SURGERY

## 2020-07-21 PROCEDURE — 99999 PR PBB SHADOW E&M-EST. PATIENT-LVL III: ICD-10-PCS | Mod: PBBFAC,,, | Performed by: COLON & RECTAL SURGERY

## 2020-07-21 PROCEDURE — 99213 PR OFFICE/OUTPT VISIT, EST, LEVL III, 20-29 MIN: ICD-10-PCS | Mod: S$GLB,,, | Performed by: COLON & RECTAL SURGERY

## 2020-07-21 PROCEDURE — 3008F BODY MASS INDEX DOCD: CPT | Mod: CPTII,S$GLB,, | Performed by: COLON & RECTAL SURGERY

## 2020-07-21 NOTE — PROGRESS NOTES
HPI:  Joss Matute is a 34 y.o. male with history of Crohn's colitis and anal fistulas since age 12.  He is currently on Humira.  In December 2019 he was admitted with an perirectal abscess and underwent I&D along with seton placement.  This consisted of a seton between two external counter incisions.  There was no evidence of an internal opening.     He states that he is much improved since this procedure.  His anal pain is gone.  There is no further swelling.  He continues to have some drainage. 3 BMs a day. The seton was removed in clinic on 6/25/2020.       The patient does drink 5+ alcoholic drinks per day.     Interval History:  Today the patient is doing well and is here for a scheduled f/u visit. He isn't having pain and he doesn't think there has been any drainage.      Past Medical History:   Diagnosis Date    Crohn's disease of large intestine with fistula 10/10/2018    Ex-smoker 1/24/2018    Immunosuppressed status 1/27/2020    Penile venereal warts 1/25/2018    Vitamin D deficiency 6/26/2017        Past Surgical History:   Procedure Laterality Date    ABSCESS DRAINAGE      ANAL FISTULOTOMY      COLONOSCOPY      COLONOSCOPY N/A 4/7/2017    Procedure: COLONOSCOPY;  Surgeon: Bubba Spence MD;  Location: [facility] ([address] [place] [address]);  Service: Endoscopy;  Laterality: N/A;    COLONOSCOPY N/A 10/10/2018    Procedure: COLONOSCOPY;  Surgeon: Bubba Spence MD;  Location: [address]);  Service: Endoscopy;  Laterality: N/A;  schedule as 45 minute case--due Fall 2018    COLONOSCOPY N/A 3/10/2020    Procedure: COLONOSCOPY;  Surgeon: Bubba Spence MD;  Location: [facility] ([address] [place] [address]);  Service: Endoscopy;  Laterality: N/A;  schedule 40 minute case  March 2020        EXAMINATION UNDER ANESTHESIA N/A 12/30/2019    Procedure: Exam under anesthesia- possible seton;  Surgeon: DENISE Monterroso MD;  Location: [address];  Service: Colon and Rectal;  Laterality: N/A;    INCISION AND DRAINAGE OF  ABSCESS  2019    Procedure: INCISION AND DRAINAGE, ABSCESS;  Surgeon: DENISE Monterroso MD;  Location: [facility] OR 2ND FLR;  Service: Colon and Rectal;;    INCISION OF PERIRECTAL ABSCESS  2019    Procedure: INCISION, ABSCESS, PERIRECTAL;  Surgeon: DENISE Monterroso MD;  Location: [facility] OR 2ND FLR;  Service: Colon and Rectal;;    INSERTION OF SETON STITCH N/A 2019    Procedure: PLACEMENT, SETON STITCH;  Surgeon: DENISE Monterroso MD;  Location: [facility] OR 2ND FLR;  Service: Colon and Rectal;  Laterality: N/A;    UPPER GASTROINTESTINAL ENDOSCOPY         Review of patient's allergies indicates:  No Known Allergies    Family History   Problem Relation Age of Onset    ALS Paternal Grandfather     Celiac disease Neg Hx     Cirrhosis Neg Hx     Colon cancer Neg Hx     Colon polyps Neg Hx     Crohn's disease Neg Hx     Cystic fibrosis Neg Hx     Esophageal cancer Neg Hx     Hemochromatosis Neg Hx     Inflammatory bowel disease Neg Hx     Irritable bowel syndrome Neg Hx     Liver cancer Neg Hx     Liver disease Neg Hx     Rectal cancer Neg Hx     Stomach cancer Neg Hx     Ulcerative colitis Neg Hx     Roby's disease Neg Hx     Lupus Neg Hx     Multiple sclerosis Neg Hx     Tuberculosis Neg Hx     Rheum arthritis Neg Hx     Scleroderma Neg Hx     Lymphoma Neg Hx        Social History     Socioeconomic History    Marital status:      Spouse name: Not on file    Number of children: Not on file    Years of education: Not on file    Highest education level: Not on file   Occupational History    Not on file   Social Needs    Financial resource strain: Not on file    Food insecurity     Worry: Not on file     Inability: Not on file    Transportation needs     Medical: Not on file     Non-medical: Not on file   Tobacco Use    Smoking status: Former Smoker     Types: Cigarettes     Quit date: 10/24/2015     Years since quittin.7    Smokeless tobacco: Never Used   Substance and  "Sexual Activity    Alcohol use: Yes     Comment: 12//week    Drug use: No    Sexual activity: Yes     Partners: Female     Comment:     Lifestyle    Physical activity     Days per week: Not on file     Minutes per session: Not on file    Stress: Not on file   Relationships    Social connections     Talks on phone: Not on file     Gets together: Not on file     Attends Zoroastrianism service: Not on file     Active member of club or organization: Not on file     Attends meetings of clubs or organizations: Not on file     Relationship status: Not on file   Other Topics Concern    Not on file   Social History Narrative     at [employer] [facility] [place]        ROS:  GENERAL: No fever, chills, fatigability or weight loss.  Integument: No rashes, redness, icterus  CHEST: Denies DAY, cyanosis, wheezing, cough and sputum production.  CARDIOVASCULAR: Denies chest pain, PND, orthopnea or reduced exercise tolerance.  GI: Denies abd pain, dysphagia, nausea, vomiting, no hematemesis   : Denies burning on urination, no hematuria, no bacteriuria  MSK: No deformities, swelling, joint pain swelling  Neurologic: No HAs, seizures, weakness, paresthesias, gait problems    PE:  General appearance NAD  /71 (BP Location: Left arm, Patient Position: Sitting, BP Method: Large (Automatic))   Pulse 101   Ht 6' 1" (1.854 m)   Wt 88.3 kg (194 lb 10.7 oz)   BMI 25.68 kg/m²   Sclera/ Skin anicteric  AT NC EOMI  Neck supple trachea midline   Chest symmetric, nl excursion, no retractions, breathing comfortably  Abdomen  ND soft NT.   EXT - no CCE  Neuro:  Mood/ affect nl, alert and oriented x 3, moves all ext's, gait nl    Rectal  Inspection       Wounds healed  CAMACHO nl tone,  No mass.  No tenderness      Assessment:  33 yo male with Crohn's and h/o fistula-in-ano with seton removal in June.    Wounds/ fistula healed.    Functionally continent    Plan:  RTC prn  Continued Crohn's treatment per Dr Spence      "

## 2020-08-11 ENCOUNTER — LAB VISIT (OUTPATIENT)
Dept: LAB | Facility: HOSPITAL | Age: 35
End: 2020-08-11
Attending: INTERNAL MEDICINE
Payer: COMMERCIAL

## 2020-08-11 DIAGNOSIS — R79.89 ELEVATED LFTS: ICD-10-CM

## 2020-08-11 DIAGNOSIS — K50.113 CROHN'S DISEASE OF LARGE INTESTINE WITH FISTULA: ICD-10-CM

## 2020-08-11 LAB
ALBUMIN SERPL BCP-MCNC: 4.5 G/DL (ref 3.5–5.2)
ALP SERPL-CCNC: 34 U/L (ref 55–135)
ALT SERPL W/O P-5'-P-CCNC: 32 U/L (ref 10–44)
AST SERPL-CCNC: 20 U/L (ref 10–40)
BILIRUB DIRECT SERPL-MCNC: 0.3 MG/DL (ref 0.1–0.3)
BILIRUB SERPL-MCNC: 0.7 MG/DL (ref 0.1–1)
PROT SERPL-MCNC: 7.7 G/DL (ref 6–8.4)

## 2020-08-11 PROCEDURE — 36415 COLL VENOUS BLD VENIPUNCTURE: CPT

## 2020-08-11 PROCEDURE — 80076 HEPATIC FUNCTION PANEL: CPT

## 2020-08-22 ENCOUNTER — TELEPHONE (OUTPATIENT)
Dept: PHARMACY | Facility: CLINIC | Age: 35
End: 2020-08-22

## 2020-09-15 ENCOUNTER — TELEPHONE (OUTPATIENT)
Dept: GASTROENTEROLOGY | Facility: HOSPITAL | Age: 35
End: 2020-09-15

## 2020-09-15 NOTE — TELEPHONE ENCOUNTER
Pt called my direct line  dull pain 1-2/10 around the Setons - started last night  noticeable with activity   No fever.  No discharge/odor  Just feels swollen  Not sure if he needed to reach out to us or Dr Monterroso    Explained I will discuss with Dr Spence and will route to Dr Monterroso as well  And someone will be in touch

## 2020-09-16 ENCOUNTER — OFFICE VISIT (OUTPATIENT)
Dept: SURGERY | Facility: CLINIC | Age: 35
End: 2020-09-16
Payer: COMMERCIAL

## 2020-09-16 VITALS
WEIGHT: 187.38 LBS | BODY MASS INDEX: 24.72 KG/M2 | HEART RATE: 85 BPM | DIASTOLIC BLOOD PRESSURE: 75 MMHG | SYSTOLIC BLOOD PRESSURE: 140 MMHG

## 2020-09-16 DIAGNOSIS — K62.89 ANAL OR RECTAL PAIN: Primary | ICD-10-CM

## 2020-09-16 PROCEDURE — 99213 OFFICE O/P EST LOW 20 MIN: CPT | Mod: S$GLB,,, | Performed by: COLON & RECTAL SURGERY

## 2020-09-16 PROCEDURE — 3008F BODY MASS INDEX DOCD: CPT | Mod: CPTII,S$GLB,, | Performed by: COLON & RECTAL SURGERY

## 2020-09-16 PROCEDURE — 99999 PR PBB SHADOW E&M-EST. PATIENT-LVL III: ICD-10-PCS | Mod: PBBFAC,,, | Performed by: COLON & RECTAL SURGERY

## 2020-09-16 PROCEDURE — 99213 PR OFFICE/OUTPT VISIT, EST, LEVL III, 20-29 MIN: ICD-10-PCS | Mod: S$GLB,,, | Performed by: COLON & RECTAL SURGERY

## 2020-09-16 PROCEDURE — 3008F PR BODY MASS INDEX (BMI) DOCUMENTED: ICD-10-PCS | Mod: CPTII,S$GLB,, | Performed by: COLON & RECTAL SURGERY

## 2020-09-16 PROCEDURE — 99999 PR PBB SHADOW E&M-EST. PATIENT-LVL III: CPT | Mod: PBBFAC,,, | Performed by: COLON & RECTAL SURGERY

## 2020-09-16 RX ORDER — AMOXICILLIN AND CLAVULANATE POTASSIUM 875; 125 MG/1; MG/1
1 TABLET, FILM COATED ORAL EVERY 12 HOURS
Qty: 28 TABLET | Refills: 0 | Status: SHIPPED | OUTPATIENT
Start: 2020-09-16 | End: 2020-10-12

## 2020-09-16 NOTE — PROGRESS NOTES
HPI:  Joss Matute is a 34 y.o. male with history of anal pain for 24 hours.  No d/c.  Setons removed 7-       Past Medical History:   Diagnosis Date    Crohn's disease of large intestine with fistula 10/10/2018    Ex-smoker 1/24/2018    Immunosuppressed status 1/27/2020    Penile venereal warts 1/25/2018    Vitamin D deficiency 6/26/2017        Past Surgical History:   Procedure Laterality Date    ABSCESS DRAINAGE      ANAL FISTULOTOMY      COLONOSCOPY      COLONOSCOPY N/A 4/7/2017    Procedure: COLONOSCOPY;  Surgeon: Bubba Spence MD;  Location: [facility] ENDO (4TH FLR);  Service: Endoscopy;  Laterality: N/A;    COLONOSCOPY N/A 10/10/2018    Procedure: COLONOSCOPY;  Surgeon: Bubba Spence MD;  Location: [facility] ENDO (4TH FLR);  Service: Endoscopy;  Laterality: N/A;  schedule as 45 minute case--due Fall 2018    COLONOSCOPY N/A 3/10/2020    Procedure: COLONOSCOPY;  Surgeon: Bubba Spence MD;  Location: [facility] ENDO (4TH FLR);  Service: Endoscopy;  Laterality: N/A;  schedule 40 minute case  March 2020        EXAMINATION UNDER ANESTHESIA N/A 12/30/2019    Procedure: Exam under anesthesia- possible seton;  Surgeon: DENISE Monterroso MD;  Location: [facility] OR 2ND FLR;  Service: Colon and Rectal;  Laterality: N/A;    INCISION AND DRAINAGE OF ABSCESS  12/30/2019    Procedure: INCISION AND DRAINAGE, ABSCESS;  Surgeon: DENISE Monterroso MD;  Location: [facility] OR 2ND FLR;  Service: Colon and Rectal;;    INCISION OF PERIRECTAL ABSCESS  12/30/2019    Procedure: INCISION, ABSCESS, PERIRECTAL;  Surgeon: DENISE Monterroso MD;  Location: [facility] OR 2ND FLR;  Service: Colon and Rectal;;    INSERTION OF SETON STITCH N/A 12/30/2019    Procedure: PLACEMENT, SETON STITCH;  Surgeon: DENISE Monterroso MD;  Location: [facility] OR 2ND FLR;  Service: Colon and Rectal;  Laterality: N/A;    UPPER GASTROINTESTINAL ENDOSCOPY         Review of patient's allergies indicates:  No Known Allergies    Family History   Problem Relation Age of  Onset    ALS Paternal Grandfather     Celiac disease Neg Hx     Cirrhosis Neg Hx     Colon cancer Neg Hx     Colon polyps Neg Hx     Crohn's disease Neg Hx     Cystic fibrosis Neg Hx     Esophageal cancer Neg Hx     Hemochromatosis Neg Hx     Inflammatory bowel disease Neg Hx     Irritable bowel syndrome Neg Hx     Liver cancer Neg Hx     Liver disease Neg Hx     Rectal cancer Neg Hx     Stomach cancer Neg Hx     Ulcerative colitis Neg Hx     Roby's disease Neg Hx     Lupus Neg Hx     Multiple sclerosis Neg Hx     Tuberculosis Neg Hx     Rheum arthritis Neg Hx     Scleroderma Neg Hx     Lymphoma Neg Hx        Social History     Socioeconomic History    Marital status:      Spouse name: Not on file    Number of children: Not on file    Years of education: Not on file    Highest education level: Not on file   Occupational History    Not on file   Social Needs    Financial resource strain: Not on file    Food insecurity     Worry: Not on file     Inability: Not on file    Transportation needs     Medical: Not on file     Non-medical: Not on file   Tobacco Use    Smoking status: Former Smoker     Types: Cigarettes     Quit date: 10/24/2015     Years since quittin.9    Smokeless tobacco: Never Used   Substance and Sexual Activity    Alcohol use: Yes     Comment: 12//week    Drug use: No    Sexual activity: Yes     Partners: Female     Comment:     Lifestyle    Physical activity     Days per week: Not on file     Minutes per session: Not on file    Stress: Not on file   Relationships    Social connections     Talks on phone: Not on file     Gets together: Not on file     Attends [employer]ist service: Not on file     Active member of club or organization: Not on file     Attends meetings of clubs or organizations: Not on file     Relationship status: Not on file   Other Topics Concern    Not on file   Social History Narrative     at [employer] [facility] [place]         ROS:  GENERAL: No fever, chills, fatigability or weight loss.  Integument: No rashes, redness, icterus  CHEST: Denies DAY, cyanosis, wheezing, cough and sputum production.  CARDIOVASCULAR: Denies chest pain, PND, orthopnea or reduced exercise tolerance.  GI: Denies abd pain, dysphagia, nausea, vomiting, no hematemesis   : Denies burning on urination, no hematuria, no bacteriuria  MSK: No deformities, swelling, joint pain swelling  Neurologic: No HAs, seizures, weakness, paresthesias, gait problems    PE:  General appearance well non toxic  BP (!) 140/75 (BP Location: Left arm, Patient Position: Sitting, BP Method: Medium (Automatic))   Pulse 85   Wt 85 kg (187 lb 6.3 oz)   BMI 24.72 kg/m²   Sclera/ Skin anicteric  AT NC EOMI  Neck supple trachea midline   Chest symmetric, nl excursion, no retractions, breathing comfortably  EXT - no CCE  Neuro:  Mood/ affect nl, alert and oriented x 3, moves all ext's, gait nl    Rectal  Inspection       Anal tags  Wounds healed.  No mass.  No fluctuance.        Assessment:  ? Early abscess - no drainable abscess on exam    Plan:  augmentin for 2 weeks  Check labs.    OV 1 week

## 2020-09-17 ENCOUNTER — HOSPITAL ENCOUNTER (OUTPATIENT)
Facility: HOSPITAL | Age: 35
Discharge: HOME | End: 2020-09-18
Attending: EMERGENCY MEDICINE | Admitting: COLON & RECTAL SURGERY
Payer: COMMERCIAL

## 2020-09-17 ENCOUNTER — TELEPHONE (OUTPATIENT)
Dept: SURGERY | Facility: CLINIC | Age: 35
End: 2020-09-17

## 2020-09-17 DIAGNOSIS — K61.0 PERIANAL ABSCESS: Primary | ICD-10-CM

## 2020-09-17 DIAGNOSIS — K62.89 ANAL OR RECTAL PAIN: Primary | ICD-10-CM

## 2020-09-17 DIAGNOSIS — K61.1 PERIRECTAL ABSCESS: ICD-10-CM

## 2020-09-17 LAB
BUN SERPL-MCNC: 6 MG/DL (ref 6–30)
CHLORIDE SERPL-SCNC: 103 MMOL/L (ref 95–110)
CREAT SERPL-MCNC: 0.8 MG/DL (ref 0.5–1.4)
GLUCOSE SERPL-MCNC: 95 MG/DL (ref 70–110)
HCT VFR BLD CALC: 44 %PCV (ref 36–54)
POC IONIZED CALCIUM: 1.22 MMOL/L (ref 1.06–1.42)
POC TCO2 (MEASURED): 27 MMOL/L (ref 23–29)
POTASSIUM BLD-SCNC: 3.8 MMOL/L (ref 3.5–5.1)
SAMPLE: NORMAL
SODIUM BLD-SCNC: 139 MMOL/L (ref 136–145)

## 2020-09-17 PROCEDURE — 96374 THER/PROPH/DIAG INJ IV PUSH: CPT

## 2020-09-17 PROCEDURE — 99285 EMERGENCY DEPT VISIT HI MDM: CPT | Mod: 25

## 2020-09-17 PROCEDURE — 99285 PR EMERGENCY DEPT VISIT,LEVEL V: ICD-10-PCS | Mod: ,,, | Performed by: EMERGENCY MEDICINE

## 2020-09-17 PROCEDURE — 25500020 PHARM REV CODE 255: Performed by: EMERGENCY MEDICINE

## 2020-09-17 PROCEDURE — 25000003 PHARM REV CODE 250: Performed by: STUDENT IN AN ORGANIZED HEALTH CARE EDUCATION/TRAINING PROGRAM

## 2020-09-17 PROCEDURE — 63600175 PHARM REV CODE 636 W HCPCS: Performed by: STUDENT IN AN ORGANIZED HEALTH CARE EDUCATION/TRAINING PROGRAM

## 2020-09-17 PROCEDURE — 99285 EMERGENCY DEPT VISIT HI MDM: CPT | Mod: ,,, | Performed by: EMERGENCY MEDICINE

## 2020-09-17 RX ORDER — HYDROCODONE BITARTRATE AND ACETAMINOPHEN 5; 325 MG/1; MG/1
1 TABLET ORAL
Status: COMPLETED | OUTPATIENT
Start: 2020-09-17 | End: 2020-09-17

## 2020-09-17 RX ORDER — HYDROMORPHONE HYDROCHLORIDE 1 MG/ML
0.5 INJECTION, SOLUTION INTRAMUSCULAR; INTRAVENOUS; SUBCUTANEOUS
Status: COMPLETED | OUTPATIENT
Start: 2020-09-17 | End: 2020-09-17

## 2020-09-17 RX ADMIN — HYDROCODONE BITARTRATE AND ACETAMINOPHEN 1 TABLET: 5; 325 TABLET ORAL at 04:09

## 2020-09-17 RX ADMIN — IOHEXOL 100 ML: 350 INJECTION, SOLUTION INTRAVENOUS at 09:09

## 2020-09-17 RX ADMIN — HYDROMORPHONE HYDROCHLORIDE 0.5 MG: 1 INJECTION, SOLUTION INTRAMUSCULAR; INTRAVENOUS; SUBCUTANEOUS at 11:09

## 2020-09-17 RX ADMIN — HYDROCODONE BITARTRATE AND ACETAMINOPHEN 1 TABLET: 5; 325 TABLET ORAL at 08:09

## 2020-09-17 RX ADMIN — IOHEXOL 30 ML: 350 INJECTION, SOLUTION INTRAVENOUS at 09:09

## 2020-09-17 NOTE — TELEPHONE ENCOUNTER
----- Message from Kathleen Swain MA sent at 9/17/2020  1:55 PM CDT -----  Contact: [phone]   Returning a call to speak with Joaquin. Pt said joaquin will know what the call is regarding

## 2020-09-17 NOTE — TELEPHONE ENCOUNTER
----- Message from Leslee Fernando sent at 9/17/2020  1:29 PM CDT -----  Joss Matute calling regarding Patient Advice (message), stated he is having rectal pain around the area where he had his procedure in December.  He stated he need to know if you can see him today or do he need to go to the ED.  The pain is when he stand and walk.  He stated the pain is about 7 or 8 out of 10 when walking and about a 2 or 3 out of 10 when he is laying down.  This started about 2 days ago and Dr. Monterroso advised him to call to let know when the pain increase. Call back [phone].

## 2020-09-17 NOTE — ED TRIAGE NOTES
Pt from home. Pt states he has an abscess near his rectum. Pt saw surgery yesterday, he was prescribed abx and told if it gets worse to call his office. Pt states surgery stated it could not be drained because he could not view the abscess. Surgeon is in surgery and could not see pt. Pt states since yesterday the abscess has gotten larger and more painful and thinks it may be visible now. Rates pain 10/10 when moving.

## 2020-09-17 NOTE — ED PROVIDER NOTES
Encounter Date: 9/17/2020       History     Chief Complaint   Patient presents with    Rectal Pain     seen by surg yest and pain has increased     33yo M with recurrent perianal abscess, perianal fistula s/p seton removed 2 months ago presents for anal pain for the past 2 days. He was seen by Dr. Monterroso of colorectal surgery yesterday, who did not find any drainable abscess and started him on amoxicillin. He presents today with worsening, severe pain and feels like it has grown in size. He has not taken any pain medication today. The pain is so bad he can't sit down. He denies fever, bloody stools, dark or red urine        Review of patient's allergies indicates:  No Known Allergies  Past Medical History:   Diagnosis Date    Crohn's disease of large intestine with fistula 10/10/2018    Ex-smoker 1/24/2018    Immunosuppressed status 1/27/2020    Penile venereal warts 1/25/2018    Vitamin D deficiency 6/26/2017     Past Surgical History:   Procedure Laterality Date    ABSCESS DRAINAGE      ANAL FISTULOTOMY      COLONOSCOPY      COLONOSCOPY N/A 4/7/2017    Procedure: COLONOSCOPY;  Surgeon: Bubba Spence MD;  Location: [facility] ([address] [place] [address]);  Service: Endoscopy;  Laterality: N/A;    COLONOSCOPY N/A 10/10/2018    Procedure: COLONOSCOPY;  Surgeon: Bubba Spence MD;  [facility]  Service: Endoscopy;  Laterality: N/A;  schedule as 45 minute case--due Fall 2018    COLONOSCOPY N/A 3/10/2020    Procedure: COLONOSCOPY;  Surgeon: Bubba Spence MD;  [facility]  Service: Endoscopy;  Laterality: N/A;  schedule 40 minute case  March 2020        EXAMINATION UNDER ANESTHESIA N/A 12/30/2019    Procedure: Exam under anesthesia- possible seton;  Surgeon: DENISE Monterroso MD;  [facility];  Service: Colon and Rectal;  Laterality: N/A;    INCISION AND DRAINAGE OF ABSCESS  12/30/2019    Procedure: INCISION AND DRAINAGE, ABSCESS;  Surgeon: DENISE Monterroso MD;   Location: [facility] OR 2ND FLR;  Service: Colon and Rectal;;    INCISION OF PERIRECTAL ABSCESS  2019    Procedure: INCISION, ABSCESS, PERIRECTAL;  Surgeon: DENISE Monterroso MD;  Location: [facility] OR 2ND FLR;  Service: Colon and Rectal;;    INSERTION OF SETON STITCH N/A 2019    Procedure: PLACEMENT, SETON STITCH;  Surgeon: DENISE Monterroso MD;  Location: [facility] OR 2ND FLR;  Service: Colon and Rectal;  Laterality: N/A;    UPPER GASTROINTESTINAL ENDOSCOPY       Family History   Problem Relation Age of Onset    ALS Paternal Grandfather     Celiac disease Neg Hx     Cirrhosis Neg Hx     Colon cancer Neg Hx     Colon polyps Neg Hx     Crohn's disease Neg Hx     Cystic fibrosis Neg Hx     Esophageal cancer Neg Hx     Hemochromatosis Neg Hx     Inflammatory bowel disease Neg Hx     Irritable bowel syndrome Neg Hx     Liver cancer Neg Hx     Liver disease Neg Hx     Rectal cancer Neg Hx     Stomach cancer Neg Hx     Ulcerative colitis Neg Hx     Roby's disease Neg Hx     Lupus Neg Hx     Multiple sclerosis Neg Hx     Tuberculosis Neg Hx     Rheum arthritis Neg Hx     Scleroderma Neg Hx     Lymphoma Neg Hx      Social History     Tobacco Use    Smoking status: Former Smoker     Types: Cigarettes     Quit date: 10/24/2015     Years since quittin.9    Smokeless tobacco: Never Used   Substance Use Topics    Alcohol use: Yes     Comment: 12//week    Drug use: No     Review of Systems   Constitutional: Negative for fatigue and fever.   HENT: Negative for rhinorrhea and sore throat.    Eyes: Negative for discharge and redness.   Respiratory: Negative for cough and shortness of breath.    Cardiovascular: Negative for chest pain and palpitations.   Gastrointestinal: Positive for rectal pain. Negative for anal bleeding, blood in stool, diarrhea, nausea and vomiting.   Endocrine: Negative for cold intolerance and heat intolerance.   Genitourinary: Negative for dysuria and frequency.    Musculoskeletal: Negative for myalgias and neck stiffness.   Skin: Negative for pallor and rash.   Neurological: Negative for dizziness and headaches.   Hematological: Does not bruise/bleed easily.   Psychiatric/Behavioral: Negative for agitation and confusion.       Physical Exam     Initial Vitals [09/17/20 1502]   BP Pulse Resp Temp SpO2   134/84 82 18 98.4 °F (36.9 °C) 97 %      MAP       --         Physical Exam    Nursing note and vitals reviewed.  Constitutional:   Well-appearing, in moderate distress, no increased respiratory effort   HENT:   Head: Normocephalic and atraumatic.   Mouth/Throat: Oropharynx is clear and moist.   Eyes: Conjunctivae are normal. No scleral icterus.   Neck: Normal range of motion. Neck supple.   Cardiovascular: Normal rate, regular rhythm and normal heart sounds.   Pulmonary/Chest: Breath sounds normal. No respiratory distress.   Abdominal: Soft. Bowel sounds are normal. He exhibits no distension. There is no abdominal tenderness.   Genitourinary:    Genitourinary Comments: Rectal exam shows small hemorrhoids, no visible mass. On palpation, there is a markedly tender, deep nodule at 1oclock     Musculoskeletal: No tenderness or edema.   Lymphadenopathy:     He has no cervical adenopathy.   Neurological: He is alert and oriented to person, place, and time.   Skin: Skin is warm and dry. Capillary refill takes less than 2 seconds. No rash noted.   Psychiatric: He has a normal mood and affect. Thought content normal.         ED Course   Procedures  Labs Reviewed   SARS-COV-2 RNA AMPLIFICATION, QUAL   ISTAT PROCEDURE   ISTAT CHEM8          Imaging Results           CT Pelvis With Contrast (Final result)  Result time 09/17/20 21:58:30    Final result by Durga Gonzalez MD (09/17/20 21:58:30)                 Impression:      Increased perianal soft tissue and partially organized fluid in the right perirectal region measuring 2.0 x 1.4 x 3.3 cm (AP by TV by CC).  Findings likely  indicate perianal fistula and small perirectal abscess.  Consider follow-up with colorectal surgery to determine if further delineation with pelvic MRI is warranted.    This report was flagged in Epic as abnormal.    Electronically signed by resident: Frida Carson  Date:    09/17/2020  Time:    21:23    Electronically signed by: Durga Gonzalez MD  Date:    09/17/2020  Time:    21:58             Narrative:    EXAMINATION:  CT PELVIS WITH  CONTRAST    CLINICAL HISTORY:  Abscess, anal or rectal;Known perianal fistula;    TECHNIQUE:  Helical CT images of the pelvis were obtained after the intravenous administration of 100 cc Omnipaque 350 intravenous contrast.  30 cc of rectal Omnipaque 350 contrast was reportedly administered.  Axial, coronal, and sagittal reconstructions were created from the source data.    COMPARISON:  MRI anal sphincter 12/29/2019    FINDINGS:  Genitourinary: Visualized course the distal ureters are unremarkable.  Bladder demonstrates smooth contours without bladder wall thickening.    Reproductive organs: Normal.    GI tract/Mesentery: Visualized loops of small and large bowel are normal in caliber without evidence for inflammation or obstruction.  The appendix is visualized and is unremarkable.  There is a rectal tube in place but no rectal contrast is visualized within the rectum or distal colon.  Correlation is needed.    Peritoneal Space: No abdominopelvic ascites or intraperitoneal free air.    Abdominal wall: Small amount of perirectal edema.  Ill-defined perianal fluid attenuation which may reflect complex perianal fistula.  Small perirectal fluid collection on the right is ill defined but measures approximately 2.0 x 1.4 x 3.3 cm (AP by TV by cc) on axial series 2, image 86 and sagittal series 602, image 126.    Vasculature: Aortic bifurcation is unremarkable.  No significant atherosclerotic disease.    Bones: Normal appearance without acute fracture or bony destructive process.                                  Medical Decision Making:   Initial Assessment:   35yo M with recurrent perianal abscess, perianal fistula s/p seton removed 2 months ago presents for severe anal pain for the past 2 days, seen in [facility] clinic yesterday and sent home with antibiotics     Differential Diagnosis:   Perianal abscess, fistula, anal fissure  Clinical Tests:   Lab Tests: Ordered and Reviewed  Radiological Study: Ordered and Reviewed  ED Management:  [place] for pain  Discussed case with colorectal surgery, who doubt need for surgical intervention, recommend pelvic CT with rectal contrast if concern remains  iSTAT obtained prior to CT, normal Cr clearance found    Patient signed out to Dr. Cabello and Dr. Donald              Attending Attestation:   Physician Attestation Statement for Resident:  As the supervising MD   Physician Attestation Statement: I have personally seen and examined this patient.   I agree with the above history. -:   As the supervising MD I agree with the above PE.    As the supervising MD I agree with the above treatment, course, plan, and disposition.                     ED Course as of Sep 18 0813   Thu Sep 17, 2020   2233 Talked to CRS after CT Pelvis was read as     [AU]      ED Course User Index  [AU] Giovanni Donald MD              Clinical Impression:     ICD-10-CM ICD-9-CM   1. Perianal abscess  K61.0 566   2. Perirectal abscess  K61.1 566                          ED Disposition Condition    Observation                           Raz Dorantes MD  Resident  09/17/20 1826       Jose Francisco Richardson MD  09/18/20 0813

## 2020-09-17 NOTE — TELEPHONE ENCOUNTER
Pt said he can barely walk due to the rectal pain. Pt asked if he could go to the ED since it is so severe. Told him he can and to be sure to let them know he was seen yesterday by Dr Monterroso because he did take a picture of the area that may be helpful.

## 2020-09-18 ENCOUNTER — ANESTHESIA (OUTPATIENT)
Dept: SURGERY | Facility: HOSPITAL | Age: 35
End: 2020-09-18
Payer: COMMERCIAL

## 2020-09-18 ENCOUNTER — ANESTHESIA EVENT (OUTPATIENT)
Dept: SURGERY | Facility: HOSPITAL | Age: 35
End: 2020-09-18
Payer: COMMERCIAL

## 2020-09-18 VITALS
WEIGHT: 195 LBS | HEART RATE: 69 BPM | HEIGHT: 73 IN | SYSTOLIC BLOOD PRESSURE: 136 MMHG | BODY MASS INDEX: 25.84 KG/M2 | DIASTOLIC BLOOD PRESSURE: 76 MMHG | RESPIRATION RATE: 16 BRPM | OXYGEN SATURATION: 97 % | TEMPERATURE: 98 F

## 2020-09-18 PROBLEM — K61.0 PERIANAL ABSCESS: Status: ACTIVE | Noted: 2020-09-18

## 2020-09-18 LAB
BASOPHILS # BLD AUTO: 0.08 K/UL (ref 0–0.2)
BASOPHILS NFR BLD: 0.7 % (ref 0–1.9)
DIFFERENTIAL METHOD: ABNORMAL
EOSINOPHIL # BLD AUTO: 0.9 K/UL (ref 0–0.5)
EOSINOPHIL NFR BLD: 7.5 % (ref 0–8)
ERYTHROCYTE [DISTWIDTH] IN BLOOD BY AUTOMATED COUNT: 12.3 % (ref 11.5–14.5)
GRAM STN SPEC: NORMAL
HCT VFR BLD AUTO: 42.5 % (ref 40–54)
HGB BLD-MCNC: 13.8 G/DL (ref 14–18)
IMM GRANULOCYTES # BLD AUTO: 0.03 K/UL (ref 0–0.04)
IMM GRANULOCYTES NFR BLD AUTO: 0.3 % (ref 0–0.5)
LYMPHOCYTES # BLD AUTO: 3.2 K/UL (ref 1–4.8)
LYMPHOCYTES NFR BLD: 27.9 % (ref 18–48)
MCH RBC QN AUTO: 29.6 PG (ref 27–31)
MCHC RBC AUTO-ENTMCNC: 32.5 G/DL (ref 32–36)
MCV RBC AUTO: 91 FL (ref 82–98)
MONOCYTES # BLD AUTO: 0.9 K/UL (ref 0.3–1)
MONOCYTES NFR BLD: 7.4 % (ref 4–15)
NEUTROPHILS # BLD AUTO: 6.5 K/UL (ref 1.8–7.7)
NEUTROPHILS NFR BLD: 56.2 % (ref 38–73)
NRBC BLD-RTO: 0 /100 WBC
PLATELET # BLD AUTO: 320 K/UL (ref 150–350)
PMV BLD AUTO: 10.7 FL (ref 9.2–12.9)
RBC # BLD AUTO: 4.67 M/UL (ref 4.6–6.2)
SARS-COV-2 RDRP RESP QL NAA+PROBE: NEGATIVE
WBC # BLD AUTO: 11.56 K/UL (ref 3.9–12.7)

## 2020-09-18 PROCEDURE — 25000003 PHARM REV CODE 250: Performed by: STUDENT IN AN ORGANIZED HEALTH CARE EDUCATION/TRAINING PROGRAM

## 2020-09-18 PROCEDURE — 71000015 HC POSTOP RECOV 1ST HR: Performed by: COLON & RECTAL SURGERY

## 2020-09-18 PROCEDURE — 36000705 HC OR TIME LEV I EA ADD 15 MIN: Performed by: COLON & RECTAL SURGERY

## 2020-09-18 PROCEDURE — 63600175 PHARM REV CODE 636 W HCPCS: Performed by: STUDENT IN AN ORGANIZED HEALTH CARE EDUCATION/TRAINING PROGRAM

## 2020-09-18 PROCEDURE — 46040 PR I&D PERIRECTAL ABSCESS: ICD-10-PCS | Mod: ,,, | Performed by: COLON & RECTAL SURGERY

## 2020-09-18 PROCEDURE — 96375 TX/PRO/DX INJ NEW DRUG ADDON: CPT | Mod: 59

## 2020-09-18 PROCEDURE — G0378 HOSPITAL OBSERVATION PER HR: HCPCS

## 2020-09-18 PROCEDURE — 25000003 PHARM REV CODE 250: Performed by: COLON & RECTAL SURGERY

## 2020-09-18 PROCEDURE — 96361 HYDRATE IV INFUSION ADD-ON: CPT

## 2020-09-18 PROCEDURE — 87077 CULTURE AEROBIC IDENTIFY: CPT

## 2020-09-18 PROCEDURE — 94761 N-INVAS EAR/PLS OXIMETRY MLT: CPT

## 2020-09-18 PROCEDURE — S0030 INJECTION, METRONIDAZOLE: HCPCS | Performed by: STUDENT IN AN ORGANIZED HEALTH CARE EDUCATION/TRAINING PROGRAM

## 2020-09-18 PROCEDURE — 63600175 PHARM REV CODE 636 W HCPCS: Performed by: NURSE ANESTHETIST, CERTIFIED REGISTERED

## 2020-09-18 PROCEDURE — 87076 CULTURE ANAEROBE IDENT EACH: CPT

## 2020-09-18 PROCEDURE — 85025 COMPLETE CBC W/AUTO DIFF WBC: CPT

## 2020-09-18 PROCEDURE — D9220A PRA ANESTHESIA: Mod: CRNA,,, | Performed by: NURSE ANESTHETIST, CERTIFIED REGISTERED

## 2020-09-18 PROCEDURE — 37000009 HC ANESTHESIA EA ADD 15 MINS: Performed by: COLON & RECTAL SURGERY

## 2020-09-18 PROCEDURE — 36000704 HC OR TIME LEV I 1ST 15 MIN: Performed by: COLON & RECTAL SURGERY

## 2020-09-18 PROCEDURE — D9220A PRA ANESTHESIA: ICD-10-PCS | Mod: ANES,,, | Performed by: ANESTHESIOLOGY

## 2020-09-18 PROCEDURE — 36415 COLL VENOUS BLD VENIPUNCTURE: CPT

## 2020-09-18 PROCEDURE — 87186 SC STD MICRODIL/AGAR DIL: CPT

## 2020-09-18 PROCEDURE — D9220A PRA ANESTHESIA: Mod: ANES,,, | Performed by: ANESTHESIOLOGY

## 2020-09-18 PROCEDURE — 87147 CULTURE TYPE IMMUNOLOGIC: CPT

## 2020-09-18 PROCEDURE — 87102 FUNGUS ISOLATION CULTURE: CPT

## 2020-09-18 PROCEDURE — D9220A PRA ANESTHESIA: ICD-10-PCS | Mod: CRNA,,, | Performed by: NURSE ANESTHETIST, CERTIFIED REGISTERED

## 2020-09-18 PROCEDURE — U0002 COVID-19 LAB TEST NON-CDC: HCPCS

## 2020-09-18 PROCEDURE — 87070 CULTURE OTHR SPECIMN AEROBIC: CPT

## 2020-09-18 PROCEDURE — 71000033 HC RECOVERY, INTIAL HOUR: Performed by: COLON & RECTAL SURGERY

## 2020-09-18 PROCEDURE — 46040 I&D ISCHIORCT&/PERIRCT ABSC: CPT | Mod: ,,, | Performed by: COLON & RECTAL SURGERY

## 2020-09-18 PROCEDURE — 25000003 PHARM REV CODE 250: Performed by: NURSE ANESTHETIST, CERTIFIED REGISTERED

## 2020-09-18 PROCEDURE — 96376 TX/PRO/DX INJ SAME DRUG ADON: CPT

## 2020-09-18 PROCEDURE — 87205 SMEAR GRAM STAIN: CPT

## 2020-09-18 PROCEDURE — 87075 CULTR BACTERIA EXCEPT BLOOD: CPT

## 2020-09-18 PROCEDURE — 37000008 HC ANESTHESIA 1ST 15 MINUTES: Performed by: COLON & RECTAL SURGERY

## 2020-09-18 RX ORDER — HYDROMORPHONE HYDROCHLORIDE 1 MG/ML
0.5 INJECTION, SOLUTION INTRAMUSCULAR; INTRAVENOUS; SUBCUTANEOUS ONCE
Status: DISCONTINUED | OUTPATIENT
Start: 2020-09-18 | End: 2020-09-18 | Stop reason: HOSPADM

## 2020-09-18 RX ORDER — LIDOCAINE HYDROCHLORIDE 10 MG/ML
1 INJECTION, SOLUTION EPIDURAL; INFILTRATION; INTRACAUDAL; PERINEURAL ONCE
Status: DISCONTINUED | OUTPATIENT
Start: 2020-09-18 | End: 2020-09-18 | Stop reason: HOSPADM

## 2020-09-18 RX ORDER — SODIUM CHLORIDE 0.9 % (FLUSH) 0.9 %
10 SYRINGE (ML) INJECTION
Status: DISCONTINUED | OUTPATIENT
Start: 2020-09-18 | End: 2020-09-18

## 2020-09-18 RX ORDER — LIDOCAINE HYDROCHLORIDE 10 MG/ML
INJECTION, SOLUTION EPIDURAL; INFILTRATION; INTRACAUDAL; PERINEURAL
Status: DISCONTINUED | OUTPATIENT
Start: 2020-09-18 | End: 2020-09-18 | Stop reason: HOSPADM

## 2020-09-18 RX ORDER — NALOXONE HCL 0.4 MG/ML
0.02 VIAL (ML) INJECTION
Status: DISCONTINUED | OUTPATIENT
Start: 2020-09-18 | End: 2020-09-18 | Stop reason: HOSPADM

## 2020-09-18 RX ORDER — LIDOCAINE HCL/PF 100 MG/5ML
SYRINGE (ML) INTRAVENOUS
Status: DISCONTINUED | OUTPATIENT
Start: 2020-09-18 | End: 2020-09-18

## 2020-09-18 RX ORDER — OXYCODONE HYDROCHLORIDE 5 MG/1
5 TABLET ORAL EVERY 4 HOURS PRN
Qty: 30 TABLET | Refills: 0 | Status: SHIPPED | OUTPATIENT
Start: 2020-09-18 | End: 2020-10-12

## 2020-09-18 RX ORDER — ROCURONIUM BROMIDE 10 MG/ML
INJECTION, SOLUTION INTRAVENOUS
Status: DISCONTINUED | OUTPATIENT
Start: 2020-09-18 | End: 2020-09-18

## 2020-09-18 RX ORDER — BUPIVACAINE HYDROCHLORIDE AND EPINEPHRINE 2.5; 5 MG/ML; UG/ML
INJECTION, SOLUTION EPIDURAL; INFILTRATION; INTRACAUDAL; PERINEURAL
Status: DISCONTINUED | OUTPATIENT
Start: 2020-09-18 | End: 2020-09-18 | Stop reason: HOSPADM

## 2020-09-18 RX ORDER — SODIUM CHLORIDE 0.9 % (FLUSH) 0.9 %
10 SYRINGE (ML) INJECTION
Status: DISCONTINUED | OUTPATIENT
Start: 2020-09-18 | End: 2020-09-18 | Stop reason: HOSPADM

## 2020-09-18 RX ORDER — SODIUM CHLORIDE 9 MG/ML
INJECTION, SOLUTION INTRAVENOUS CONTINUOUS
Status: DISCONTINUED | OUTPATIENT
Start: 2020-09-18 | End: 2020-09-18 | Stop reason: HOSPADM

## 2020-09-18 RX ORDER — ONDANSETRON 2 MG/ML
4 INJECTION INTRAMUSCULAR; INTRAVENOUS DAILY PRN
Status: DISCONTINUED | OUTPATIENT
Start: 2020-09-18 | End: 2020-09-18 | Stop reason: HOSPADM

## 2020-09-18 RX ORDER — OXYCODONE HYDROCHLORIDE 10 MG/1
10 TABLET ORAL EVERY 4 HOURS PRN
Status: DISCONTINUED | OUTPATIENT
Start: 2020-09-18 | End: 2020-09-18 | Stop reason: HOSPADM

## 2020-09-18 RX ORDER — HYDROMORPHONE HYDROCHLORIDE 1 MG/ML
0.5 INJECTION, SOLUTION INTRAMUSCULAR; INTRAVENOUS; SUBCUTANEOUS ONCE
Status: COMPLETED | OUTPATIENT
Start: 2020-09-18 | End: 2020-09-18

## 2020-09-18 RX ORDER — CIPROFLOXACIN 2 MG/ML
400 INJECTION, SOLUTION INTRAVENOUS
Status: DISCONTINUED | OUTPATIENT
Start: 2020-09-18 | End: 2020-09-18 | Stop reason: HOSPADM

## 2020-09-18 RX ORDER — ACETAMINOPHEN 10 MG/ML
INJECTION, SOLUTION INTRAVENOUS
Status: DISCONTINUED | OUTPATIENT
Start: 2020-09-18 | End: 2020-09-18

## 2020-09-18 RX ORDER — NEOSTIGMINE METHYLSULFATE 0.5 MG/ML
INJECTION, SOLUTION INTRAVENOUS
Status: DISCONTINUED | OUTPATIENT
Start: 2020-09-18 | End: 2020-09-18

## 2020-09-18 RX ORDER — SUCCINYLCHOLINE CHLORIDE 20 MG/ML
INJECTION INTRAMUSCULAR; INTRAVENOUS
Status: DISCONTINUED | OUTPATIENT
Start: 2020-09-18 | End: 2020-09-18

## 2020-09-18 RX ORDER — TALC
6 POWDER (GRAM) TOPICAL NIGHTLY PRN
Status: DISCONTINUED | OUTPATIENT
Start: 2020-09-18 | End: 2020-09-18 | Stop reason: HOSPADM

## 2020-09-18 RX ORDER — ONDANSETRON 4 MG/1
4 TABLET, ORALLY DISINTEGRATING ORAL EVERY 6 HOURS PRN
Qty: 20 TABLET | Refills: 0 | Status: SHIPPED | OUTPATIENT
Start: 2020-09-18 | End: 2020-10-12

## 2020-09-18 RX ORDER — PROPOFOL 10 MG/ML
VIAL (ML) INTRAVENOUS
Status: DISCONTINUED | OUTPATIENT
Start: 2020-09-18 | End: 2020-09-18

## 2020-09-18 RX ORDER — ONDANSETRON 2 MG/ML
4 INJECTION INTRAMUSCULAR; INTRAVENOUS EVERY 6 HOURS PRN
Status: DISCONTINUED | OUTPATIENT
Start: 2020-09-18 | End: 2020-09-18 | Stop reason: HOSPADM

## 2020-09-18 RX ORDER — ONDANSETRON 8 MG/1
8 TABLET, ORALLY DISINTEGRATING ORAL EVERY 8 HOURS PRN
Status: DISCONTINUED | OUTPATIENT
Start: 2020-09-18 | End: 2020-09-18 | Stop reason: HOSPADM

## 2020-09-18 RX ORDER — FENTANYL CITRATE 50 UG/ML
INJECTION, SOLUTION INTRAMUSCULAR; INTRAVENOUS
Status: DISCONTINUED | OUTPATIENT
Start: 2020-09-18 | End: 2020-09-18

## 2020-09-18 RX ORDER — ACETAMINOPHEN 325 MG/1
650 TABLET ORAL EVERY 8 HOURS PRN
Status: DISCONTINUED | OUTPATIENT
Start: 2020-09-18 | End: 2020-09-18 | Stop reason: HOSPADM

## 2020-09-18 RX ORDER — FENTANYL CITRATE 50 UG/ML
25 INJECTION, SOLUTION INTRAMUSCULAR; INTRAVENOUS EVERY 5 MIN PRN
Status: DISCONTINUED | OUTPATIENT
Start: 2020-09-18 | End: 2020-09-18 | Stop reason: HOSPADM

## 2020-09-18 RX ORDER — ONDANSETRON 2 MG/ML
4 INJECTION INTRAMUSCULAR; INTRAVENOUS EVERY 6 HOURS PRN
Status: DISCONTINUED | OUTPATIENT
Start: 2020-09-18 | End: 2020-09-18

## 2020-09-18 RX ORDER — METRONIDAZOLE 500 MG/100ML
500 INJECTION, SOLUTION INTRAVENOUS
Status: DISCONTINUED | OUTPATIENT
Start: 2020-09-18 | End: 2020-09-18 | Stop reason: HOSPADM

## 2020-09-18 RX ORDER — MIDAZOLAM HYDROCHLORIDE 1 MG/ML
INJECTION, SOLUTION INTRAMUSCULAR; INTRAVENOUS
Status: DISCONTINUED | OUTPATIENT
Start: 2020-09-18 | End: 2020-09-18

## 2020-09-18 RX ORDER — DEXAMETHASONE SODIUM PHOSPHATE 4 MG/ML
INJECTION, SOLUTION INTRA-ARTICULAR; INTRALESIONAL; INTRAMUSCULAR; INTRAVENOUS; SOFT TISSUE
Status: DISCONTINUED | OUTPATIENT
Start: 2020-09-18 | End: 2020-09-18

## 2020-09-18 RX ORDER — OXYCODONE HYDROCHLORIDE 5 MG/1
5 TABLET ORAL EVERY 4 HOURS PRN
Status: DISCONTINUED | OUTPATIENT
Start: 2020-09-18 | End: 2020-09-18 | Stop reason: HOSPADM

## 2020-09-18 RX ADMIN — SUCCINYLCHOLINE CHLORIDE 160 MG: 20 INJECTION, SOLUTION INTRAMUSCULAR; INTRAVENOUS at 11:09

## 2020-09-18 RX ADMIN — NEOSTIGMINE METHYLSULFATE 5 MG: 0.5 INJECTION INTRAVENOUS at 12:09

## 2020-09-18 RX ADMIN — LIDOCAINE HYDROCHLORIDE 100 MG: 20 INJECTION, SOLUTION INTRAVENOUS at 11:09

## 2020-09-18 RX ADMIN — OXYCODONE HYDROCHLORIDE 10 MG: 10 TABLET ORAL at 01:09

## 2020-09-18 RX ADMIN — DEXAMETHASONE SODIUM PHOSPHATE 4 MG: 4 INJECTION, SOLUTION INTRAMUSCULAR; INTRAVENOUS at 12:09

## 2020-09-18 RX ADMIN — MIDAZOLAM HYDROCHLORIDE 2 MG: 1 INJECTION, SOLUTION INTRAMUSCULAR; INTRAVENOUS at 11:09

## 2020-09-18 RX ADMIN — PROPOFOL 200 MG: 10 INJECTION, EMULSION INTRAVENOUS at 11:09

## 2020-09-18 RX ADMIN — METRONIDAZOLE 500 MG: 500 INJECTION, SOLUTION INTRAVENOUS at 01:09

## 2020-09-18 RX ADMIN — ROCURONIUM BROMIDE 5 MG: 10 INJECTION, SOLUTION INTRAVENOUS at 11:09

## 2020-09-18 RX ADMIN — ROCURONIUM BROMIDE 45 MG: 10 INJECTION, SOLUTION INTRAVENOUS at 11:09

## 2020-09-18 RX ADMIN — CIPROFLOXACIN 400 MG: 2 INJECTION, SOLUTION INTRAVENOUS at 02:09

## 2020-09-18 RX ADMIN — OXYCODONE HYDROCHLORIDE 10 MG: 10 TABLET ORAL at 07:09

## 2020-09-18 RX ADMIN — CIPROFLOXACIN 400 MG: 2 INJECTION, SOLUTION INTRAVENOUS at 03:09

## 2020-09-18 RX ADMIN — SODIUM CHLORIDE: 0.9 INJECTION, SOLUTION INTRAVENOUS at 01:09

## 2020-09-18 RX ADMIN — ACETAMINOPHEN 1000 MG: 10 INJECTION, SOLUTION INTRAVENOUS at 12:09

## 2020-09-18 RX ADMIN — FENTANYL CITRATE 100 MCG: 50 INJECTION, SOLUTION INTRAMUSCULAR; INTRAVENOUS at 11:09

## 2020-09-18 RX ADMIN — GLYCOPYRROLATE 0.4 MG: 0.2 INJECTION INTRAMUSCULAR; INTRAVENOUS at 12:09

## 2020-09-18 RX ADMIN — HYDROMORPHONE HYDROCHLORIDE 0.5 MG: 1 INJECTION, SOLUTION INTRAMUSCULAR; INTRAVENOUS; SUBCUTANEOUS at 05:09

## 2020-09-18 RX ADMIN — SODIUM CHLORIDE, SODIUM GLUCONATE, SODIUM ACETATE, POTASSIUM CHLORIDE, MAGNESIUM CHLORIDE, SODIUM PHOSPHATE, DIBASIC, AND POTASSIUM PHOSPHATE: .53; .5; .37; .037; .03; .012; .00082 INJECTION, SOLUTION INTRAVENOUS at 12:09

## 2020-09-18 RX ADMIN — METRONIDAZOLE 500 MG: 500 INJECTION, SOLUTION INTRAVENOUS at 11:09

## 2020-09-18 RX ADMIN — ONDANSETRON 4 MG: 2 INJECTION, SOLUTION INTRAMUSCULAR; INTRAVENOUS at 11:09

## 2020-09-18 RX ADMIN — METRONIDAZOLE 500 MG: 500 INJECTION, SOLUTION INTRAVENOUS at 09:09

## 2020-09-18 RX ADMIN — ONDANSETRON 4 MG: 2 INJECTION, SOLUTION INTRAMUSCULAR; INTRAVENOUS at 12:09

## 2020-09-18 RX ADMIN — CIPROFLOXACIN 400 MG: 2 INJECTION, SOLUTION INTRAVENOUS at 11:09

## 2020-09-18 NOTE — SUBJECTIVE & OBJECTIVE
Subjective:     Interval History: NAEON. Patient resting comfortably in bed on exam. NAD. Pain under control. No respiratory distress. Plan for examination under anesthesia today.    Post-Op Info:  Procedure(s) (LRB):  Exam under anesthesia possible seton (N/A)          Medications:  Continuous Infusions:   sodium chloride 0.9% 75 mL/hr at 09/18/20 0157     Scheduled Meds:   ciprofloxacin (CIPRO)400mg/200ml D5W IVPB  400 mg Intravenous Q12H    metronidazole  500 mg Intravenous Q8H     PRN Meds:   naloxone    ondansetron    oxyCODONE    oxyCODONE    sodium chloride 0.9%        Objective:     Vital Signs (Most Recent):  Temp: 97.8 °F (36.6 °C) (09/18/20 0806)  Pulse: 65 (09/18/20 0806)  Resp: 18 (09/18/20 0806)  BP: 118/75 (09/18/20 0806)  SpO2: 99 % (09/18/20 0806) Vital Signs (24h Range):  Temp:  [97.3 °F (36.3 °C)-98.4 °F (36.9 °C)] 97.8 °F (36.6 °C)  Pulse:  [60-82] 65  Resp:  [12-18] 18  SpO2:  [97 %-100 %] 99 %  BP: (118-146)/(61-89) 118/75     Intake/Output - Last 3 Shifts     None          Physical Exam  Vitals signs reviewed.   Constitutional:       General: He is not in acute distress.     Appearance: Normal appearance.   HENT:      Head: Normocephalic and atraumatic.      Nose: Nose normal.   Eyes:      Extraocular Movements: Extraocular movements intact.   Neck:      Musculoskeletal: Normal range of motion.   Cardiovascular:      Rate and Rhythm: Normal rate.      Pulses: Normal pulses.   Pulmonary:      Effort: Pulmonary effort is normal. No respiratory distress.   Abdominal:      General: There is no distension.      Palpations: Abdomen is soft.   Musculoskeletal: Normal range of motion.   Skin:     General: Skin is warm and dry.      Findings: No erythema.   Neurological:      General: No focal deficit present.      Mental Status: He is alert and oriented to person, place, and time.   Psychiatric:         Behavior: Behavior normal.           Significant Labs:  CBC (Last 3 Results):   Recent  Labs   Lab 09/16/20  1535 09/17/20  1632 09/18/20  0259   WBC 11.27  --  11.56   RBC 4.89  --  4.67   HGB 14.4  --  13.8*   HCT 44.5 44 42.5     --  320   MCV 91  --  91   MCH 29.4  --  29.6   MCHC 32.4  --  32.5     CBC:   Recent Labs   Lab 09/18/20  0259   WBC 11.56   RBC 4.67   HGB 13.8*   HCT 42.5      MCV 91   MCH 29.6   MCHC 32.5     CMP (Last 3 Results): No results for input(s): GLU, CALCIUM, ALBUMIN, PROT, NA, K, CO2, CL, BUN, CREATININE, ALKPHOS, ALT, AST, BILITOT in the last 168 hours.  CMP: No results for input(s): GLU, CALCIUM, ALBUMIN, PROT, NA, K, CO2, CL, BUN, CREATININE, ALKPHOS, ALT, AST, BILITOT in the last 168 hours.  CRP (Last 3 Results):   Recent Labs   Lab 09/16/20  1535   CRP 0.3     CRP:   Recent Labs   Lab 09/16/20  1535   CRP 0.3     All pertinent lab results within the last 24 hours have been reviewed.     Significant Diagnostics:  CT: Increased perianal soft tissue and partially organized fluid in the right perirectal region measuring 2.0 x 1.4 x 3.3 cm (AP by TV by CC).  Findings likely indicate perianal fistula and small perirectal abscess.  I have reviewed all pertinent imaging results/findings within the past 24 hours.

## 2020-09-18 NOTE — TRANSFER OF CARE
"Anesthesia Transfer of Care Note    Patient: Joss Alvarez Juju    Procedure(s) Performed: Procedure(s) (LRB):  Exam under anesthesia possible seton (N/A)  PLACEMENT, SETON STITCH  INCISION AND DRAINAGE, ABSCESS    Patient location: PACU    Anesthesia Type: general    Transport from OR: Transported from OR on 6-10 L/min O2 by face mask with adequate spontaneous ventilation    Post pain: adequate analgesia    Post assessment: no apparent anesthetic complications and tolerated procedure well    Post vital signs: stable    Level of consciousness: responds to stimulation    Nausea/Vomiting: nausea and no vomiting    Complications: none    Transfer of care protocol was followed      Last vitals:   Visit Vitals  BP (!) 153/78 (BP Location: Right arm, Patient Position: Lying)   Pulse 89   Temp 36.2 °C (97.2 °F) (Temporal)   Resp 20   Ht 6' 1" (1.854 m)   Wt 88.5 kg (195 lb)   SpO2 100%   BMI 25.73 kg/m²     "

## 2020-09-18 NOTE — H&P
See consult note 9/18/20.    Elizabeth Diaz MD  Surgery Resident, PGY-5  Pager [phone]  09/18/2020

## 2020-09-18 NOTE — PLAN OF CARE
CM to bedside to discuss d/c planning assessment. Patient is Independent with ADL's and lives with spouse. Wife to provide transportation home at discharge. Verified Pharmacy and Chron's MD Dr. Spence. Patient scheduled for exam under anesthesia at 1125 AM. Patient provided with d/c planning booklet and questions answered. Will continue to follow for needs.   09/18/20 1051   Discharge Assessment   Assessment Type Discharge Planning Assessment   Confirmed/corrected address and phone number on facesheet? Yes   Assessment information obtained from? Patient   Expected Length of Stay (days) 5   Communicated expected length of stay with patient/caregiver yes   Prior to hospitilization cognitive status: Alert/Oriented   Prior to hospitalization functional status: Independent   Current cognitive status: Alert/Oriented   Current Functional Status: Independent   Facility Arrived From: Home   Lives With spouse   Able to Return to Prior Arrangements yes   Is patient able to care for self after discharge? Yes   Who are your caregiver(s) and their phone number(s)? Cecilia Matute (Spouse) [phone]   Patient's perception of discharge disposition home or selfcare   Readmission Within the Last 30 Days no previous admission in last 30 days   Patient currently being followed by outpatient case management? No   Patient currently receives any other outside agency services? No   Equipment Currently Used at Home none   Do you have any problems affording any of your prescribed medications? No   Is the patient taking medications as prescribed? yes   Does the patient have transportation home? Yes   Transportation Anticipated family or friend will provide   Does the patient receive services at the [facility]? No   Discharge Plan A Home with family   Discharge Plan B Home with family   DME Needed Upon Discharge  none   Patient/Family in Agreement with Plan yes

## 2020-09-18 NOTE — ANESTHESIA PREPROCEDURE EVALUATION
[facility]  Anesthesia Pre-Operative Evaluation         Patient Name: Joss Matute  YOB: 1985  MRN: [id number]    SUBJECTIVE:     Pre-operative evaluation for Procedure(s) (LRB):  Exam under anesthesia possible seton (N/A)     09/18/2020    Joss Matute is a 34 y.o. male w/ a significant PMHx of Crohn's disease, perirectal abscess, and anal fistulas, s/p seton placement 12/19, who presents with perirectal abscess.    Patient now presents for the above procedure(s).      LDA: None documented.    Prev airway: None documented.    Drips: None documented.    Patient Active Problem List   Diagnosis    Vitamin D deficiency    Former smoker    Penile venereal warts    Crohn's disease of large intestine with fistula    History of recurrent perirectal abscess with perianal fistula    Immunosuppressed status    Perianal abscess       Review of patient's allergies indicates:  No Known Allergies    Current Outpatient Medications:    Current Facility-Administered Medications:     0.9%  NaCl infusion, , Intravenous, Continuous, Elizabeth Diaz MD, Last Rate: 75 mL/hr at 09/18/20 0157    ciprofloxacin (CIPRO)400mg/200ml D5W IVPB 400 mg, 400 mg, Intravenous, Q12H, Elizabeth Diaz MD, Last Rate: 200 mL/hr at 09/18/20 0304, 400 mg at 09/18/20 0304    metronidazole IVPB 500 mg, 500 mg, Intravenous, Q8H, Elizabeth Diaz MD, Last Rate: 100 mL/hr at 09/18/20 0156, 500 mg at 09/18/20 0156    naloxone 0.4 mg/mL injection 0.02 mg, 0.02 mg, Intravenous, PRN, Elizabeth Diaz MD    ondansetron injection 4 mg, 4 mg, Intravenous, Q6H PRN, Elizabeth Diaz MD    oxyCODONE immediate release tablet 10 mg, 10 mg, Oral, Q4H PRN, Elziabeth Diaz MD, 10 mg at 09/18/20 0156    oxyCODONE immediate release tablet 5 mg, 5 mg, Oral, Q4H PRN, Elizabeth Diaz MD    sodium chloride 0.9% flush  10 mL, 10 mL, Intravenous, PRN, Elizabeth Diaz MD    Past Surgical History:   Procedure Laterality Date    ABSCESS DRAINAGE      ANAL FISTULOTOMY      COLONOSCOPY      COLONOSCOPY N/A 4/7/2017    Procedure: COLONOSCOPY;  Surgeon: Bubba Spence MD;  Location: [facility] ENDO (4TH FLR);  Service: Endoscopy;  Laterality: N/A;    COLONOSCOPY N/A 10/10/2018    Procedure: COLONOSCOPY;  Surgeon: Bubba Spence MD;  Location: [facility] ENDO (4TH FLR);  Service: Endoscopy;  Laterality: N/A;  schedule as 45 minute case--due Fall 2018    COLONOSCOPY N/A 3/10/2020    Procedure: COLONOSCOPY;  Surgeon: Bubba Spence MD;  Location: [facility] ENDO (4TH FLR);  Service: Endoscopy;  Laterality: N/A;  schedule 40 minute case  March 2020        EXAMINATION UNDER ANESTHESIA N/A 12/30/2019    Procedure: Exam under anesthesia- possible seton;  Surgeon: DENISE Monterroso MD;  Location: [facility] OR 2ND FLR;  Service: Colon and Rectal;  Laterality: N/A;    INCISION AND DRAINAGE OF ABSCESS  12/30/2019    Procedure: INCISION AND DRAINAGE, ABSCESS;  Surgeon: DENISE Monterroso MD;  Location: [facility] OR 2ND FLR;  Service: Colon and Rectal;;    INCISION OF PERIRECTAL ABSCESS  12/30/2019    Procedure: INCISION, ABSCESS, PERIRECTAL;  Surgeon: DENISE Monterroso MD;  Location: [facility] OR 2ND FLR;  Service: Colon and Rectal;;    INSERTION OF SETON STITCH N/A 12/30/2019    Procedure: PLACEMENT, SETON STITCH;  Surgeon: DENISE Monterroso MD;  [facility]: [facility] 2ND FLR;  Service: Colon and Rectal;  Laterality: N/A;    UPPER GASTROINTESTINAL ENDOSCOPY         Social History     Socioeconomic History    Marital status:      Spouse name: Not on file    Number of children: Not on file    Years of education: Not on file    Highest education level: Not on file   Occupational History    Not on file   Social Needs    Financial resource strain: Not on file    Food insecurity     Worry: Not on file     Inability: Not on file    Transportation needs      Medical: Not on file     Non-medical: Not on file   Tobacco Use    Smoking status: Former Smoker     Types: Cigarettes     Quit date: 10/24/2015     Years since quittin.9    Smokeless tobacco: Never Used   Substance and Sexual Activity    Alcohol use: Yes     Comment: 12//week    Drug use: No    Sexual activity: Yes     Partners: Female     Comment:     Lifestyle    Physical activity     Days per week: Not on file     Minutes per session: Not on file    Stress: Not on file   Relationships    Social connections     Talks on phone: Not on file     Gets together: Not on file     Attends [employer]ism service: Not on file     Active member of club or organization: Not on file     Attends meetings of clubs or organizations: Not on file     Relationship status: Not on file   Other Topics Concern    Not on file   Social History Narrative     at [employer]        OBJECTIVE:     Vital Signs Range (Last 24H):  Temp:  [36.3 °C (97.3 °F)-36.9 °C (98.4 °F)]   Pulse:  [60-82]   Resp:  [12-18]   BP: (120-146)/(61-89)   SpO2:  [97 %-100 %]       Significant Labs:  Lab Results   Component Value Date    WBC 11.27 2020    HGB 14.4 2020    HCT 44 2020     2020    ALT 32 2020    AST 20 2020     2020    K 4.3 2020     2020    CREATININE 0.8 2020    BUN 12 2020    CO2 29 2020       Diagnostic Studies: No relevant studies.    EKG:   No results found for this or any previous visit.    2D ECHO:  TTE:  No results found for this or any previous visit.    GLADYS:  No results found for this or any previous visit.    ASSESSMENT/PLAN:         Anesthesia Evaluation    I have reviewed the Patient Summary Reports.    I have reviewed the Nursing Notes. I have reviewed the NPO Status.   I have reviewed the Medications.     Review of Systems  Anesthesia Hx:  No problems with previous Anesthesia  History of prior surgery of interest to  airway management or planning: Denies Family Hx of Anesthesia complications.   Denies Personal Hx of Anesthesia complications.   Hematology/Oncology:  Hematology Normal   Oncology Normal     EENT/Dental:EENT/Dental Normal   Cardiovascular:  Cardiovascular Normal     Pulmonary:  Pulmonary Normal    Renal/:  Renal/ Normal     Hepatic/GI:     Crohn's disease   Musculoskeletal:  Musculoskeletal Normal    Neurological:  Neurology Normal    Endocrine:  Endocrine Normal    Dermatological:  Skin Normal    Psych:  Psychiatric Normal           Physical Exam  General:  Well nourished    Airway/Jaw/Neck:  Airway Findings: Mouth Opening: Normal Tongue: Normal  General Airway Assessment: Adult  Mallampati: II  TM Distance: 4 - 6 cm  Jaw/Neck Findings:  Neck ROM: Normal ROM     Eyes/Ears/Nose:  EYES/EARS/NOSE FINDINGS: Normal   Dental:  Dental Findings: In tact   Chest/Lungs:  Chest/Lungs Findings: Normal Respiratory Rate     Heart/Vascular:  Heart Findings: Rate: Normal  Rhythm: Regular Rhythm  Heart murmur: negative Vascular Findings: Normal    Abdomen:  Abdomen Findings: Normal    Musculoskeletal:  Musculoskeletal Findings: Normal   Skin:  Skin Findings: Normal    Mental Status:  Mental Status Findings:  Cooperative, Alert and Oriented         Anesthesia Plan  Type of Anesthesia, risks & benefits discussed:  Anesthesia Type:  general  Patient's Preference:   Intra-op Monitoring Plan: standard ASA monitors  Intra-op Monitoring Plan Comments:   Post Op Pain Control Plan: per primary service following discharge from PACU  Post Op Pain Control Plan Comments:   Induction:   IV  Beta Blocker:  Patient is not currently on a Beta-Blocker (No further documentation required).       Informed Consent: Patient understands risks and agrees with Anesthesia plan.  Questions answered. Anesthesia consent signed with patient.  ASA Score: 2     Day of Surgery Review of History & Physical: I have interviewed and examined the patient. I have  reviewed the patient's H&P dated:  There are no significant changes.  H&P update referred to the surgeon.         Ready For Surgery From Anesthesia Perspective.

## 2020-09-18 NOTE — ANESTHESIA PROCEDURE NOTES
Intubation  Performed by: Gina Ruvalcaba CRNA  Authorized by: Gina Ruvalcaba CRNA     Intubation:     Induction:  Intravenous    Intubated:  Postinduction    Mask Ventilation:  N/a (rsi w/no ventilation)    Attempts:  1    Attempted By:  Student    Method of Intubation:  Direct    Blade:  Baer 2    Laryngeal View Grade: Grade I - full view of chords      Difficult Airway Encountered?: No      Complications:  None    Airway Device:  Oral endotracheal tube    Airway Device Size:  7.5    Style/Cuff Inflation:  Cuffed    Inflation Amount (mL):  6    Tube secured:  22    Secured at:  The lips    Placement Verified By:  Capnometry    Complicating Factors:  None    Findings Post-Intubation:  BS equal bilateral

## 2020-09-18 NOTE — ANESTHESIA POSTPROCEDURE EVALUATION
Anesthesia Post Evaluation    Patient: Joss Alvarez Juju    Procedure(s) Performed: Procedure(s) (LRB):  Exam under anesthesia possible seton (N/A)  PLACEMENT, SETON STITCH  INCISION AND DRAINAGE, ABSCESS    Final Anesthesia Type: general    Patient location during evaluation: PACU  Patient participation: Yes- Able to Participate  Level of consciousness: awake and alert and oriented  Post-procedure vital signs: reviewed and stable  Pain management: adequate  Airway patency: patent    PONV status at discharge: No PONV  Anesthetic complications: no      Cardiovascular status: stable  Respiratory status: unassisted, spontaneous ventilation and face mask  Hydration status: euvolemic  Follow-up not needed.          Vitals Value Taken Time   /77 09/18/20 1302   Temp 36.2 °C (97.2 °F) 09/18/20 1248   Pulse 66 09/18/20 1310   Resp 13 09/18/20 1310   SpO2 100 % 09/18/20 1310   Vitals shown include unvalidated device data.      No case tracking events are documented in the log.      Pain/Ferny Score: Pain Rating Prior to Med Admin: 7 (9/18/2020  7:46 AM)  Ferny Score: 8 (9/18/2020 12:48 PM)

## 2020-09-18 NOTE — PLAN OF CARE
Plan of care reviewed with pt. Pt AAOx4, VSSAF. Patient on RA. Purposeful rounding for pt care and safety. No reports of NV, pain managed with PRN meds. No falls or injuries reported during this shift. Skin integrity as charted. Safety precautions maintained during shift- bed in low position, call light in reach, SR up x2, personal belongings in reach.

## 2020-09-18 NOTE — PROGRESS NOTES
[facility]  Colorectal Surgery  Progress Note    Patient Name: Joss Matute  MRN: [id number]  Admission Date: 9/17/2020  Hospital Length of Stay: 0 days  Attending Physician: DENISE Monterroso MD    Subjective:     Interval History: NAEON. Patient resting comfortably in bed on exam. NAD. Pain under control. No respiratory distress. Plan for examination under anesthesia today.    Post-Op Info:  Procedure(s) (LRB):  Exam under anesthesia possible seton (N/A)          Medications:  Continuous Infusions:   sodium chloride 0.9% 75 mL/hr at 09/18/20 0157     Scheduled Meds:   ciprofloxacin (CIPRO)400mg/200ml D5W IVPB  400 mg Intravenous Q12H    metronidazole  500 mg Intravenous Q8H     PRN Meds:   naloxone    ondansetron    oxyCODONE    oxyCODONE    sodium chloride 0.9%        Objective:     Vital Signs (Most Recent):  Temp: 97.8 °F (36.6 °C) (09/18/20 0806)  Pulse: 65 (09/18/20 0806)  Resp: 18 (09/18/20 0806)  BP: 118/75 (09/18/20 0806)  SpO2: 99 % (09/18/20 0806) Vital Signs (24h Range):  Temp:  [97.3 °F (36.3 °C)-98.4 °F (36.9 °C)] 97.8 °F (36.6 °C)  Pulse:  [60-82] 65  Resp:  [12-18] 18  SpO2:  [97 %-100 %] 99 %  BP: (118-146)/(61-89) 118/75     Intake/Output - Last 3 Shifts     None          Physical Exam  Vitals signs reviewed.   Constitutional:       General: He is not in acute distress.     Appearance: Normal appearance.   HENT:      Head: Normocephalic and atraumatic.      Nose: Nose normal.   Eyes:      Extraocular Movements: Extraocular movements intact.   Neck:      Musculoskeletal: Normal range of motion.   Cardiovascular:      Rate and Rhythm: Normal rate.      Pulses: Normal pulses.   Pulmonary:      Effort: Pulmonary effort is normal. No respiratory distress.   Abdominal:      General: There is no distension.      Palpations: Abdomen is soft.   Musculoskeletal: Normal range of motion.   Skin:     General: Skin is warm and dry.      Findings: No erythema.   Neurological:       General: No focal deficit present.      Mental Status: He is alert and oriented to person, place, and time.   Psychiatric:         Behavior: Behavior normal.           Significant Labs:  CBC (Last 3 Results):   Recent Labs   Lab 09/16/20  1535 09/17/20  1632 09/18/20  0259   WBC 11.27  --  11.56   RBC 4.89  --  4.67   HGB 14.4  --  13.8*   HCT 44.5 44 42.5     --  320   MCV 91  --  91   MCH 29.4  --  29.6   MCHC 32.4  --  32.5     CBC:   Recent Labs   Lab 09/18/20  0259   WBC 11.56   RBC 4.67   HGB 13.8*   HCT 42.5      MCV 91   MCH 29.6   MCHC 32.5     CMP (Last 3 Results): No results for input(s): GLU, CALCIUM, ALBUMIN, PROT, NA, K, CO2, CL, BUN, CREATININE, ALKPHOS, ALT, AST, BILITOT in the last 168 hours.  CMP: No results for input(s): GLU, CALCIUM, ALBUMIN, PROT, NA, K, CO2, CL, BUN, CREATININE, ALKPHOS, ALT, AST, BILITOT in the last 168 hours.  CRP (Last 3 Results):   Recent Labs   Lab 09/16/20  1535   CRP 0.3     CRP:   Recent Labs   Lab 09/16/20  1535   CRP 0.3     All pertinent lab results within the last 24 hours have been reviewed.     Significant Diagnostics:  CT: Increased perianal soft tissue and partially organized fluid in the right perirectal region measuring 2.0 x 1.4 x 3.3 cm (AP by TV by CC).  Findings likely indicate perianal fistula and small perirectal abscess.  I have reviewed all pertinent imaging results/findings within the past 24 hours.    Assessment/Plan:     * History of recurrent perirectal abscess with perianal fistula  35yo M with crohn's colitis and h/o perirectal abscess with fistulas, presents with increased rectal pain x48 hours    - Admit to colorectal surgery  - NPO  - IVF  - Abx: cipro, flagyl  - PRN pain meds  - Plan for EUA today          Aracelis Ross MD  Colorectal Surgery  [facility]

## 2020-09-18 NOTE — ANESTHESIA RELEASE NOTE
"Anesthesia Release from PACU Note    Patient: Joss Alvarez Juju    Procedure(s) Performed: Procedure(s) (LRB):  Exam under anesthesia possible seton (N/A)  PLACEMENT, SETON STITCH  INCISION AND DRAINAGE, ABSCESS    Anesthesia type: GEN    Post pain: Adequate analgesia reported    Post assessment: no apparent anesthetic complications, tolerated procedure well and no evidence of recall    Post vital signs: BP (!) 153/78 (BP Location: Right arm, Patient Position: Lying)   Pulse 89   Temp 36.2 °C (97.2 °F) (Temporal)   Resp 20   Ht 6' 1" (1.854 m)   Wt 88.5 kg (195 lb)   SpO2 100%   BMI 25.73 kg/m²     Level of consciousness: awake, alert and oriented    Nausea/Vomiting: no nausea/no vomiting    Complications: none    Airway Patency: patent    Respiratory: unassisted, spontaneous ventilation, fm    Cardiovascular: stable and blood pressure at baseline    Hydration: euvolemic    "

## 2020-09-18 NOTE — BRIEF OP NOTE
[facility]  Brief Operative Note    SUMMARY     Surgery Date: 9/18/2020     Surgeon(s) and Role:     * DENISE Monterroso MD - Primary    Assisting Surgeon: None    Pre-op Diagnosis:  Perirectal abscess [K61.1]    Post-op Diagnosis:  Post-Op Diagnosis Codes:     * Perirectal abscess [K61.1]    Procedure(s) (LRB):  Exam under anesthesia possible seton (N/A)  PLACEMENT, SETON STITCH  INCISION AND DRAINAGE, ABSCESS    Anesthesia: General    Description of Procedure: Incision and drainage of R posterior rectal abscess    Description of the findings of the procedure:  Incision and drainage of R posterior rectal abscess      Estimated Blood Loss: * No values recorded between 9/18/2020 12:03 PM and 9/18/2020 12:43 PM *    Estimated Blood Loss has been documented.         Specimens:   Specimen (12h ago, onward)    None          [id number]

## 2020-09-18 NOTE — CONSULTS
[facility]  Colorectal Surgery  Consult Note    Patient Name: Joss Matute  MRN: [id number]  Admission Date: 9/17/2020  Hospital Length of Stay: 0 days  Attending Physician: Jose Francisco Richardson MD  Primary Care Provider: Primary Doctor No    Inpatient consult to Colorectal Surgery  Consult performed by: Elizabeth Diaz MD  Consult ordered by: Elizabeth Diaz MD        Subjective:     Chief Complaint/Reason for Admission: Perirectal abscess    History of Present Illness:  Mr. Matute is a 34 y.o. male with history of Crohn's colitis and anal fistulas since age 12.  He is currently on Humira.  In December 2019 he was admitted with a perirectal abscess and underwent I&D along with seton placement.  This consisted of a seton between two external counter incisions.  There was no evidence of an internal opening. The seton was removed in clinic on 6/25/2020.    He was seen yesterday 9/16/20 in clinic for rectal pain x 24 hours. There was no drainable abscess on exam and he was started on Augmentin. He has had increased pain and pressure since then which prompted his visit to the ED today. He denies fever, chills, change in bowel habits, drainage or blood. He has noticed difficulty urinating and pain pressure with BMs as well as standing.    (Not in a hospital admission)      Review of patient's allergies indicates:  No Known Allergies    Past Medical History:   Diagnosis Date    Crohn's disease of large intestine with fistula 10/10/2018    Ex-smoker 1/24/2018    Immunosuppressed status 1/27/2020    Penile venereal warts 1/25/2018    Vitamin D deficiency 6/26/2017     Past Surgical History:   Procedure Laterality Date    ABSCESS DRAINAGE      ANAL FISTULOTOMY      COLONOSCOPY      COLONOSCOPY N/A 4/7/2017    Procedure: COLONOSCOPY;  Surgeon: Bubba Spence MD;  Location: [address]);  Service: Endoscopy;  Laterality: N/A;    COLONOSCOPY N/A 10/10/2018     Procedure: COLONOSCOPY;  Surgeon: Bubba Spence MD;  Location: [facility] ENDO (4TH FLR);  Service: Endoscopy;  Laterality: N/A;  schedule as 45 minute case--due 2018    COLONOSCOPY N/A 3/10/2020    Procedure: COLONOSCOPY;  Surgeon: Bubba Spence MD;  Location: [facility] ENDO (4TH FLR);  Service: Endoscopy;  Laterality: N/A;  schedule 40 minute case  2020        EXAMINATION UNDER ANESTHESIA N/A 2019    Procedure: Exam under anesthesia- possible seton;  Surgeon: DENISE Monterroso MD;  Location: [facility] OR 2ND FLR;  Service: Colon and Rectal;  Laterality: N/A;    INCISION AND DRAINAGE OF ABSCESS  2019    Procedure: INCISION AND DRAINAGE, ABSCESS;  Surgeon: DENISE Monterroso MD;  Location: [facility] OR 2ND FLR;  Service: Colon and Rectal;;    INCISION OF PERIRECTAL ABSCESS  2019    Procedure: INCISION, ABSCESS, PERIRECTAL;  Surgeon: DENISE Monterroso MD;  Location: [facility] OR 2ND FLR;  Service: Colon and Rectal;;    INSERTION OF SETON STITCH N/A 2019    Procedure: PLACEMENT, SETON STITCH;  Surgeon: DENISE Monterroso MD;  Location: [facility] OR 2ND FLR;  Service: Colon and Rectal;  Laterality: N/A;    UPPER GASTROINTESTINAL ENDOSCOPY       Family History     Problem Relation (Age of Onset)    ALS Paternal Grandfather        Tobacco Use    Smoking status: Former Smoker     Types: Cigarettes     Quit date: 10/24/2015     Years since quittin.9    Smokeless tobacco: Never Used   Substance and Sexual Activity    Alcohol use: Yes     Comment: week    Drug use: No    Sexual activity: Yes     Partners: Female     Comment:       Review of Systems   Constitutional: Positive for activity change. Negative for chills and fever.   HENT: Negative for trouble swallowing.    Respiratory: Negative for shortness of breath.    Cardiovascular: Negative for chest pain.   Gastrointestinal: Positive for rectal pain. Negative for abdominal pain, blood in stool, constipation, diarrhea, nausea and vomiting.    Genitourinary: Positive for difficulty urinating. Negative for dysuria.   Neurological: Negative for dizziness and light-headedness.     Objective:     Vital Signs (Most Recent):  Temp: 98.4 °F (36.9 °C) (09/17/20 1502)  Pulse: 61 (09/17/20 2247)  Resp: 16 (09/17/20 2021)  BP: 121/61 (09/17/20 2202)  SpO2: 98 % (09/17/20 2247) Vital Signs (24h Range):  Temp:  [98.4 °F (36.9 °C)] 98.4 °F (36.9 °C)  Pulse:  [61-82] 61  Resp:  [16-18] 16  SpO2:  [97 %-100 %] 98 %  BP: (120-134)/(61-84) 121/61     Weight: 88.5 kg (195 lb)  Body mass index is 25.73 kg/m².    Physical Exam  Constitutional:       General: He is not in acute distress.     Appearance: Normal appearance.   Cardiovascular:      Rate and Rhythm: Normal rate.   Pulmonary:      Effort: Pulmonary effort is normal.   Abdominal:      Palpations: Abdomen is soft.   Genitourinary:      Neurological:      Mental Status: He is alert.         Significant Labs:  CBC (Last 3 Results):   Recent Labs   Lab 09/16/20  1535 09/17/20  1632   WBC 11.27  --    RBC 4.89  --    HGB 14.4  --    HCT 44.5 44     --    MCV 91  --    MCH 29.4  --    MCHC 32.4  --        Significant Diagnostics:  CT: Increased perianal soft tissue and partially organized fluid in the right perirectal region measuring 2.0 x 1.4 x 3.3 cm (AP by TV by CC).  Findings likely indicate perianal fistula and small perirectal abscess.    Assessment/Plan:     * History of recurrent perirectal abscess with perianal fistula  35yo M with crohn's colitis and h/o perirectal abscess with fistulas, presents with increased rectal pain x48 hours    - Admit to colorectal surgery  - NPO  - IVF  - Abx: cipro, flagyl  - PRN pain meds  - Plan for EUA today        Thank you for your consult. I will follow-up with patient. Please contact us if you have any additional questions.    Elizabeth Diaz MD  Colorectal Surgery  [facility]

## 2020-09-18 NOTE — ASSESSMENT & PLAN NOTE
33yo M with crohn's colitis and h/o perirectal abscess with fistulas, presents with increased rectal pain x48 hours    - Admit to colorectal surgery  - NPO  - IVF  - Abx: cipro, flagyl  - PRN pain meds  - Plan for EUA today

## 2020-09-18 NOTE — SUBJECTIVE & OBJECTIVE
(Not in a hospital admission)      Review of patient's allergies indicates:  No Known Allergies    Past Medical History:   Diagnosis Date    Crohn's disease of large intestine with fistula 10/10/2018    Ex-smoker 1/24/2018    Immunosuppressed status 1/27/2020    Penile venereal warts 1/25/2018    Vitamin D deficiency 6/26/2017     Past Surgical History:   Procedure Laterality Date    ABSCESS DRAINAGE      ANAL FISTULOTOMY      COLONOSCOPY      COLONOSCOPY N/A 4/7/2017    Procedure: COLONOSCOPY;  Surgeon: Bubba Spence MD;  Location: [facility] ENDO (4TH FLR);  Service: Endoscopy;  Laterality: N/A;    COLONOSCOPY N/A 10/10/2018    Procedure: COLONOSCOPY;  Surgeon: Bubba Spence MD;  Location: [facility] ENDO (4TH FLR);  Service: Endoscopy;  Laterality: N/A;  schedule as 45 minute case--due Fall 2018    COLONOSCOPY N/A 3/10/2020    Procedure: COLONOSCOPY;  Surgeon: Bubba Spence MD;  Location: [facility] ENDO (4TH FLR);  Service: Endoscopy;  Laterality: N/A;  schedule 40 minute case  March 2020        EXAMINATION UNDER ANESTHESIA N/A 12/30/2019    Procedure: Exam under anesthesia- possible seton;  Surgeon: DENISE Monterroso MD;  Location: [facility] OR 2ND FLR;  Service: Colon and Rectal;  Laterality: N/A;    INCISION AND DRAINAGE OF ABSCESS  12/30/2019    Procedure: INCISION AND DRAINAGE, ABSCESS;  Surgeon: DENISE Monterroso MD;  Location: [facility] OR 2ND FLR;  Service: Colon and Rectal;;    INCISION OF PERIRECTAL ABSCESS  12/30/2019    Procedure: INCISION, ABSCESS, PERIRECTAL;  Surgeon: DENISE Monterroso MD;  Location: [facility] OR 2ND FLR;  Service: Colon and Rectal;;    INSERTION OF SETON STITCH N/A 12/30/2019    Procedure: PLACEMENT, SETON STITCH;  Surgeon: DENISE Monterroso MD;  Location: [facility] OR 2ND FLR;  Service: Colon and Rectal;  Laterality: N/A;    UPPER GASTROINTESTINAL ENDOSCOPY       Family History     Problem Relation (Age of Onset)    ALS Paternal Grandfather        Tobacco Use    Smoking status: Former Smoker      Types: Cigarettes     Quit date: 10/24/2015     Years since quittin.9    Smokeless tobacco: Never Used   Substance and Sexual Activity    Alcohol use: Yes     Comment: 12//week    Drug use: No    Sexual activity: Yes     Partners: Female     Comment:       Review of Systems   Constitutional: Positive for activity change. Negative for chills and fever.   HENT: Negative for trouble swallowing.    Respiratory: Negative for shortness of breath.    Cardiovascular: Negative for chest pain.   Gastrointestinal: Positive for rectal pain. Negative for abdominal pain, blood in stool, constipation, diarrhea, nausea and vomiting.   Genitourinary: Positive for difficulty urinating. Negative for dysuria.   Neurological: Negative for dizziness and light-headedness.     Objective:     Vital Signs (Most Recent):  Temp: 98.4 °F (36.9 °C) (20 1502)  Pulse: 61 (20 2247)  Resp: 16 (20)  BP: 121/61 (20 2202)  SpO2: 98 % (20) Vital Signs (24h Range):  Temp:  [98.4 °F (36.9 °C)] 98.4 °F (36.9 °C)  Pulse:  [61-82] 61  Resp:  [16-18] 16  SpO2:  [97 %-100 %] 98 %  BP: (120-134)/(61-84) 121/61     Weight: 88.5 kg (195 lb)  Body mass index is 25.73 kg/m².    Physical Exam  Constitutional:       General: He is not in acute distress.     Appearance: Normal appearance.   Cardiovascular:      Rate and Rhythm: Normal rate.   Pulmonary:      Effort: Pulmonary effort is normal.   Abdominal:      Palpations: Abdomen is soft.   Genitourinary:      Neurological:      Mental Status: He is alert.         Significant Labs:  CBC (Last 3 Results):   Recent Labs   Lab 20  1535 20  1632   WBC 11.27  --    RBC 4.89  --    HGB 14.4  --    HCT 44.5 44     --    MCV 91  --    MCH 29.4  --    MCHC 32.4  --        Significant Diagnostics:  CT: Increased perianal soft tissue and partially organized fluid in the right perirectal region measuring 2.0 x 1.4 x 3.3 cm (AP by TV by CC).  Findings  likely indicate perianal fistula and small perirectal abscess.

## 2020-09-18 NOTE — ASSESSMENT & PLAN NOTE
35yo M with crohn's colitis and h/o perirectal abscess with fistulas, presents with increased rectal pain x48 hours    - Admit to colorectal surgery  - NPO  - IVF  - Abx: cipro, flagyl  - PRN pain meds  - Plan for EUA today

## 2020-09-18 NOTE — PROVIDER PROGRESS NOTES - EMERGENCY DEPT.
Encounter Date: 9/17/2020    ED Physician Progress Notes        Physician Note:    I assumed care of Mr. Matute from Dr. Dorantes.  CT abdomen pelvis with IV and water contrast suggest a perirectal abscess with a perianal fistula. We contacted Colorectal surgery who came and evaluated the patient.  After their evaluation, they are admitting to their service for further care and possible drainage of abscess.

## 2020-09-18 NOTE — HPI
Mr. Matute is a 34 y.o. male with history of Crohn's colitis and anal fistulas since age 12.  He is currently on Humira.  In December 2019 he was admitted with a perirectal abscess and underwent I&D along with seton placement.  This consisted of a seton between two external counter incisions.  There was no evidence of an internal opening. The seton was removed in clinic on 6/25/2020.    He was seen yesterday 9/16/20 in clinic for rectal pain x 24 hours. There was no drainable abscess on exam and he was started on Augmentin. He has had increased pain and pressure since then which prompted his visit to the ED today. He denies fever, chills, change in bowel habits, drainage or blood. He has noticed difficulty urinating and pain pressure with BMs as well as standing.

## 2020-09-18 NOTE — DISCHARGE SUMMARY
[facility]  DISCHARGE SUMMARY  General Surgery      Admit Date:  9/17/2020    Discharge Date and Time:  9/18/2020  12:00 PM    Attending Physician:  No att. providers found     Discharge Provider:  Aracelis Ross MD     Reason for Admission:  Perirectal abscess     Procedures Performed:  Procedure(s) (LRB):  Exam under anesthesia possible seton (N/A)  PLACEMENT, SETON STITCH  INCISION AND DRAINAGE, ABSCESS    Hospital Course:  Please see the preoperative H&P and other available documentation for full details related to history prior to this admission.  Briefly, Joss Matute is a 34 y.o. male who was admitted following scheduled elective surgery for Perirectal abscess    Following a complete preoperative discussion of the risks and benefits of surgery with signed informed consent, the patient was taken to the operating room on 9/18/2020 and underwent the above stated procedures.  The patient tolerated surgery well and there were no complications.  Please see the operative report for full intraoperative findings and details.  Postoperatively, the patient did well and was transferred from the PACU to the floor in stable condition where they had a stable and uncomplicated hospital course.  Labs and vital signs remained stable and appropriate throughout course.  Diet was advanced as tolerated and the patient's pain was controlled on oral pain medications without problem.  Currently, the patient is doing well at Day of Surgery and is stable and appropriate for discharge home at this time.      Consults:  None.    Significant Diagnostic Studies:   Recent Labs   Lab 09/16/20  1535 09/17/20  1632 09/18/20  0259   WBC 11.27  --  11.56   HGB 14.4  --  13.8*   HCT 44.5 44 42.5     --  320     Recent Labs   Lab 09/16/20  1535   CRP 0.3   No results for input(s): INR, PTT, LABHEPA, LACTATE, TROPONINI, CPK, CPKMB, MB, BNP in the last 72 hours.No results for input(s): PH, PCO2, PO2, HCO3 in the  last 72 hours.      Final Diagnoses:   Principal Problem:  Perirectal abscess   Secondary Diagnoses:    Active Hospital Problems    Diagnosis  POA    *History of recurrent perirectal abscess with perianal fistula [K61.1]  Yes    Perianal abscess [K61.0]  Yes      Resolved Hospital Problems   No resolved problems to display.       Discharged Condition:  Good    Disposition:  Home or Self Care    Follow Up/Patient Instructions:     Medications:  Reconciled Home Medications:    Discharge Medication List as of 9/18/2020  2:46 PM      START taking these medications    Details   ondansetron (ZOFRAN-ODT) 4 MG TbDL Take 1 tablet (4 mg total) by mouth every 6 (six) hours as needed., Starting Fri 9/18/2020, Print      oxyCODONE (ROXICODONE) 5 MG immediate release tablet Take 1 tablet (5 mg total) by mouth every 4 (four) hours as needed for Pain., Starting Fri 9/18/2020, Print         CONTINUE these medications which have NOT CHANGED    Details   adalimumab (HUMIRA) PnKt injection Inject 0.8 mLs (40 mg total) into the skin every 7 days., Starting Mon 5/18/2020, Normal      amoxicillin-clavulanate 875-125mg (AUGMENTIN) 875-125 mg per tablet Take 1 tablet by mouth every 12 (twelve) hours., Starting Wed 9/16/2020, Normal      cyanocobalamin (VITAMIN B-12) 1000 MCG tablet Take 100 mcg by mouth once daily., Historical Med      vitamin D (VITAMIN D3) 1000 units Tab Take 2,000 Units by mouth once daily., Historical Med      loperamide (IMODIUM) 2 mg capsule Take 2 mg by mouth., Historical Med           Discharge Procedure Orders   No driving until:   Order Comments: No driving while taking opioid pain medications.     Notify your health care provider if you experience any of the following:  temperature >100.4     Notify your health care provider if you experience any of the following:  persistent nausea and vomiting or diarrhea     Notify your health care provider if you experience any of the following:  severe uncontrolled pain      Notify your health care provider if you experience any of the following:  redness, tenderness, or signs of infection (pain, swelling, redness, odor or green/yellow discharge around incision site)     Notify your health care provider if you experience any of the following:  difficulty breathing or increased cough     Notify your health care provider if you experience any of the following:  severe persistent headache     Notify your health care provider if you experience any of the following:  worsening rash     Notify your health care provider if you experience any of the following:  persistent dizziness, light-headedness, or visual disturbances     Notify your health care provider if you experience any of the following:  increased confusion or weakness     Notify your health care provider if you experience any of the following:     Change dressing (specify)   Order Comments: Dressing change: 2 times per day using gauze pad.     Activity as tolerated     Shower on day dressing removed (No bath)   Order Comments: Please soak bottom in tub for 10 minutes twice daily or use hand shower to irrigate wound twice daily.     Follow-up Information     Call  [facility].    Specialty: Colon and Rectal Surgery  Why: As needed  Contact information:  [address]  [facility] [address] [phone]  Additional information:  [facility] 4[facility]   Please park in [facility] and use [facility] elevator           H Javier Monterroso MD In 2 weeks.    Specialty: Colon and Rectal Surgery  Contact information:  [address]  [facility] [address] [phone]                   Aracelis Ross MD

## 2020-09-18 NOTE — NURSING
Discharge Note: Pt ready for discharge. Discharge instructions given and explained to pt. Pt verbalized understanding. Prescriptions are to be picked up at local pharmacy. IV removed. No further questions from pt at this time. Pt to be discharged via wheelchair. Will cont to monitor until discharge.

## 2020-09-18 NOTE — ED NOTES
Patient resting in stretcher and is in NAD at this time. Pt is awake alert and oriented x4, VSS, respirations even and unlabored.  Bed low and locked , call bell in pt reach. Pt voices no needs at this time.  Will continue to monitor.

## 2020-09-19 NOTE — OP NOTE
Date of procedure:   September 18, 2020  A  Indications for procedure:  34-year-old male with history of Crohn's disease and complex perianal fistula in ANO disease.  He has previously undergone incision and drainage with seton placement.  He had resolution symptoms and subsequently had setons removed without any anal pain or discharge.  The wound is completely healed.  Unfortunately he has developed a recurrent perianal pain.  The patient was admitted the hospital the CT scan revealed evidence of a perianal abscess.  He is brought to the operating room today for exam under anesthesia with incision and drainage    Preoperative diagnosis:   Recurrent perianal abscess    Postoperative diagnosis:  Same    Name of procedure:  Exam under anesthesia, incision and drainage of abscess, insertion of seton    Surgeon:   DENISE Monterroso MD    Assistant surgeon:  Aracelis Ross MD    Type of anesthesia:  GETA    EBL:  10  Cc's    Drains:  none    Specimen:  Fluid from abscess sent for Gram stain culture and sensitivity    Findings:  CT scan showed evidence of a perianal fluid collection in the right posterior position.  This was confirmed at the time of surgery.   Purulent material was drained in the perianal space in the right posterior position.  There was noted to be evidence of fistula at the level of the dentate line the posterior midline.    Technique in detail:  Patient was brought to the operating room positive identified and remained on the gurney in the supine position.  He underwent general endotracheal anesthesia without complication.  He was then positioned on the operating table in the prone lyndsey-knife position on a Roby frame.  The operative site was clipped of a tear and expose by taping the buttocks cheek to to par.  His perineum was prepared and draped in usual sterile fashion.  Critical time-out was performed.    Exam under anesthesia revealed scars both sides the anus as well as the posterior midline  consistent with previous horseshoe abscess.  There was some deformity in the right posterior position with swelling evident.  Minimal amount of erythema was noted.  There was fluctuance noted.  Incisions were made without any drainage of purulence initially in the right posterior position overlying the area of concern.  I then explored the space with a finer needle, 18 gauge needle with syringe.  Purulent material was noted to be accessed and this appeared to be in the space heading towards the posterior midline.  I made a counter incision over the posterior midline and entered a cavity but no pus was noted.  I made a 3rd incision in between the 2 previously made incisions and purulence was noted drain.  The wounds were irrigated with saline solution.  I explored the 3rd wound in the right posterior position there is noted to be a tract which coursed to the posterior midline at the level of the dental line consistent with a fistula in ANO.  We encircled this fistula tract using a blue vessel loop which was loosely tied to itself with Ethibond suture.  Hemostasis was assured.    Local anesthetic was then infiltrated the perianal skin.  Dry gauze dressing was applied to the operative site and held place with mesh underwear.  The patient was then returned to the [facility] in the supine position, awakened from anesthesia and extubated without incident.  The patient was returned to the recovery area in stable condition.

## 2020-09-21 LAB
BACTERIA SPEC AEROBE CULT: ABNORMAL

## 2020-09-21 NOTE — PLAN OF CARE
Future Appointments   Date Time Provider Department Center   1/13/2021 11:00 AM Bubba Spence MD [facility]       Patient discharged home to care of self on 9/18/20.   09/21/20 1516   Final Note   Assessment Type Final Discharge Note   Anticipated Discharge Disposition Home   What phone number can be called within the next 1-3 days to see how you are doing after discharge?   ([phone])   Hospital Follow Up  Appt(s) scheduled? Yes   Discharge plans and expectations educations in teach back method with documentation complete? Yes

## 2020-09-24 LAB
BACTERIA SPEC ANAEROBE CULT: ABNORMAL
BACTERIA SPEC ANAEROBE CULT: ABNORMAL

## 2020-09-26 ENCOUNTER — TELEPHONE (OUTPATIENT)
Dept: PHARMACY | Facility: CLINIC | Age: 35
End: 2020-09-26

## 2020-09-26 NOTE — TELEPHONE ENCOUNTER
Pt confirmed shipping of Humira on  to arrive on . Address and  verified, $0 copay in 004. Next dose due .  Pt has 1 dose on hand.  Pt reported no missed doses. Pt was placed on Augmentin + Oxycodone after his abscess surgery.  Pt already has taken 11 out of the 14 day antibiotic course. Pt has continued injecting Humira despite being on antibiotics. Abx prescribed/started on  and pt injected Humira last .  Pt stated his MD did not specify as to whether or not he was supposed to continue Humira while on antibiotics.  Pt's discharge paperwork states to continue both Humira and Augmentin.  No significant drug interactions with new medications. Advised to call [facility] in the future and we can always confirm with MD.  Pt stated he is no longer experiencing any signs or symptoms of infection.  Advised pt that given he will be almost done with antibiotics by tomorrow and he has no signs/symptoms of infection and he is healing well, reasonable to proceed with regularly scheduled Humira injection tomorrow.  Pt had no further questions.  Pt needs sharps container.

## 2020-10-07 ENCOUNTER — OFFICE VISIT (OUTPATIENT)
Dept: SURGERY | Facility: CLINIC | Age: 35
End: 2020-10-07
Payer: COMMERCIAL

## 2020-10-07 VITALS
HEART RATE: 88 BPM | BODY MASS INDEX: 25.62 KG/M2 | SYSTOLIC BLOOD PRESSURE: 112 MMHG | WEIGHT: 193.31 LBS | DIASTOLIC BLOOD PRESSURE: 75 MMHG | HEIGHT: 73 IN

## 2020-10-07 DIAGNOSIS — K61.2 ABSCESS OF ANAL AND RECTAL REGIONS: Primary | ICD-10-CM

## 2020-10-07 PROCEDURE — 99024 POSTOP FOLLOW-UP VISIT: CPT | Mod: S$GLB,,, | Performed by: COLON & RECTAL SURGERY

## 2020-10-07 PROCEDURE — 99999 PR PBB SHADOW E&M-EST. PATIENT-LVL III: CPT | Mod: PBBFAC,,, | Performed by: COLON & RECTAL SURGERY

## 2020-10-07 PROCEDURE — 99999 PR PBB SHADOW E&M-EST. PATIENT-LVL III: ICD-10-PCS | Mod: PBBFAC,,, | Performed by: COLON & RECTAL SURGERY

## 2020-10-07 PROCEDURE — 99024 PR POST-OP FOLLOW-UP VISIT: ICD-10-PCS | Mod: S$GLB,,, | Performed by: COLON & RECTAL SURGERY

## 2020-10-07 NOTE — PROGRESS NOTES
HPI:  Joss Matute is a 34 y.o. male with history of fistula in ano.  S/p EUA and draingage with SETON.     Feels well.  Mild soreness. No acute pain.  No fever.  Drainage - 2 pads per day.        Past Medical History:   Diagnosis Date    Crohn's disease of large intestine with fistula 10/10/2018    Ex-smoker 1/24/2018    Immunosuppressed status 1/27/2020    Penile venereal warts 1/25/2018    Vitamin D deficiency 6/26/2017        Past Surgical History:   Procedure Laterality Date    ABSCESS DRAINAGE      ANAL FISTULOTOMY      COLONOSCOPY      COLONOSCOPY N/A 4/7/2017    Procedure: COLONOSCOPY;  Surgeon: Bubba Spence MD;  Location: [facility] ENDO (4TH FLR);  Service: Endoscopy;  Laterality: N/A;    COLONOSCOPY N/A 10/10/2018    Procedure: COLONOSCOPY;  Surgeon: Bubba Spence MD;  Location: [facility] ENDO (4TH FLR);  Service: Endoscopy;  Laterality: N/A;  schedule as 45 minute case--due Fall 2018    COLONOSCOPY N/A 3/10/2020    Procedure: COLONOSCOPY;  Surgeon: Bubba Spence MD;  Location: [facility] ENDO (4TH FLR);  Service: Endoscopy;  Laterality: N/A;  schedule 40 minute case  March 2020        EXAMINATION UNDER ANESTHESIA N/A 12/30/2019    Procedure: Exam under anesthesia- possible seton;  Surgeon: DENISE Monterroso MD;  Location: [facility] OR 2ND FLR;  Service: Colon and Rectal;  Laterality: N/A;    EXAMINATION UNDER ANESTHESIA N/A 9/18/2020    Procedure: Exam under anesthesia possible seton;  Surgeon: DENISE Monterroso MD;  Location: [facility] OR 2ND FLR;  Service: Colon and Rectal;  Laterality: N/A;    INCISION AND DRAINAGE OF ABSCESS  12/30/2019    Procedure: INCISION AND DRAINAGE, ABSCESS;  Surgeon: DENISE Monterroso MD;  Location: [facility] OR 2ND FLR;  Service: Colon and Rectal;;    INCISION AND DRAINAGE OF ABSCESS  9/18/2020    Procedure: INCISION AND DRAINAGE, ABSCESS;  Surgeon: DENISE Monterroso MD;  [facility];  Service: Colon and Rectal;;    INCISION OF PERIRECTAL ABSCESS  12/30/2019     Procedure: INCISION, ABSCESS, PERIRECTAL;  Surgeon: DENISE Monterroso MD;  Location: [facility] OR 2ND FLR;  Service: Colon and Rectal;;    INSERTION OF SETON STITCH N/A 2019    Procedure: PLACEMENT, SETON STITCH;  Surgeon: DENISE Monterroso MD;  Location: [facility] OR 2ND FLR;  Service: Colon and Rectal;  Laterality: N/A;    INSERTION OF SETON STITCH  2020    Procedure: PLACEMENT, SETON STITCH;  Surgeon: DENISE Monterroso MD;  Location: [facility] OR 2ND FLR;  Service: Colon and Rectal;;    UPPER GASTROINTESTINAL ENDOSCOPY         Review of patient's allergies indicates:  No Known Allergies    Family History   Problem Relation Age of Onset    ALS Paternal Grandfather     Celiac disease Neg Hx     Cirrhosis Neg Hx     Colon cancer Neg Hx     Colon polyps Neg Hx     Crohn's disease Neg Hx     Cystic fibrosis Neg Hx     Esophageal cancer Neg Hx     Hemochromatosis Neg Hx     Inflammatory bowel disease Neg Hx     Irritable bowel syndrome Neg Hx     Liver cancer Neg Hx     Liver disease Neg Hx     Rectal cancer Neg Hx     Stomach cancer Neg Hx     Ulcerative colitis Neg Hx     Roby's disease Neg Hx     Lupus Neg Hx     Multiple sclerosis Neg Hx     Tuberculosis Neg Hx     Rheum arthritis Neg Hx     Scleroderma Neg Hx     Lymphoma Neg Hx        Social History     Socioeconomic History    Marital status:      Spouse name: Not on file    Number of children: Not on file    Years of education: Not on file    Highest education level: Not on file   Occupational History    Not on file   Social Needs    Financial resource strain: Not on file    Food insecurity     Worry: Not on file     Inability: Not on file    Transportation needs     Medical: Not on file     Non-medical: Not on file   Tobacco Use    Smoking status: Former Smoker     Types: Cigarettes     Quit date: 10/24/2015     Years since quittin.9    Smokeless tobacco: Never Used   Substance and Sexual Activity    Alcohol use: Yes  "    Comment: 12//week    Drug use: No    Sexual activity: Yes     Partners: Female     Comment:     Lifestyle    Physical activity     Days per week: Not on file     Minutes per session: Not on file    Stress: Not on file   Relationships    Social connections     Talks on phone: Not on file     Gets together: Not on file     Attends [employer] service: Not on file     Active member of club or organization: Not on file     Attends meetings of clubs or organizations: Not on file     Relationship status: Not on file   Other Topics Concern    Not on file   Social History Narrative     at [employer]        ROS:  GENERAL: No fever, chills, fatigability or weight loss.  Integument: No rashes, redness, icterus  CHEST: Denies DAY, cyanosis, wheezing, cough and sputum production.  CARDIOVASCULAR: Denies chest pain, PND, orthopnea or reduced exercise tolerance.  GI: Denies abd pain, dysphagia, nausea, vomiting, no hematemesis   : Denies burning on urination, no hematuria, no bacteriuria  MSK: No deformities, swelling, joint pain swelling  Neurologic: No HAs, seizures, weakness, paresthesias, gait problems    PE:  General appearance well  /75 (BP Location: Left arm, Patient Position: Sitting, BP Method: Large (Automatic))   Pulse 88   Ht 6' 1" (1.854 m)   Wt 87.7 kg (193 lb 5.5 oz)   BMI 25.51 kg/m²   Sclera/ Skin anicteric  AT NC EOMI  Neck supple trachea midline   Chest symmetric, nl excursion, no retractions, breathing comfortably  EXT - no CCE  Neuro:  Mood/ affect nl, alert and oriented x 3, moves all ext's, gait nl    Rectal  Inspection      Wounds clean.  No swelling.  No fluctuance.  No erythema      Assessment:  Fistula/ Abscess well drained.  Seton in good position      Plan:  Continue soaks BID  Pad change prn  OV 4 weeks      "

## 2020-10-08 ENCOUNTER — TELEPHONE (OUTPATIENT)
Dept: GASTROENTEROLOGY | Facility: CLINIC | Age: 35
End: 2020-10-08

## 2020-10-08 NOTE — TELEPHONE ENCOUNTER
----- Message from Bubba Spence MD sent at 10/8/2020  3:59 PM CDT -----  Call pt and let him know that due to recent abscess that we would like to move up his appt. Cancel 1/2021 appt and schedule for Oct or Nov in person

## 2020-10-11 NOTE — PROGRESS NOTES
"     [facility]             Inflammatory Bowel Disease  Follow-up  Note              TODAY'S VISIT DATE:  10/12/2020    Chief Complaint:   Chief Complaint   Patient presents with    Crohn's Disease     PCP: Primary Doctor No    Previous History:  Joss Matute is a 34 y.o. male with Crohn's disease (ileum, rectal ulcer, recurrent perianal fistula/abscess- 12/96, 3/2017, 12/2019, setons last removed 6/2020).  He began with symptoms of a perirectal abscess in December 1996 that was initially drained in the ED.  The abscess recurred in December 1997 and was associated with vomiting, weight loss, and abdominal pain.  His initial colonoscopy in 1998 (no actual record just reported in clinic note) showed moderately severe ileitis with biopsies of the right/transverse/left colon and rectum with mild nonspecific acute and chronic reactive changes.  The transverse colon polyp (likely removed) pathology showed it to be "edematous with mild glandular dysplasia."  At that time he was finally diagnosed with Crohn's disease but unsure of the exact location.  Initially he recalls being given prednisone for 4-5 months and pentasa for maintenance.  By 2002, age 17, he stopped all medications and did not have any GI follow up.  He had a hospital admission for a "flare" with dehydration in 2004 and again was not started on any medications.  He was seen in 2007 for RLQ abdominal cramping, increasing fatigue, and increased bowel frequency with blood and mucous.  He had a repeat colonoscopy in 2/2007 that showed patchy area of mucosa in the distal 4 cm of the TI with normal biopsies.  He had recurrent abscess/fistula in 2008 that spontaneously drained and was seen by CRS and did not wish to take any medications.  He reports intermittent abscess/perianal fistula from [id number] with chronic drainage that would cause he to intermittently seek treatment of I&D in the ER.  In 10/2016 he established care with Dr. Bryson " when he had a recurrent perianal fistula who planned to start patient on humira after surgical intervention.  He was seen by Dr. Cason on 3/15/2017 and had an EUA with seton placement.  Colonoscopy 4/7/2017 with Dr. LEANDRO Spence showed showed perianal skin tag, seton intact and small rectal ulcer with biopsies consistent with patchy active colitis with features of chronic mucosal injury.  He started Humira on 5/1/2017.  Setons were removed by Dr. Cason in 7/2017.  He started oral MTX in 8/2017 for immunogenicity prevention.  Trough [facility] humira levels were 2.5 with ABs 57 (low) on 1/8/2018 so we planned to increase humira to 40 mg SC weekly and start oral MTX 12.5 mg PO weekly with daily folic acid.  He started humira 40 mg SC every 7 days on 2/6/2018.  Repeat trough [facility] Humira levels were 15 with low ABs of 30 on 5/17/2018.  He had elevated LFTs on 8/13/2018 so oral MTX was discontinued.  Colonoscopy on 10/10/2018 showed a small perianal skin tar, rectal fistula, other normal rectum, colon, and ileum with normal biopsies.  Trough [facility] Humira level 20 with low ABs 26 on 11/16/2018 on humira 40 mg SC every 7 days.  Patient was seen by Dr. Monterroso to assess his fistula and discuss repair on 3/13/2019.  MRI on 3/20/2019 ordered by Dr. Monterroso showed three perianal fistulas with 2 appeared to have blind endings and one extending into the skin surface at the right medial gluteal fold.  On 5/13/2019 patient had some extreme diarrhea and abdominal pain last for a few hours and then resolved.  On 12/29/2019 patient had perianal drainage and rectal pain and started on ciprofloxacin/Flagyl.  He had exam under anesthesia on 12/30/2019 which showed at which time perirectal abscess was drained and seton inserted.  On 1/2/2020 patient had Humira level of 13 with antibodies 29.  Colonoscopy on 3/10/2020 showed anal skin tags with seton int with a few areas mucosal scarring in the rectum and remainder of the colon and  "terminal ileum normal.  MRE on 3/10/2020 showed some mild postcontrast enhancement of the terminal ileum likely due to chronic inflammatory change though no active disease and perirectal fistulas better visualized on the anal sphincter MRI noting interval seton placement.    Interval History:  - current IBD meds: Humira 40 mg SC q weekly (started 17, increased to weekly 18, last dose 20, next dose 20)  - other GI meds: imodium 2-4 times/week  - 3-4 formed BMs/day, no blood, no urgency, pain with wiping at seton site  - mild discomfort at seton location, increased with prolonged sitting  -  LFTs elevated, taking traci man vitamin, amino acids and creatine (discontinued).  -  Dr. Monterroso. Anal pain resolved, no swelling, occasional drainage, 3BM/day. Wounds healed  - 9/15 dull pain at seton site (1-2/10) x1 day, no fever, no discharge, swollen.  -  Dr. [facility]. Healed wound, no mass/fluctuance, ?early abscess (no drainable collection on exam), prescribed augmentin and MRI anal sphincter (not performed)  -  worsening pain (2-3 -> 7-8/10 with ambulation). Hospitalized [facility] ( - ). CT with perianal abscess. EUA with fistula at level of dentate line. Pus drained. Seton placed. Discharged on augmentin  - 10/7 Dr [facility]. Healing well. Seton in position.  - no longer on antibiotics, completed 2 week course  - alcohol history: was drinking 20 drinks/week and now about 2-3 drink q2d  - NSAID use: No  - Narcotic use: No  - Alternative/complementary meds for IBD: No      Prior Pertinent Surgeries:   previous fistula repair Xs 2  3/15/2017: EUA with seton placement  20 Setons removed   Dr. Monterroso. anal fistula EUA with seton placement    Pertinent Endoscopy/Imagin Colonoscopy: (per clinic note path only) showed moderately severe ileitis; right, transverse, left, and rectum showed mild non-specific acute and chronic reactive changes; " transverse polyp path showed " "it to be edematous with mild glandular dysplasia"  2/22/2007 Colonoscopy: patchy area of mucosa in the ileum was nodular and granular (most distal 4 cm of TI) (path: normal); moderate amount of semisolid stool in the entire colon  5/23/2012 SBFT: normal  1/6/2017 Colonoscopy: fistula in the mauricio-anal area with no active drainage; normal mucosa in the whole colon and TI (no biopsies)  1/18/2017 MRE: no definitive evidence for abnormal mucosal enhancement within the small bowel; suspicious early fistulous tract/fissure within the distal rectum/perianal region tracking along the 9 oclock position rotated into the 6 oclock position and exiting along the anal canal/fissure; no evidence for abnormal enhancing structures within the abdomen/pelvis  4/7/2017 Colonoscopy: perianal skin tag; small rectal ulcer in the rectum (seen on retroflexion) (path: ulcer and adjacent patchy active colitis and features of chronic mucosal injury), remainder colon normal (path: normal); normal TI (path: normal); seton intact   3/20/2019 MRI pelvis: Three perianal fistulous, two appear blind ending and one of which extends to the skin surface at the right medial gluteal fold.  No evidence for abscess  3/10/20 colonoscopy: skin tags and seton intact, few rare mucosal scars in rectum with remainder of rectum and colon normal. Terminal ileum normal. Biopsies of terminal ileum normal, biopsies of rectum with minimal architectural distortion  3/10/20 MRE:  Mild postcontrast enhancement of the terminal ileum wall likely due to chronic inflammatory change without wall thickening or intraluminal stricture. Perirectal fistulas are better visualized on prior dedicated anal sphincter MRI noting interval seton placement.  9/17/20 CTAP increased perianal soft tissue and partially organized fluid in right perirectal region (2.0 x 1.4 x 3.3cm)    Pertinent Labs:  Lab Results   Component Value Date    SEDRATE 2 04/24/2017    CRP 0.3 09/16/2020     Lab " Results   Component Value Date    TTGIGA 7 04/24/2017     04/24/2017     No results found for: TSH, FREET4  Lab Results   Component Value Date    LHGCTUEP16VM 26 (L) 07/13/2020    YEZNXTRT01 346 07/13/2020     Lab Results   Component Value Date    HEPBSAG Negative 07/13/2020    HEPBCAB Negative 07/13/2020     No results found for: SAB84SYMR  Lab Results   Component Value Date    NIL 0.050 01/27/2020    MITOGENNIL 6.520 01/27/2020    TSPOTSCREN See result image under hyperlink 11/29/2018     Lab Results   Component Value Date    TPTMINTERP SEE BELOW 04/24/2017     Therapeutic Drug Monitoring Labs:  1/8/2018: Trough [facility] ADA Levels 2.5, ABs 57 (low) (humira 40 mg SC every 14 days)  5/17/2018: [facility] Trough Humira Level 15, ABs 30 (low) (humira 40 mg SC every 7 days)  11/16/2018: Trough [facility] Humira Level 20, ABs 26 (low) (humira 40 mg SC every 7 days)  1/2/20 trough humira level 13/Abs 29 (low) (humira 40 mg SC every 7 days)    Prior IBD Meds:  Prednisone  Pentasa  MTX 12.5 mg PO weekly (started 8/2017, stopped 8/13/2018 due to elevated LFTs)    Vaccinations:  Lab Results   Component Value Date    HEPBSAB Positive (A) 08/13/2018     Lab Results   Component Value Date    HEPAIGG Positive (A) 08/13/2018     Lab Results   Component Value Date    VARICELLAZOS 2.98 (H) 04/24/2017    VARICELLAINT Positive (A) 04/24/2017     Immunization History   Administered Date(s) Administered    Hepatitis A / Hepatitis B 01/25/2018, 02/22/2018, 07/26/2018    Pneumococcal Conjugate - 13 Valent 04/24/2017    Pneumococcal Polysaccharide - 23 Valent 07/25/2017    Tdap 04/24/2017     Influenza (inactive): 2019, due this fall, pt reminded  HPV (males and females ages 19-25 yo): n/a  Meningococcal:  UTD with childhood vaccines  MMR (live vaccine): UTD with childhood vaccines  Shingrix: will need    Review of Systems   Constitutional: Negative for chills, fever and weight loss.   Eyes: Negative for pain and redness.    Respiratory: Negative for cough and shortness of breath.    Cardiovascular: Negative for chest pain.   Gastrointestinal: Negative for abdominal pain, heartburn, nausea and vomiting.   Genitourinary: Negative for hematuria.   Musculoskeletal: Negative for back pain.   Skin: Negative for rash.   Psychiatric/Behavioral: Negative for depression. The patient is not nervous/anxious.      All Medical History/Surgical History/Family History/Social History/Allergies have been reviewed and updated in EMR    Review of patient's allergies indicates:  No Known Allergies    Outpatient Medications Marked as Taking for the 10/12/20 encounter (Office Visit) with Bubba Spence MD   Medication Sig Dispense Refill    adalimumab (HUMIRA) PnKt injection Inject 0.8 mLs (40 mg total) into the skin every 7 days. 4 pen 5    cyanocobalamin (VITAMIN B-12) 1000 MCG tablet Take 100 mcg by mouth once daily.      vitamin D (VITAMIN D3) 1000 units Tab Take 2,000 Units by mouth once daily.      [DISCONTINUED] ondansetron (ZOFRAN-ODT) 4 MG TbDL Dissolve  1 tablet (4 mg total) by mouth every 6 (six) hours as needed. 20 tablet 0       Physical Exam   Constitutional: He is oriented to person, place, and time and well-developed, well-nourished, and in no distress. No distress.   Well dressed and groomed, no distress.    HENT:   Head: Normocephalic and atraumatic.   Eyes: Conjunctivae are normal. Right eye exhibits no discharge. Left eye exhibits no discharge. No scleral icterus.   Cardiovascular: Normal rate, regular rhythm and normal heart sounds.   Pulmonary/Chest: Effort normal and breath sounds normal. No respiratory distress.   Abdominal: Soft. Bowel sounds are normal. He exhibits no distension. There is no abdominal tenderness. There is no rebound and no guarding.   Soft, non-tender.   Musculoskeletal:         General: No tenderness, deformity or edema.   Lymphadenopathy:     He has no cervical adenopathy.   Neurological: He is alert and  oriented to person, place, and time.   Skin: Skin is warm. No rash noted. He is not diaphoretic.   Psychiatric: Mood, affect and judgment normal.     Labs:  Lab Results   Component Value Date    CRP 0.3 09/16/2020     Lab Results   Component Value Date    HEPBSAG Negative 07/13/2020    HEPBCAB Negative 07/13/2020     Lab Results   Component Value Date    TBGOLDPLUS Negative 01/27/2020     Lab Results   Component Value Date    PFDAEBHB92BP 26 (L) 07/13/2020    ZLNQUTRB12 346 07/13/2020     Lab Results   Component Value Date    WBC 11.56 09/18/2020    HGB 13.8 (L) 09/18/2020    HCT 42.5 09/18/2020    MCV 91 09/18/2020     09/18/2020     Lab Results   Component Value Date    CREATININE 0.8 07/13/2020    ALBUMIN 4.5 08/11/2020    BILITOT 0.7 08/11/2020    ALKPHOS 34 (L) 08/11/2020    AST 20 08/11/2020    ALT 32 08/11/2020     Assessment/Plan:  Joss Matute is a 34 y.o. male with Crohn's disease (ileum, rectal ulcer, recurrent perianal fistula/abscess- 12/96, 3/2017, 12/2019, setons last removed 6/2020) who continues on Humira 40 mg SC weekly.  Was well until 9/15 with development of anal pain, and evidence of anal fistula s/p EUA and seton placement (9/18). He is currently on weekly humira with appropriate level (13, abs low 1/2020) and on last colonoscopy ileal disease in remission, however, he has evidence of active mauricio-anal disease. Discussed with patient the options of continuing current regiment and maintaining setons for longer duration (if not indefinitely), versus treatment change. Best option given mauricio-anal disease would be remicade, but we discussed that though optimistic the ileal disease would remain in remission there is no guarantee this would be effective for his mauricio-anal disease. 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 With ongoing  discussion he shares his discomfort with seton therapy and would likely be interested in attempting remicade therapy (discussed requirement for chronic infusion therapy which he is amenable to). We will continue current regiment, repeat humira trough, we will discuss with Dr Monterroso and then will discuss further with patient to determine next step.    # Crohn's disease (ileum, rectal ulcer, recurrent perianal fistula/abscess- 12/1996, 3/2017, 12/2019, 9/2020)  - continue Humira 40 mg SC every weekly  - colonoscopy - timing to be determined  - vitamin B12 (7/13/20 vit B12 346)- continue Vit B12 1000 mcg daily  - ex-smoker:  Quit fall 2015, discussed risks of smoking and CD with patient in past  - CRC Risk: personal h/o of sporadic adenoma in 1998, proctitis/ileal disease with symptom onset in 1998; surveillance every 5 years   - drug monitoring labs: CBC/CMP every 6 months (due 1/2021), TPMT (normal 4/2017), TB quantiferon (negative 1/2020, repeat 1/2021), Hep B testing (HBsAg, HBcAb negative 7/2020, repeat 7/2021)  - TDM:  Repeat trough levels/abs (last one 1/2020) due to recurrent abscess    # Abnormal LFTs. Mild elevation of transaminases (AST 49, ALT 54) 7/2020 which normalized on repeat (8/11). ?related to supplements. Was drinking 5/day but not classic ETOH pattern.  - discontinued supplements  - discussed etoh moderation  - check HCV, HIV with next labs, HAV immune (8/2018)  - monitor    # IBD specific health maintenance:  Counseled on reducing alcohol intake   Skin exam yearly - recommended, appt canceled due to covid  Risk for osteopenia/osteoporosis-none  Vitamin D (7/2020 vit D 26)- increased to vit D3 5000 IU/day, repeat vit D in 1 year  Vaccines: no live vaccines    Follow up: 6 mos     Tyson Monroe M.D.  Gastroenterology Fellow, PGY-VI  [facility]    I have reviewed and concur with the fellow's history, physical, assessment, and plan.  I have personally interviewed and examined the  patient. The above note has been edited to reflect my recommendations.     Bubba Spence MD   Department of Gastroenterology  Medical Director, Inflammatory Bowel Disease

## 2020-10-12 ENCOUNTER — OFFICE VISIT (OUTPATIENT)
Dept: GASTROENTEROLOGY | Facility: CLINIC | Age: 35
End: 2020-10-12
Attending: INTERNAL MEDICINE
Payer: COMMERCIAL

## 2020-10-12 VITALS
BODY MASS INDEX: 25.74 KG/M2 | DIASTOLIC BLOOD PRESSURE: 85 MMHG | HEART RATE: 104 BPM | OXYGEN SATURATION: 98 % | HEIGHT: 73 IN | TEMPERATURE: 98 F | SYSTOLIC BLOOD PRESSURE: 135 MMHG | WEIGHT: 194.25 LBS

## 2020-10-12 DIAGNOSIS — K50.113 CROHN'S DISEASE OF LARGE INTESTINE WITH FISTULA: Primary | ICD-10-CM

## 2020-10-12 DIAGNOSIS — D84.9 IMMUNOSUPPRESSED STATUS: ICD-10-CM

## 2020-10-12 DIAGNOSIS — E55.9 VITAMIN D DEFICIENCY: ICD-10-CM

## 2020-10-12 DIAGNOSIS — K61.0 PERIANAL ABSCESS: ICD-10-CM

## 2020-10-12 PROCEDURE — 3008F PR BODY MASS INDEX (BMI) DOCUMENTED: ICD-10-PCS | Mod: CPTII,S$GLB,, | Performed by: INTERNAL MEDICINE

## 2020-10-12 PROCEDURE — 99215 PR OFFICE/OUTPT VISIT, EST, LEVL V, 40-54 MIN: ICD-10-PCS | Mod: S$GLB,,, | Performed by: INTERNAL MEDICINE

## 2020-10-12 PROCEDURE — 3008F BODY MASS INDEX DOCD: CPT | Mod: CPTII,S$GLB,, | Performed by: INTERNAL MEDICINE

## 2020-10-12 PROCEDURE — 99215 OFFICE O/P EST HI 40 MIN: CPT | Mod: S$GLB,,, | Performed by: INTERNAL MEDICINE

## 2020-10-12 NOTE — PATIENT INSTRUCTIONS
-Labs Friday          Infliximab (Remicade): Biologics - Anti-TNF Agent    - Mechanism of action:  Remicade blocks TNF alpha which plays a role in the inflammatory process for inflammatory bowel disease  - Labs   - we have or will be checking labs to determine the safety of taking this medication including baseline blood counts, liver and kidney function tests, TB test and viral hepatitis testing    Remicade Dosage:  - Loading Dose: 5 mg/kg IV week 0, 2, 6 (infused over 2 hours)  - Maintenance Dose: 5 mg/kg mg IV every 8 weeks  - 2-3 hour infusion    Risks of Remicade:  Stop therapy due to adverse event=10% (10/100)    Please call us immediately if you develop any of the below problems    Allergic reactions  - <2% (2/100) develop infusion site reactions  - RARE- hives (rash), difficulty breathing, chest pain/tightness, high or low blood pressure, swelling of the face and hands, fever or chills    Serious Infections=3% (3/100)  fever, tiredness, flu, open sores, warm red painful skin    Blood Disorders  fever that doesn't go away, bruising, bleeding, severe paleness    Non-Hodgkins Lymphoma=0.06% (6/10,000)    Drug related lupus-like reaction=1% (1/100)  chest discomfort or pain that does not go away, shortness of breath, joint pain, rash on the cheeks or arms that gets worse in the sun    Psoriasis  new or worsening red scaly patches or raised bumps on the skin that are filled with pus     Case Reports Only: Multiple Sclerosis and Guillain Blanket Syndrome  Numbness/weakness/tingling of your hands and feet, changes in your vision or seizures    Case Reports Only: New or worsening Congestive Heart Failure  shortness of breath, swelling in your ankles or feet, sudden weight gain    Case Reports Only: Serious Liver Injury  jaundice (yellow skin or eyes), dark brown urine, right-sided abdominal pain, fever, severe itchiness    Vaccine Counseling  NO LIVE VACCINES UP TO 8 WEEKS PRIOR TO STARTING THIS MEDICATION, DURING OR  8 WEEKS AFTER STOPPING THIS MEDICATION    - Live Vaccines Include:   --Intranasal Influenza A/B  --Measles, Mumps, Rubella (MMR)  --Rotavirus  --Typhoid (oral)  --Vaccina (Smallpox)  --Varicella (Chicken Pox)  --Yellow Fever  --Zoster

## 2020-10-16 ENCOUNTER — LAB VISIT (OUTPATIENT)
Dept: LAB | Facility: HOSPITAL | Age: 35
End: 2020-10-16
Attending: INTERNAL MEDICINE
Payer: COMMERCIAL

## 2020-10-16 DIAGNOSIS — K50.113 CROHN'S DISEASE OF LARGE INTESTINE WITH FISTULA: ICD-10-CM

## 2020-10-16 LAB
ALBUMIN SERPL BCP-MCNC: 4.3 G/DL (ref 3.5–5.2)
ALP SERPL-CCNC: 44 U/L (ref 55–135)
ALT SERPL W/O P-5'-P-CCNC: 22 U/L (ref 10–44)
ANION GAP SERPL CALC-SCNC: 7 MMOL/L (ref 8–16)
AST SERPL-CCNC: 17 U/L (ref 10–40)
BASOPHILS # BLD AUTO: 0.09 K/UL (ref 0–0.2)
BASOPHILS NFR BLD: 1.2 % (ref 0–1.9)
BILIRUB SERPL-MCNC: 0.5 MG/DL (ref 0.1–1)
BUN SERPL-MCNC: 11 MG/DL (ref 6–20)
CALCIUM SERPL-MCNC: 9 MG/DL (ref 8.7–10.5)
CHLORIDE SERPL-SCNC: 105 MMOL/L (ref 95–110)
CHOLEST SERPL-MCNC: 187 MG/DL (ref 120–199)
CHOLEST/HDLC SERPL: 2.6 {RATIO} (ref 2–5)
CO2 SERPL-SCNC: 29 MMOL/L (ref 23–29)
CREAT SERPL-MCNC: 0.7 MG/DL (ref 0.5–1.4)
DIFFERENTIAL METHOD: ABNORMAL
EOSINOPHIL # BLD AUTO: 0.9 K/UL (ref 0–0.5)
EOSINOPHIL NFR BLD: 11.6 % (ref 0–8)
ERYTHROCYTE [DISTWIDTH] IN BLOOD BY AUTOMATED COUNT: 12.5 % (ref 11.5–14.5)
EST. GFR  (AFRICAN AMERICAN): >60 ML/MIN/1.73 M^2
EST. GFR  (NON AFRICAN AMERICAN): >60 ML/MIN/1.73 M^2
GLUCOSE SERPL-MCNC: 100 MG/DL (ref 70–110)
HCT VFR BLD AUTO: 43 % (ref 40–54)
HCV AB SERPL QL IA: NEGATIVE
HDLC SERPL-MCNC: 72 MG/DL (ref 40–75)
HDLC SERPL: 38.5 % (ref 20–50)
HGB BLD-MCNC: 14 G/DL (ref 14–18)
HIV 1+2 AB+HIV1 P24 AG SERPL QL IA: NEGATIVE
IMM GRANULOCYTES # BLD AUTO: 0.02 K/UL (ref 0–0.04)
IMM GRANULOCYTES NFR BLD AUTO: 0.3 % (ref 0–0.5)
LDLC SERPL CALC-MCNC: 96.2 MG/DL (ref 63–159)
LYMPHOCYTES # BLD AUTO: 2.5 K/UL (ref 1–4.8)
LYMPHOCYTES NFR BLD: 33.3 % (ref 18–48)
MCH RBC QN AUTO: 29.4 PG (ref 27–31)
MCHC RBC AUTO-ENTMCNC: 32.6 G/DL (ref 32–36)
MCV RBC AUTO: 90 FL (ref 82–98)
MONOCYTES # BLD AUTO: 0.7 K/UL (ref 0.3–1)
MONOCYTES NFR BLD: 9.4 % (ref 4–15)
NEUTROPHILS # BLD AUTO: 3.3 K/UL (ref 1.8–7.7)
NEUTROPHILS NFR BLD: 44.2 % (ref 38–73)
NONHDLC SERPL-MCNC: 115 MG/DL
NRBC BLD-RTO: 0 /100 WBC
PLATELET # BLD AUTO: 370 K/UL (ref 150–350)
PMV BLD AUTO: 10.1 FL (ref 9.2–12.9)
POTASSIUM SERPL-SCNC: 4.5 MMOL/L (ref 3.5–5.1)
PROT SERPL-MCNC: 7.8 G/DL (ref 6–8.4)
RBC # BLD AUTO: 4.76 M/UL (ref 4.6–6.2)
SODIUM SERPL-SCNC: 141 MMOL/L (ref 136–145)
TRIGL SERPL-MCNC: 94 MG/DL (ref 30–150)
WBC # BLD AUTO: 7.35 K/UL (ref 3.9–12.7)

## 2020-10-16 PROCEDURE — 86480 TB TEST CELL IMMUN MEASURE: CPT

## 2020-10-16 PROCEDURE — 80053 COMPREHEN METABOLIC PANEL: CPT

## 2020-10-16 PROCEDURE — 80145 DRUG ASSAY ADALIMUMAB: CPT

## 2020-10-16 PROCEDURE — 85025 COMPLETE CBC W/AUTO DIFF WBC: CPT

## 2020-10-16 PROCEDURE — 80061 LIPID PANEL: CPT

## 2020-10-16 PROCEDURE — 86703 HIV-1/HIV-2 1 RESULT ANTBDY: CPT

## 2020-10-16 PROCEDURE — 86803 HEPATITIS C AB TEST: CPT

## 2020-10-19 LAB
FUNGUS SPEC CULT: NORMAL
GAMMA INTERFERON BACKGROUND BLD IA-ACNC: 0.11 IU/ML
M TB IFN-G CD4+ BCKGRND COR BLD-ACNC: -0.01 IU/ML
MITOGEN IGNF BCKGRD COR BLD-ACNC: >10 IU/ML
TB GOLD PLUS: NEGATIVE
TB2 - NIL: -0.07 IU/ML

## 2020-10-22 LAB — ADALIMUMAB CONCENTRATION & ANTI-ADALIMUMAB ANTIBODY: NORMAL

## 2020-10-26 ENCOUNTER — TELEPHONE (OUTPATIENT)
Dept: PHARMACY | Facility: CLINIC | Age: 35
End: 2020-10-26

## 2020-11-10 ENCOUNTER — TELEPHONE (OUTPATIENT)
Dept: GASTROENTEROLOGY | Facility: HOSPITAL | Age: 35
End: 2020-11-10

## 2020-11-10 NOTE — TELEPHONE ENCOUNTER
Reports feeling well, no abdominal pain, 3-4BM/day, non-bloody.  - reviewed humira level (13)  - discussed given appropriate humira levels for fistulizing and mauricio-anal disease do not recommend transition of therapy at this time to remicade as we are not convinced it will help and may worsen disease. Dr. Spence discussed with Dr. Monterroso as well and not recommending surgical intervention at this time. We recommend continuing with setons in place without changes in therapy at this time and will continue to monitor and can rediscuss in future.  - has followup 11/18 with Dr. Monterroso and 4/2021 with us.  - patient in agreement  - questions answered

## 2020-11-17 NOTE — PROGRESS NOTES
HPI:  Joss Matute is a 34 y.o. male with history of Crohn's disease (ileitis, rectal ulcer)  and complex perianal fistula.    He has had four drainage procedures with seton insertion    3-2017    9-2020    Interval history:    Feels well.  Minimal d/c.  Does not wear pad.  Continent.  No pain.  Barely notices seton  2-3 formed BMs.  Occasional loose stool for which he takes imodium - twice a week.  No abd pain.  No n/v.  Just moved to [facility].      Past Medical History:   Diagnosis Date    Crohn's disease     Ex-smoker 1/24/2018    Immunosuppressed status 1/27/2020    Penile venereal warts 1/25/2018    Vitamin D deficiency 6/26/2017        Past Surgical History:   Procedure Laterality Date    ABSCESS DRAINAGE      ANAL FISTULOTOMY      COLONOSCOPY      COLONOSCOPY N/A 4/7/2017    Procedure: COLONOSCOPY;  Surgeon: Bubba Spence MD;  [facility]  Service: Endoscopy;  Laterality: N/A;    COLONOSCOPY N/A 10/10/2018    Procedure: COLONOSCOPY;  Surgeon: Bubba Spence MD;  [facility]  Service: Endoscopy;  Laterality: N/A;  schedule as 45 minute case--due Fall 2018    COLONOSCOPY N/A 3/10/2020    Procedure: COLONOSCOPY;  Surgeon: Bubba Spence MD;  [facility]  Service: Endoscopy;  Laterality: N/A;  schedule 40 minute case  March 2020        EXAMINATION UNDER ANESTHESIA N/A 12/30/2019    Procedure: Exam under anesthesia- possible seton;  Surgeon: DENISE Monterroso MD;  [facility];  Service: Colon and Rectal;  Laterality: N/A;    EXAMINATION UNDER ANESTHESIA N/A 9/18/2020    Procedure: Exam under anesthesia possible seton;  Surgeon: DENISE Monterroso MD;  Location: [facility] OR 2ND FLR;  Service: Colon and Rectal;  Laterality: N/A;    INCISION AND DRAINAGE OF ABSCESS  12/30/2019    Procedure: INCISION AND DRAINAGE, ABSCESS;  Surgeon: DENISE Monterroso MD;  [facility];  Service: Colon and Rectal;;     INCISION AND DRAINAGE OF ABSCESS  2020    Procedure: INCISION AND DRAINAGE, ABSCESS;  Surgeon: DENISE Monterroso MD;  Location: [facility] OR 2ND FLR;  Service: Colon and Rectal;;    INCISION OF PERIRECTAL ABSCESS  2019    Procedure: INCISION, ABSCESS, PERIRECTAL;  Surgeon: DENISE Monterroso MD;  Location: [facility] OR 2ND FLR;  Service: Colon and Rectal;;    INSERTION OF SETON STITCH N/A 2019    Procedure: PLACEMENT, SETON STITCH;  Surgeon: DENISE Monterroso MD;  Location: [facility] OR 2ND FLR;  Service: Colon and Rectal;  Laterality: N/A;    INSERTION OF SETON STITCH  2020    Procedure: PLACEMENT, SETON STITCH;  Surgeon: DENISE Monterroso MD;  Location: [facility] OR 2ND FLR;  Service: Colon and Rectal;;    UPPER GASTROINTESTINAL ENDOSCOPY         Review of patient's allergies indicates:  No Known Allergies    Family History   Problem Relation Age of Onset    No Known Problems Mother     No Known Problems Father     No Known Problems Sister     No Known Problems Brother     ALS Paternal Grandfather     No Known Problems Sister     No Known Problems Brother        Social History     Socioeconomic History    Marital status:      Spouse name: Not on file    Number of children: Not on file    Years of education: Not on file    Highest education level: Not on file   Occupational History    Not on file   Social Needs    Financial resource strain: Not on file    Food insecurity     Worry: Not on file     Inability: Not on file    Transportation needs     Medical: Not on file     Non-medical: Not on file   Tobacco Use    Smoking status: Former Smoker     Types: Cigarettes     Quit date: 10/24/2015     Years since quittin.0    Smokeless tobacco: Never Used   Substance and Sexual Activity    Alcohol use: Yes     Comment: 12//week    Drug use: No    Sexual activity: Yes     Partners: Female     Comment:     Lifestyle    Physical activity     Days per week: Not on file     Minutes per  session: Not on file    Stress: Not on file   Relationships    Social connections     Talks on phone: Not on file     Gets together: Not on file     Attends [employer]ism service: Not on file     Active member of club or organization: Not on file     Attends meetings of clubs or organizations: Not on file     Relationship status: Not on file   Other Topics Concern    Not on file   Social History Narrative     at [employer]        ROS:  GENERAL: No fever, chills, fatigability or weight loss.  Integument: No rashes, redness, icterus  CHEST: Denies DAY, cyanosis, wheezing, cough and sputum production.  CARDIOVASCULAR: Denies chest pain, PND, orthopnea or reduced exercise tolerance.  GI: Denies abd pain, dysphagia, nausea, vomiting, no hematemesis   : Denies burning on urination, no hematuria, no bacteriuria  MSK: No deformities, swelling, joint pain swelling  Neurologic: No HAs, seizures, weakness, paresthesias, gait problems    PE:  General appearance well  There were no vitals taken for this visit.  Sclera/ Skin anicteric  AT NC EOMI  Neck supple trachea midline   Chest symmetric, nl excursion, no retractions, breathing comfortably  EXT - no CCE  Neuro:  Mood/ affect nl, alert and oriented x 3, moves all ext's, gait nl    Rectal  Inspection  TODAY      No tenderness or fluctuance.      Compared to office visit 10-7-2020 wounds healing nicely.            Assessment:  Wounds healing well  No signs of undrained anal abscess  Fistula well drained  Good control of intestinal disease    Plan:  Continue seton  Soak BID  Continue Humira per Dr Spence  OV 6 months.

## 2020-11-18 ENCOUNTER — OFFICE VISIT (OUTPATIENT)
Dept: SURGERY | Facility: CLINIC | Age: 35
End: 2020-11-18
Payer: COMMERCIAL

## 2020-11-18 VITALS
HEIGHT: 73 IN | BODY MASS INDEX: 26.46 KG/M2 | DIASTOLIC BLOOD PRESSURE: 78 MMHG | SYSTOLIC BLOOD PRESSURE: 108 MMHG | WEIGHT: 199.63 LBS

## 2020-11-18 DIAGNOSIS — K61.2 ABSCESS OF ANAL AND RECTAL REGIONS: Primary | ICD-10-CM

## 2020-11-18 DIAGNOSIS — Z48.89 ENCOUNTER FOR POST SURGICAL WOUND CHECK: ICD-10-CM

## 2020-11-18 PROCEDURE — 3008F BODY MASS INDEX DOCD: CPT | Mod: CPTII,S$GLB,, | Performed by: COLON & RECTAL SURGERY

## 2020-11-18 PROCEDURE — 3008F PR BODY MASS INDEX (BMI) DOCUMENTED: ICD-10-PCS | Mod: CPTII,S$GLB,, | Performed by: COLON & RECTAL SURGERY

## 2020-11-18 PROCEDURE — 1126F PR PAIN SEVERITY QUANTIFIED, NO PAIN PRESENT: ICD-10-PCS | Mod: S$GLB,,, | Performed by: COLON & RECTAL SURGERY

## 2020-11-18 PROCEDURE — 99024 POSTOP FOLLOW-UP VISIT: CPT | Mod: S$GLB,,, | Performed by: COLON & RECTAL SURGERY

## 2020-11-18 PROCEDURE — 99999 PR PBB SHADOW E&M-EST. PATIENT-LVL III: ICD-10-PCS | Mod: PBBFAC,,, | Performed by: COLON & RECTAL SURGERY

## 2020-11-18 PROCEDURE — 99024 PR POST-OP FOLLOW-UP VISIT: ICD-10-PCS | Mod: S$GLB,,, | Performed by: COLON & RECTAL SURGERY

## 2020-11-18 PROCEDURE — 1126F AMNT PAIN NOTED NONE PRSNT: CPT | Mod: S$GLB,,, | Performed by: COLON & RECTAL SURGERY

## 2020-11-18 PROCEDURE — 99999 PR PBB SHADOW E&M-EST. PATIENT-LVL III: CPT | Mod: PBBFAC,,, | Performed by: COLON & RECTAL SURGERY

## 2020-11-21 DIAGNOSIS — K50.813 CROHN'S DISEASE OF BOTH SMALL AND LARGE INTESTINE WITH FISTULA: ICD-10-CM

## 2020-11-23 ENCOUNTER — SPECIALTY PHARMACY (OUTPATIENT)
Dept: PHARMACY | Facility: CLINIC | Age: 35
End: 2020-11-23

## 2020-11-23 NOTE — TELEPHONE ENCOUNTER
Refill request for Humira     Allergies reviewed  Yes     Drug Monitoring labs:  10/16/2020    Lab Results   Component Value Date    HEPBSAG Negative 07/13/2020    HEPBCAB Negative 07/13/2020     Lab Results   Component Value Date    TBGOLDPLUS Negative 10/16/2020     Lab Results   Component Value Date    CYIFNIST98KS 26 (L) 07/13/2020    PTYLNXFG86 346 07/13/2020     Lab Results   Component Value Date    WBC 7.35 10/16/2020    HGB 14.0 10/16/2020    HCT 43.0 10/16/2020    MCV 90 10/16/2020     (H) 10/16/2020     Lab Results   Component Value Date    CREATININE 0.7 10/16/2020    ALBUMIN 4.3 10/16/2020    BILITOT 0.5 10/16/2020    ALKPHOS 44 (L) 10/16/2020    AST 17 10/16/2020    ALT 22 10/16/2020       Labs due:  At Follow up April 2021     Next appt: 04/05/20201

## 2020-11-24 NOTE — TELEPHONE ENCOUNTER
Specialty Pharmacy - Refill Coordination    Specialty Medication Orders Linked to Encounter      Most Recent Value   Medication #1  adalimumab (HUMIRA) PnKt injection (Order#[id number], Rx#[id number])          Refill Questions - Documented Responses      Most Recent Value   Relationship to patient of person spoken to?  Self   HIPAA/medical authority confirmed?  Yes   Any changes in contact preferences or allowed representatives?  No   Has the patient had any insurance changes?  No   Has the patient had any changes to specialty medication, dose, or instructions?  No   Has the patient started taking any new medications, herbals, or supplements?  No   Has the patient been diagnosed with any new medical conditions?  No   Does the patient have any new allergies to medications or foods?  No   Can the patient store medication/sharps container properly (at the correct temperature, away from children/pets, etc.)?  Yes   Can the patient call emergency services (911) in the event of an emergency?  Yes   Does the patient have any concerns or questions about taking or administering this medication as prescribed?  No   How many doses did the patient miss in the past 4 weeks or since the last fill?  0   How many doses does the patient have on hand?  0   How many days does the patient report on hand quantity will last?  0   Does the number of doses/days supply remaining match pharmacy expected amounts?  Yes   Does the patient feel that this medication is effective?  Yes   During the past 4 weeks, has patient missed any activities due to condition or medication?  No   During the past 4 weeks, did patient have any of the following urgent care visits?  None   How will the patient receive the medication?  Mail   When does the patient need to receive the medication?  11/25/20   Shipping Address  Home   Expected Copay ($)  0   Is the patient able to afford the medication copay?  Yes   Payment Method  zero copay   Days supply of Refill   28   Would patient like to speak to a pharmacist?  No   Do you want to trigger an intervention?  No   Do you want to trigger an additional referral task?  No   Refill activity completed?  Yes   Refill activity plan  Refill scheduled   Shipment/Pickup Date:  11/24/20          Current Outpatient Medications   Medication Sig    adalimumab (HUMIRA) PnKt injection Inject 0.8 mLs (40 mg total) into the skin every 7 days.    cyanocobalamin (VITAMIN B-12) 1000 MCG tablet Take 100 mcg by mouth once daily.    vitamin D (VITAMIN D3) 1000 units Tab Take 2,000 Units by mouth once daily.   Last reviewed on 11/23/2020  6:14 PM by Blue Cooper    Review of patient's allergies indicates:  No Known Allergies Last reviewed on  11/23/2020 8:15 AM by Norma Lunsford      Tasks added this encounter   12/16/2020 - Refill Call (Auto Added)   Tasks due within next 3 months   12/2/2020 - Clinical - Follow Up Assesement (90 day)     Blue Cooper  [facility]  [address] [place] [id number]  Phone: [phone]  Fax: [phone]

## 2020-12-08 ENCOUNTER — SPECIALTY PHARMACY (OUTPATIENT)
Dept: PHARMACY | Facility: CLINIC | Age: 35
End: 2020-12-08

## 2020-12-08 DIAGNOSIS — K50.113 CROHN'S DISEASE OF LARGE INTESTINE WITH FISTULA: Primary | ICD-10-CM

## 2020-12-08 NOTE — TELEPHONE ENCOUNTER
Specialty Pharmacy - Clinical Reassessment    Specialty Medication Orders Linked to Encounter      Most Recent Value   Medication #1  adalimumab (HUMIRA) PnKt injection (Order#[id number], Rx#[id number])        Subjective    Joss Matute is a 35 y.o. male, who is followed by the specialty pharmacy service for management and education.    Recent Encounters     Date Type Provider Description    12/08/2020 Specialty Pharmacy Kaley Ramirez PharmD Follow-up Clinical Reassessment    11/23/2020 Specialty Pharmacy Blue Tucker Refill Coordination        Clinical call attempts since last clinical assessment   No call attempts found.     Today he received follow up education for his specialty medication(s).    Current Outpatient Medications   Medication Sig    adalimumab (HUMIRA) PnKt injection Inject 0.8 mLs (40 mg total) into the skin every 7 days.    cyanocobalamin (VITAMIN B-12) 1000 MCG tablet Take 100 mcg by mouth once daily.    vitamin D (VITAMIN D3) 1000 units Tab Take 2,000 Units by mouth once daily.   Last reviewed on 11/23/2020  6:14 PM by Blue Tucker    Review of patient's allergies indicates:  No Known AllergiesLast reviewed on  11/23/2020 8:15 AM by Norma Lunsford      Medication Adherence    What concerns does the patient have in regards to their medications: Patient had no questions or concerns regarding Humira. Patient feels comfortable adminitering medication and reports no missed doses at this time.   Patient reported X missed doses in the last month: 0  Any gaps in refill history greater than 2 weeks in the last 3 months: no  Informant: patient  Reliability of informant: reliable  Provider-estimated medication adherence level: good  Reasons for non-adherence: no problems identified  Adherence tools used: calendar  Confirmed plan for next specialty medication refill: not applicable  Refills needed for supportive medications: not needed       Adverse Effects    *All other systems reviewed and  "are negative           Objective    He has a past medical history of Crohn's disease, Ex-smoker (1/24/2018), Immunosuppressed status (1/27/2020), Penile venereal warts (1/25/2018), and Vitamin D deficiency (6/26/2017).    Tried/failed medications: Methotrexate, Humria every 14 days (in 2018 Humira was increased to every 7 days)    BP Readings from Last 4 Encounters:   11/18/20 108/78   10/12/20 135/85   10/07/20 112/75   09/18/20 136/76     Ht Readings from Last 4 Encounters:   11/18/20 6' 1" (1.854 m)   10/12/20 6' 1" (1.854 m)   10/07/20 6' 1" (1.854 m)   09/18/20 6' 1" (1.854 m)     Wt Readings from Last 4 Encounters:   11/18/20 90.5 kg (199 lb 9.6 oz)   10/12/20 88.1 kg (194 lb 3.6 oz)   10/07/20 87.7 kg (193 lb 5.5 oz)   09/18/20 88.5 kg (195 lb)     Recent Labs   Lab Result Units 10/16/20  1100   Creatinine mg/dL 0.7   ALT U/L 22   AST U/L 17     The goals of prescribed drug therapy management include:  · Supporting patient to meet the prescriber's medical treatment objectives  · Improving or maintaining quality of life  · Maintaining optimal therapy adherence  · Minimizing and managing side effects           Assessment/Plan  Patient plans to continue therapy without changes      Indication, dosage, appropriateness, effectiveness, safety and convenience of his specialty medication(s) were reviewed today.     Patient Counseling    Counseled the patient on the following: doses and administration discussed, safe handling, storage, and disposal discussed, possible adverse effects and management discussed, lab monitoring and follow-up discussed, therapeutic rationale discussed, cost of medications and cost implications discussed, adherence and missed doses discussed, pharmacy contact information discussed       Patient reports he is doing well on Humira and feels well controlled. Patient has been on Humria for over 2 years. Reports his last flare was a few months ago but he and provider felt the best course of " action was to continue Humira and patient has not flared since, he is also unsure of what caused the flare. Regarding medication administration patient injects Humira himself and confirmed rotating injection site. Patient reports no missed doses and no barriers to adherence identified. Patient denies any side effects but reports he kurtz shave some itching a redness at the injection site on occasion that resolves on it's on after a few hours. Patient advised if itching persists to use hydrocortisone as needed at injection site. Patient had no questions or concerns. Advised to call [facility] if any arise.     Tasks added this encounter   No tasks added.   Tasks due within next 3 months   12/2/2020 - Clinical - Follow Up Assesement (90 day)  12/16/2020 - Refill Call (Auto Added)     Kaley Ramirez PharmD  [facility]  [address] [place] [id number]  Phone: [phone]  Fax: [phone]

## 2020-12-18 ENCOUNTER — PATIENT MESSAGE (OUTPATIENT)
Dept: PHARMACY | Facility: CLINIC | Age: 35
End: 2020-12-18

## 2020-12-22 ENCOUNTER — SPECIALTY PHARMACY (OUTPATIENT)
Dept: PHARMACY | Facility: CLINIC | Age: 35
End: 2020-12-22

## 2020-12-22 NOTE — TELEPHONE ENCOUNTER
Specialty Pharmacy - Refill Coordination    Specialty Medication Orders Linked to Encounter      Most Recent Value   Medication #1  adalimumab (HUMIRA) PnKt injection (Order#[id number], Rx#[id number])          Refill Questions - Documented Responses      Most Recent Value   Relationship to patient of person spoken to?  Self   HIPAA/medical authority confirmed?  Yes   Any changes in contact preferences or allowed representatives?  No   Has the patient had any insurance changes?  No   Has the patient had any changes to specialty medication, dose, or instructions?  No   Has the patient started taking any new medications, herbals, or supplements?  No   Has the patient been diagnosed with any new medical conditions?  No   Does the patient have any new allergies to medications or foods?  No   Does the patient have any concerns about side effects?  No   Can the patient store medication/sharps container properly (at the correct temperature, away from children/pets, etc.)?  Yes   Can the patient call emergency services (111) in the event of an emergency?  Yes   Does the patient have any concerns or questions about taking or administering this medication as prescribed?  No   How many doses did the patient miss in the past 4 weeks or since the last fill?  0   How many doses does the patient have on hand?  0   How many days does the patient report on hand quantity will last?  4   Does the number of doses/days supply remaining match pharmacy expected amounts?  Yes   How will the patient receive the medication?  Mail   When does the patient need to receive the medication?  12/26/20   Shipping Address  Home   Address in [facility] confirmed and updated if neccessary?  Yes   Expected Copay ($)  0   Is the patient able to afford the medication copay?  Yes   Payment Method  zero copay   Days supply of Refill  28   Would patient like to speak to a pharmacist?  No   Do you want to trigger an intervention?  No   Do you want to  trigger an additional referral task?  No   Refill activity completed?  Yes   Refill activity plan  Refill scheduled   Shipment/Pickup Date:  12/22/20          Current Outpatient Medications   Medication Sig    adalimumab (HUMIRA) PnKt injection Inject 0.8 mLs (40 mg total) into the skin every 7 days.    cyanocobalamin (VITAMIN B-12) 1000 MCG tablet Take 100 mcg by mouth once daily.    vitamin D (VITAMIN D3) 1000 units Tab Take 2,000 Units by mouth once daily.   Last reviewed on 11/23/2020  6:14 PM by Blue Tucker    Review of patient's allergies indicates:  No Known Allergies Last reviewed on  11/23/2020 8:15 AM by Norma Lunsford      Tasks added this encounter   1/14/2021 - Refill Call (Auto Added)   Tasks due within next 3 months   No tasks due.     [facility]  [address] [place] [id number]  Phone: [phone]  Fax: [phone]

## 2021-01-20 ENCOUNTER — SPECIALTY PHARMACY (OUTPATIENT)
Dept: PHARMACY | Facility: CLINIC | Age: 36
End: 2021-01-20

## 2021-02-15 ENCOUNTER — PATIENT MESSAGE (OUTPATIENT)
Dept: PHARMACY | Facility: CLINIC | Age: 36
End: 2021-02-15

## 2021-02-15 ENCOUNTER — SPECIALTY PHARMACY (OUTPATIENT)
Dept: PHARMACY | Facility: CLINIC | Age: 36
End: 2021-02-15

## 2021-02-22 ENCOUNTER — TELEPHONE (OUTPATIENT)
Dept: GASTROENTEROLOGY | Facility: CLINIC | Age: 36
End: 2021-02-22

## 2021-02-22 DIAGNOSIS — K50.813 CROHN'S DISEASE OF BOTH SMALL AND LARGE INTESTINE WITH FISTULA: ICD-10-CM

## 2021-02-25 DIAGNOSIS — K50.813 CROHN'S DISEASE OF BOTH SMALL AND LARGE INTESTINE WITH FISTULA: ICD-10-CM

## 2021-03-03 ENCOUNTER — TELEPHONE (OUTPATIENT)
Dept: GASTROENTEROLOGY | Facility: CLINIC | Age: 36
End: 2021-03-03

## 2021-04-05 ENCOUNTER — OFFICE VISIT (OUTPATIENT)
Dept: GASTROENTEROLOGY | Facility: CLINIC | Age: 36
End: 2021-04-05
Payer: COMMERCIAL

## 2021-04-05 VITALS
BODY MASS INDEX: 27.85 KG/M2 | HEIGHT: 73 IN | SYSTOLIC BLOOD PRESSURE: 125 MMHG | HEART RATE: 90 BPM | OXYGEN SATURATION: 100 % | DIASTOLIC BLOOD PRESSURE: 83 MMHG | RESPIRATION RATE: 16 BRPM | TEMPERATURE: 99 F | WEIGHT: 210.13 LBS

## 2021-04-05 DIAGNOSIS — D84.9 IMMUNOSUPPRESSED STATUS: ICD-10-CM

## 2021-04-05 DIAGNOSIS — Z87.891 FORMER SMOKER: ICD-10-CM

## 2021-04-05 DIAGNOSIS — K50.113 CROHN'S DISEASE OF LARGE INTESTINE WITH FISTULA: Primary | ICD-10-CM

## 2021-04-05 DIAGNOSIS — K61.1 PERIRECTAL ABSCESS: ICD-10-CM

## 2021-04-05 PROCEDURE — 99214 OFFICE O/P EST MOD 30 MIN: CPT | Mod: S$GLB,,, | Performed by: INTERNAL MEDICINE

## 2021-04-05 PROCEDURE — 99214 PR OFFICE/OUTPT VISIT, EST, LEVL IV, 30-39 MIN: ICD-10-PCS | Mod: S$GLB,,, | Performed by: INTERNAL MEDICINE

## 2021-04-06 PROBLEM — E55.9 VITAMIN D DEFICIENCY: Status: RESOLVED | Noted: 2017-06-26 | Resolved: 2021-04-06

## 2021-04-07 LAB
ALBUMIN SERPL-MCNC: 4.5 G/DL (ref 4–5)
ALBUMIN/GLOB SERPL: 1.5 {RATIO} (ref 1.2–2.2)
ALP SERPL-CCNC: 36 IU/L (ref 39–117)
ALT SERPL-CCNC: 27 IU/L (ref 0–44)
AST SERPL-CCNC: 15 IU/L (ref 0–40)
BILIRUB SERPL-MCNC: 0.6 MG/DL (ref 0–1.2)
BUN SERPL-MCNC: 11 MG/DL (ref 6–20)
BUN/CREAT SERPL: 14 (ref 9–20)
CALCIUM SERPL-MCNC: 9.2 MG/DL (ref 8.7–10.2)
CHLORIDE SERPL-SCNC: 102 MMOL/L (ref 96–106)
CO2 SERPL-SCNC: 24 MMOL/L (ref 20–29)
CREAT SERPL-MCNC: 0.76 MG/DL (ref 0.76–1.27)
GLOBULIN SER CALC-MCNC: 3 G/DL (ref 1.5–4.5)
GLUCOSE SERPL-MCNC: 106 MG/DL (ref 65–99)
POTASSIUM SERPL-SCNC: 4.3 MMOL/L (ref 3.5–5.2)
PROT SERPL-MCNC: 7.5 G/DL (ref 6–8.5)
SODIUM SERPL-SCNC: 141 MMOL/L (ref 134–144)

## 2021-04-19 DIAGNOSIS — K50.813 CROHN'S DISEASE OF BOTH SMALL AND LARGE INTESTINE WITH FISTULA: ICD-10-CM

## 2021-04-20 ENCOUNTER — TELEPHONE (OUTPATIENT)
Dept: GASTROENTEROLOGY | Facility: CLINIC | Age: 36
End: 2021-04-20

## 2021-04-29 ENCOUNTER — SPECIALTY PHARMACY (OUTPATIENT)
Dept: PHARMACY | Facility: CLINIC | Age: 36
End: 2021-04-29

## 2021-04-29 ENCOUNTER — PATIENT MESSAGE (OUTPATIENT)
Dept: GASTROENTEROLOGY | Facility: CLINIC | Age: 36
End: 2021-04-29

## 2021-04-29 DIAGNOSIS — K50.813 CROHN'S DISEASE OF BOTH SMALL AND LARGE INTESTINE WITH FISTULA: ICD-10-CM

## 2021-04-30 LAB
BASOPHILS # BLD AUTO: 0.1 X10E3/UL (ref 0–0.2)
BASOPHILS NFR BLD AUTO: 1 %
EOSINOPHIL # BLD AUTO: 0.7 X10E3/UL (ref 0–0.4)
EOSINOPHIL NFR BLD AUTO: 10 %
ERYTHROCYTE [DISTWIDTH] IN BLOOD BY AUTOMATED COUNT: 12.4 % (ref 11.6–15.4)
HBV CORE AB SERPL QL IA: NEGATIVE
HBV SURFACE AG SERPL QL IA: NEGATIVE
HCT VFR BLD AUTO: 42.5 % (ref 37.5–51)
HGB BLD-MCNC: 14 G/DL (ref 13–17.7)
IMM GRANULOCYTES # BLD AUTO: 0 X10E3/UL (ref 0–0.1)
IMM GRANULOCYTES NFR BLD AUTO: 0 %
LYMPHOCYTES # BLD AUTO: 2.9 X10E3/UL (ref 0.7–3.1)
LYMPHOCYTES NFR BLD AUTO: 41 %
MCH RBC QN AUTO: 29.5 PG (ref 26.6–33)
MCHC RBC AUTO-ENTMCNC: 32.9 G/DL (ref 31.5–35.7)
MCV RBC AUTO: 90 FL (ref 79–97)
MONOCYTES # BLD AUTO: 0.6 X10E3/UL (ref 0.1–0.9)
MONOCYTES NFR BLD AUTO: 8 %
NEUTROPHILS # BLD AUTO: 2.8 X10E3/UL (ref 1.4–7)
NEUTROPHILS NFR BLD AUTO: 40 %
PLATELET # BLD AUTO: 347 X10E3/UL (ref 150–450)
RBC # BLD AUTO: 4.75 X10E6/UL (ref 4.14–5.8)
WBC # BLD AUTO: 7.1 X10E3/UL (ref 3.4–10.8)

## 2021-07-15 DIAGNOSIS — K50.813 CROHN'S DISEASE OF BOTH SMALL AND LARGE INTESTINE WITH FISTULA: ICD-10-CM

## 2021-07-15 RX ORDER — ADALIMUMAB 40MG/0.8ML
KIT SUBCUTANEOUS
Qty: 12 PEN | Refills: 1 | Status: SHIPPED | OUTPATIENT
Start: 2021-07-15 | End: 2021-07-29

## 2021-07-28 DIAGNOSIS — K50.813 CROHN'S DISEASE OF BOTH SMALL AND LARGE INTESTINE WITH FISTULA: ICD-10-CM

## 2021-07-29 RX ORDER — ADALIMUMAB 40MG/0.8ML
KIT SUBCUTANEOUS
Qty: 12 PEN | Refills: 0 | Status: SHIPPED | OUTPATIENT
Start: 2021-07-29 | End: 2022-03-24 | Stop reason: SDUPTHER

## 2021-09-06 ENCOUNTER — TELEPHONE (OUTPATIENT)
Dept: SURGERY | Facility: CLINIC | Age: 36
End: 2021-09-06

## 2021-09-22 ENCOUNTER — OFFICE VISIT (OUTPATIENT)
Dept: SURGERY | Facility: CLINIC | Age: 36
End: 2021-09-22
Payer: COMMERCIAL

## 2021-09-22 VITALS
BODY MASS INDEX: 29.96 KG/M2 | WEIGHT: 226.06 LBS | HEIGHT: 73 IN | HEART RATE: 89 BPM | SYSTOLIC BLOOD PRESSURE: 152 MMHG | DIASTOLIC BLOOD PRESSURE: 91 MMHG

## 2021-09-22 DIAGNOSIS — K60.3 TRANSSPHINCTERIC ANAL FISTULA: Primary | ICD-10-CM

## 2021-09-22 DIAGNOSIS — K50.113 CROHN'S DISEASE OF LARGE INTESTINE WITH FISTULA: ICD-10-CM

## 2021-09-22 DIAGNOSIS — K61.2 ABSCESS OF ANAL AND RECTAL REGIONS: ICD-10-CM

## 2021-09-22 PROCEDURE — 1159F PR MEDICATION LIST DOCUMENTED IN MEDICAL RECORD: ICD-10-PCS | Mod: CPTII,S$GLB,, | Performed by: COLON & RECTAL SURGERY

## 2021-09-22 PROCEDURE — 99213 PR OFFICE/OUTPT VISIT, EST, LEVL III, 20-29 MIN: ICD-10-PCS | Mod: S$GLB,,, | Performed by: COLON & RECTAL SURGERY

## 2021-09-22 PROCEDURE — 3080F PR MOST RECENT DIASTOLIC BLOOD PRESSURE >= 90 MM HG: ICD-10-PCS | Mod: CPTII,S$GLB,, | Performed by: COLON & RECTAL SURGERY

## 2021-09-22 PROCEDURE — 99213 OFFICE O/P EST LOW 20 MIN: CPT | Mod: S$GLB,,, | Performed by: COLON & RECTAL SURGERY

## 2021-09-22 PROCEDURE — 3008F PR BODY MASS INDEX (BMI) DOCUMENTED: ICD-10-PCS | Mod: CPTII,S$GLB,, | Performed by: COLON & RECTAL SURGERY

## 2021-09-22 PROCEDURE — 99999 PR PBB SHADOW E&M-EST. PATIENT-LVL III: ICD-10-PCS | Mod: PBBFAC,,, | Performed by: COLON & RECTAL SURGERY

## 2021-09-22 PROCEDURE — 3008F BODY MASS INDEX DOCD: CPT | Mod: CPTII,S$GLB,, | Performed by: COLON & RECTAL SURGERY

## 2021-09-22 PROCEDURE — 1159F MED LIST DOCD IN RCRD: CPT | Mod: CPTII,S$GLB,, | Performed by: COLON & RECTAL SURGERY

## 2021-09-22 PROCEDURE — 3080F DIAST BP >= 90 MM HG: CPT | Mod: CPTII,S$GLB,, | Performed by: COLON & RECTAL SURGERY

## 2021-09-22 PROCEDURE — 3077F PR MOST RECENT SYSTOLIC BLOOD PRESSURE >= 140 MM HG: ICD-10-PCS | Mod: CPTII,S$GLB,, | Performed by: COLON & RECTAL SURGERY

## 2021-09-22 PROCEDURE — 3077F SYST BP >= 140 MM HG: CPT | Mod: CPTII,S$GLB,, | Performed by: COLON & RECTAL SURGERY

## 2021-09-22 PROCEDURE — 99999 PR PBB SHADOW E&M-EST. PATIENT-LVL III: CPT | Mod: PBBFAC,,, | Performed by: COLON & RECTAL SURGERY

## 2021-09-29 ENCOUNTER — PATIENT MESSAGE (OUTPATIENT)
Dept: GASTROENTEROLOGY | Facility: CLINIC | Age: 36
End: 2021-09-29

## 2021-09-30 ENCOUNTER — PATIENT MESSAGE (OUTPATIENT)
Dept: GASTROENTEROLOGY | Facility: CLINIC | Age: 36
End: 2021-09-30

## 2021-09-30 ENCOUNTER — TELEPHONE (OUTPATIENT)
Dept: GASTROENTEROLOGY | Facility: CLINIC | Age: 36
End: 2021-09-30

## 2021-10-04 ENCOUNTER — OFFICE VISIT (OUTPATIENT)
Dept: GASTROENTEROLOGY | Facility: CLINIC | Age: 36
End: 2021-10-04
Payer: COMMERCIAL

## 2021-10-04 ENCOUNTER — TELEPHONE (OUTPATIENT)
Dept: ENDOSCOPY | Facility: HOSPITAL | Age: 36
End: 2021-10-04

## 2021-10-04 DIAGNOSIS — Z01.818 PRE-OP TESTING: Primary | ICD-10-CM

## 2021-10-04 DIAGNOSIS — Z12.11 SPECIAL SCREENING FOR MALIGNANT NEOPLASMS, COLON: Primary | ICD-10-CM

## 2021-10-04 DIAGNOSIS — K50.113 CROHN'S DISEASE OF LARGE INTESTINE WITH FISTULA: Primary | ICD-10-CM

## 2021-10-04 DIAGNOSIS — D84.9 IMMUNOSUPPRESSED STATUS: ICD-10-CM

## 2021-10-04 PROCEDURE — 99214 PR OFFICE/OUTPT VISIT, EST, LEVL IV, 30-39 MIN: ICD-10-PCS | Mod: 95,,, | Performed by: INTERNAL MEDICINE

## 2021-10-04 PROCEDURE — 99214 OFFICE O/P EST MOD 30 MIN: CPT | Mod: 95,,, | Performed by: INTERNAL MEDICINE

## 2021-10-04 PROCEDURE — 1160F PR REVIEW ALL MEDS BY PRESCRIBER/CLIN PHARMACIST DOCUMENTED: ICD-10-PCS | Mod: CPTII,95,, | Performed by: INTERNAL MEDICINE

## 2021-10-04 PROCEDURE — 1159F PR MEDICATION LIST DOCUMENTED IN MEDICAL RECORD: ICD-10-PCS | Mod: CPTII,95,, | Performed by: INTERNAL MEDICINE

## 2021-10-04 PROCEDURE — 1160F RVW MEDS BY RX/DR IN RCRD: CPT | Mod: CPTII,95,, | Performed by: INTERNAL MEDICINE

## 2021-10-04 PROCEDURE — 1159F MED LIST DOCD IN RCRD: CPT | Mod: CPTII,95,, | Performed by: INTERNAL MEDICINE

## 2021-10-04 RX ORDER — LOPERAMIDE HYDROCHLORIDE 2 MG/1
2 CAPSULE ORAL DAILY
COMMUNITY

## 2021-10-04 RX ORDER — SODIUM, POTASSIUM,MAG SULFATES 17.5-3.13G
SOLUTION, RECONSTITUTED, ORAL ORAL
Qty: 1 BOTTLE | Refills: 0 | Status: SHIPPED | OUTPATIENT
Start: 2021-10-04 | End: 2021-10-08 | Stop reason: SDUPTHER

## 2021-10-05 ENCOUNTER — TELEPHONE (OUTPATIENT)
Dept: GASTROENTEROLOGY | Facility: CLINIC | Age: 36
End: 2021-10-05

## 2021-10-08 ENCOUNTER — TELEPHONE (OUTPATIENT)
Dept: ENDOSCOPY | Facility: HOSPITAL | Age: 36
End: 2021-10-08

## 2021-10-11 ENCOUNTER — TELEPHONE (OUTPATIENT)
Dept: GASTROENTEROLOGY | Facility: CLINIC | Age: 36
End: 2021-10-11

## 2021-10-11 DIAGNOSIS — Z12.11 SPECIAL SCREENING FOR MALIGNANT NEOPLASMS, COLON: Primary | ICD-10-CM

## 2021-10-11 RX ORDER — SODIUM, POTASSIUM,MAG SULFATES 17.5-3.13G
1 SOLUTION, RECONSTITUTED, ORAL ORAL ONCE
Qty: 1 KIT | Refills: 0 | Status: SHIPPED | OUTPATIENT
Start: 2021-10-11 | End: 2021-10-11

## 2021-10-12 ENCOUNTER — ANESTHESIA EVENT (OUTPATIENT)
Dept: ENDOSCOPY | Facility: HOSPITAL | Age: 36
End: 2021-10-12
Payer: COMMERCIAL

## 2021-10-12 ENCOUNTER — HOSPITAL ENCOUNTER (OUTPATIENT)
Facility: HOSPITAL | Age: 36
Discharge: HOME | End: 2021-10-12
Attending: INTERNAL MEDICINE | Admitting: INTERNAL MEDICINE
Payer: COMMERCIAL

## 2021-10-12 ENCOUNTER — CLINICAL SUPPORT (OUTPATIENT)
Dept: INFECTIOUS DISEASES | Facility: CLINIC | Age: 36
End: 2021-10-12
Payer: COMMERCIAL

## 2021-10-12 ENCOUNTER — ANESTHESIA (OUTPATIENT)
Dept: ENDOSCOPY | Facility: HOSPITAL | Age: 36
End: 2021-10-12
Payer: COMMERCIAL

## 2021-10-12 VITALS
WEIGHT: 224 LBS | HEART RATE: 90 BPM | RESPIRATION RATE: 18 BRPM | TEMPERATURE: 98 F | OXYGEN SATURATION: 98 % | DIASTOLIC BLOOD PRESSURE: 79 MMHG | SYSTOLIC BLOOD PRESSURE: 139 MMHG | BODY MASS INDEX: 29.69 KG/M2 | HEIGHT: 73 IN

## 2021-10-12 DIAGNOSIS — K50.113 CROHN'S DISEASE OF LARGE INTESTINE WITH FISTULA: Primary | ICD-10-CM

## 2021-10-12 DIAGNOSIS — K50.113 CROHN'S DISEASE OF LARGE INTESTINE WITH FISTULA: ICD-10-CM

## 2021-10-12 DIAGNOSIS — D84.9 IMMUNOSUPPRESSED STATUS: ICD-10-CM

## 2021-10-12 PROBLEM — K50.90 CROHN'S DISEASE: Status: ACTIVE | Noted: 2021-10-12

## 2021-10-12 LAB — SARS-COV-2 RDRP RESP QL NAA+PROBE: NEGATIVE

## 2021-10-12 PROCEDURE — 27201012 HC FORCEPS, HOT/COLD, DISP: Performed by: INTERNAL MEDICINE

## 2021-10-12 PROCEDURE — E9220 PRA ENDO ANESTHESIA: HCPCS | Mod: ,,, | Performed by: NURSE ANESTHETIST, CERTIFIED REGISTERED

## 2021-10-12 PROCEDURE — 90471 ZOSTER RECOMBINANT VACCINE: ICD-10-PCS | Mod: S$GLB,,, | Performed by: INTERNAL MEDICINE

## 2021-10-12 PROCEDURE — 88305 TISSUE EXAM BY PATHOLOGIST: ICD-10-PCS | Mod: 26,,, | Performed by: PATHOLOGY

## 2021-10-12 PROCEDURE — 45380 COLONOSCOPY AND BIOPSY: CPT | Mod: ,,, | Performed by: INTERNAL MEDICINE

## 2021-10-12 PROCEDURE — 88305 TISSUE EXAM BY PATHOLOGIST: CPT | Mod: 26,,, | Performed by: PATHOLOGY

## 2021-10-12 PROCEDURE — 88305 TISSUE EXAM BY PATHOLOGIST: CPT | Performed by: PATHOLOGY

## 2021-10-12 PROCEDURE — U0002 COVID-19 LAB TEST NON-CDC: HCPCS | Performed by: INTERNAL MEDICINE

## 2021-10-12 PROCEDURE — 45380 COLONOSCOPY AND BIOPSY: CPT | Performed by: INTERNAL MEDICINE

## 2021-10-12 PROCEDURE — E9220 PRA ENDO ANESTHESIA: ICD-10-PCS | Mod: ,,, | Performed by: NURSE ANESTHETIST, CERTIFIED REGISTERED

## 2021-10-12 PROCEDURE — 25000003 PHARM REV CODE 250: Performed by: INTERNAL MEDICINE

## 2021-10-12 PROCEDURE — 90471 IMMUNIZATION ADMIN: CPT | Mod: S$GLB,,, | Performed by: INTERNAL MEDICINE

## 2021-10-12 PROCEDURE — 63600175 PHARM REV CODE 636 W HCPCS: Performed by: NURSE ANESTHETIST, CERTIFIED REGISTERED

## 2021-10-12 PROCEDURE — 37000008 HC ANESTHESIA 1ST 15 MINUTES: Performed by: INTERNAL MEDICINE

## 2021-10-12 PROCEDURE — 45380 PR COLONOSCOPY,BIOPSY: ICD-10-PCS | Mod: ,,, | Performed by: INTERNAL MEDICINE

## 2021-10-12 PROCEDURE — 90750 HZV VACC RECOMBINANT IM: CPT | Mod: S$GLB,,, | Performed by: INTERNAL MEDICINE

## 2021-10-12 PROCEDURE — 90750 ZOSTER RECOMBINANT VACCINE: ICD-10-PCS | Mod: S$GLB,,, | Performed by: INTERNAL MEDICINE

## 2021-10-12 PROCEDURE — 37000009 HC ANESTHESIA EA ADD 15 MINS: Performed by: INTERNAL MEDICINE

## 2021-10-12 RX ORDER — MIDAZOLAM HYDROCHLORIDE 1 MG/ML
INJECTION, SOLUTION INTRAMUSCULAR; INTRAVENOUS
Status: DISCONTINUED | OUTPATIENT
Start: 2021-10-12 | End: 2021-10-12

## 2021-10-12 RX ORDER — PROPOFOL 10 MG/ML
VIAL (ML) INTRAVENOUS
Status: DISCONTINUED | OUTPATIENT
Start: 2021-10-12 | End: 2021-10-12

## 2021-10-12 RX ORDER — PROPOFOL 10 MG/ML
VIAL (ML) INTRAVENOUS CONTINUOUS PRN
Status: DISCONTINUED | OUTPATIENT
Start: 2021-10-12 | End: 2021-10-12

## 2021-10-12 RX ORDER — SODIUM CHLORIDE 9 MG/ML
INJECTION, SOLUTION INTRAVENOUS CONTINUOUS
Status: DISCONTINUED | OUTPATIENT
Start: 2021-10-12 | End: 2021-10-12 | Stop reason: HOSPADM

## 2021-10-12 RX ADMIN — MIDAZOLAM HYDROCHLORIDE 2 MG: 1 INJECTION, SOLUTION INTRAMUSCULAR; INTRAVENOUS at 01:10

## 2021-10-12 RX ADMIN — PROPOFOL 60 MG: 10 INJECTION, EMULSION INTRAVENOUS at 02:10

## 2021-10-12 RX ADMIN — SODIUM CHLORIDE: 0.9 INJECTION, SOLUTION INTRAVENOUS at 01:10

## 2021-10-12 RX ADMIN — Medication 175 MCG/KG/MIN: at 02:10

## 2021-10-12 RX ADMIN — PROPOFOL 100 MG: 10 INJECTION, EMULSION INTRAVENOUS at 02:10

## 2021-10-21 LAB
FINAL PATHOLOGIC DIAGNOSIS: NORMAL
GROSS: NORMAL
Lab: NORMAL

## 2021-11-24 ENCOUNTER — OFFICE VISIT (OUTPATIENT)
Dept: SURGERY | Facility: CLINIC | Age: 36
End: 2021-11-24
Payer: COMMERCIAL

## 2021-11-24 VITALS
HEIGHT: 73 IN | BODY MASS INDEX: 30.26 KG/M2 | HEART RATE: 82 BPM | DIASTOLIC BLOOD PRESSURE: 81 MMHG | WEIGHT: 228.31 LBS | SYSTOLIC BLOOD PRESSURE: 133 MMHG

## 2021-11-24 DIAGNOSIS — K60.3 TRANSSPHINCTERIC ANAL FISTULA: Primary | ICD-10-CM

## 2021-11-24 PROCEDURE — 99213 PR OFFICE/OUTPT VISIT, EST, LEVL III, 20-29 MIN: ICD-10-PCS | Mod: S$GLB,,, | Performed by: COLON & RECTAL SURGERY

## 2021-11-24 PROCEDURE — 99999 PR PBB SHADOW E&M-EST. PATIENT-LVL III: ICD-10-PCS | Mod: PBBFAC,,, | Performed by: COLON & RECTAL SURGERY

## 2021-11-24 PROCEDURE — 99999 PR PBB SHADOW E&M-EST. PATIENT-LVL III: CPT | Mod: PBBFAC,,, | Performed by: COLON & RECTAL SURGERY

## 2021-11-24 PROCEDURE — 99213 OFFICE O/P EST LOW 20 MIN: CPT | Mod: S$GLB,,, | Performed by: COLON & RECTAL SURGERY

## 2021-12-16 ENCOUNTER — CLINICAL SUPPORT (OUTPATIENT)
Dept: INFECTIOUS DISEASES | Facility: CLINIC | Age: 36
End: 2021-12-16
Payer: COMMERCIAL

## 2021-12-16 DIAGNOSIS — K50.113 CROHN'S DISEASE OF LARGE INTESTINE WITH FISTULA: ICD-10-CM

## 2021-12-16 DIAGNOSIS — D84.9 IMMUNOSUPPRESSED STATUS: ICD-10-CM

## 2021-12-16 PROCEDURE — 90471 IMMUNIZATION ADMIN: CPT | Mod: S$GLB,,, | Performed by: INTERNAL MEDICINE

## 2021-12-16 PROCEDURE — 90750 HZV VACC RECOMBINANT IM: CPT | Mod: S$GLB,,, | Performed by: INTERNAL MEDICINE

## 2021-12-16 PROCEDURE — 90750 ZOSTER RECOMBINANT VACCINE: ICD-10-PCS | Mod: S$GLB,,, | Performed by: INTERNAL MEDICINE

## 2021-12-16 PROCEDURE — 90471 ZOSTER RECOMBINANT VACCINE: ICD-10-PCS | Mod: S$GLB,,, | Performed by: INTERNAL MEDICINE

## 2022-03-03 ENCOUNTER — TELEPHONE (OUTPATIENT)
Dept: GASTROENTEROLOGY | Facility: CLINIC | Age: 37
End: 2022-03-03
Payer: COMMERCIAL

## 2022-03-03 ENCOUNTER — PATIENT MESSAGE (OUTPATIENT)
Dept: GASTROENTEROLOGY | Facility: CLINIC | Age: 37
End: 2022-03-03
Payer: COMMERCIAL

## 2022-03-03 NOTE — TELEPHONE ENCOUNTER
----- Message from Yasmin Zambrano sent at 3/3/2022 10:16 AM CST -----  Contact: [phone]  Pt updated his insurance to [facility] and needs a prior auth sent to his new insurance. If you need to reach out to the pt call him at    [phone]

## 2022-03-03 NOTE — TELEPHONE ENCOUNTER
Received message   Pharmacy is calling to speak with the nurse in ref to the pts prior auth for [facility] can you please call pharmacy at [phone].       PA for humira was approved

## 2022-03-24 ENCOUNTER — SPECIALTY PHARMACY (OUTPATIENT)
Dept: PHARMACY | Facility: CLINIC | Age: 37
End: 2022-03-24
Payer: COMMERCIAL

## 2022-03-24 ENCOUNTER — TELEPHONE (OUTPATIENT)
Dept: GASTROENTEROLOGY | Facility: CLINIC | Age: 37
End: 2022-03-24
Payer: COMMERCIAL

## 2022-03-24 DIAGNOSIS — K50.813 CROHN'S DISEASE OF BOTH SMALL AND LARGE INTESTINE WITH FISTULA: ICD-10-CM

## 2022-03-24 RX ORDER — ADALIMUMAB 40MG/0.8ML
KIT SUBCUTANEOUS
Qty: 4 PEN | Refills: 5 | Status: SHIPPED | OUTPATIENT
Start: 2022-03-24 | End: 2022-06-20 | Stop reason: SDUPTHER

## 2022-03-24 NOTE — TELEPHONE ENCOUNTER
Incoming call from patient regarding prescription for Humira. Pt has a new insurance and would like to fill with [facility] now. Per chart review, appears [facility] already completed PA. However, claim rejects with plan limitations exceeded. Pt requests urgent work up as this is a continuation of therapy and he is due for his dose today, 3/24.     Forwarding to assigned [facility] team.

## 2022-03-24 NOTE — TELEPHONE ENCOUNTER
Received message from patient requesting send humira rx to [facility]. rx pending in different encounter

## 2022-03-24 NOTE — TELEPHONE ENCOUNTER
----- Message from Esmer Guerra sent at 3/24/2022 10:20 AM CDT -----  Contact: Pt  Pt's requesting a call back re: Rx below - [facility] does not accept current ins. Also, pt prefers [facility] to refill Rx.    HUMIRA PEN PnKt injection    Confirmed contact info below:  Contact Name: Joss Antonio Juju  Phone Number: [phone]

## 2022-03-24 NOTE — TELEPHONE ENCOUNTER
Refill request for humira    Allergies reviewed     Drug Monitoring labs:  CBC and CMP every 6 months  TB Gold, HBsAG and HBcAb yearly       Lab Results   Component Value Date    HEPBSAG Negative 04/29/2021    HEPBCAB Negative 04/29/2021     Lab Results   Component Value Date    TBGOLDPLUS Negative 10/12/2021     Lab Results   Component Value Date    TWXAPJBG45UQ 28 (L) 10/12/2021    KAFKZLVB68 504 10/12/2021     Lab Results   Component Value Date    WBC 8.61 10/12/2021    HGB 15.8 10/12/2021    HCT 47.1 10/12/2021    MCV 88 10/12/2021     10/12/2021     Lab Results   Component Value Date    CREATININE 0.8 10/12/2021    ALBUMIN 4.7 10/12/2021    BILITOT 1.3 (H) 10/12/2021    ALKPHOS 43 (L) 10/12/2021    AST 31 10/12/2021    ALT 64 (H) 10/12/2021       Labs due:  4/2022    Next appt: 4/5/2022    RX pended

## 2022-03-28 NOTE — TELEPHONE ENCOUNTER
Patient called to check status of Humira as he states that he needed a refill urgently. PA on file for patient is for 2 pens every 21 days, called to perform verbal PA for 4 pens for a 28 day supply. PA ref # [id number] PA completed as urgent.     Alvarado Sena, PharmD  Clinical Pharmacist  [facility]  P: [phone]      Home

## 2022-03-29 ENCOUNTER — PATIENT MESSAGE (OUTPATIENT)
Dept: GASTROENTEROLOGY | Facility: CLINIC | Age: 37
End: 2022-03-29
Payer: COMMERCIAL

## 2022-03-29 ENCOUNTER — PATIENT MESSAGE (OUTPATIENT)
Dept: PHARMACY | Facility: CLINIC | Age: 37
End: 2022-03-29
Payer: COMMERCIAL

## 2022-03-29 NOTE — TELEPHONE ENCOUNTER
Patient returned call and confirmed he has a Humira copay card already. Informed patient of the requirement to fill with [facility] ph: [phone]. Patient verbalized understanding. Completing No go and closing referral.

## 2022-03-29 NOTE — TELEPHONE ENCOUNTER
Incoming call from patient to confirm that Rx was electronically routed to [facility] -- confirmed, and informed the patient that [facility] may need a few hours to get the Rx to process. Patient said he would follow up with [facility] later. Patient also requested Humira copay card information to relay to [facility]. I provided patient with information and sent copay card info via [employer] to patient.

## 2022-03-29 NOTE — TELEPHONE ENCOUNTER
Call to PA line to check on Humira. PA is approved, no additional information was available. Will forward urgently to BI/FA, patient most likely must fill with [facility].     Alvarado Sena, PharmD  Clinical Pharmacist  [facility]  P: [phone]

## 2022-03-29 NOTE — TELEPHONE ENCOUNTER
Outgoing patient call- no answer/left message    Calling patient to inform of [employer] and [email or web address] for copay card. Current copay is $60. I will forward to Assigned pharmacist for [employer]

## 2022-03-30 DIAGNOSIS — K50.813 CROHN'S DISEASE OF BOTH SMALL AND LARGE INTESTINE WITH FISTULA: ICD-10-CM

## 2022-03-30 RX ORDER — ADALIMUMAB 40MG/0.8ML
KIT SUBCUTANEOUS
Qty: 4 PEN | Refills: 5 | Status: CANCELLED | OUTPATIENT
Start: 2022-03-30

## 2022-03-31 ENCOUNTER — PATIENT MESSAGE (OUTPATIENT)
Dept: GASTROENTEROLOGY | Facility: CLINIC | Age: 37
End: 2022-03-31
Payer: COMMERCIAL

## 2022-03-31 NOTE — TELEPHONE ENCOUNTER
----- Message from Inna Gallo sent at 3/31/2022 10:35 AM CDT -----  Contact: [facility]    [phone]  Calling to speak with someone concerning pts prescription for adalimumab (HUMIRA PEN) PnKt injection.  We will need a qty of 4 instead of 2 and a PA will be needed for the additional 2.

## 2022-03-31 NOTE — TELEPHONE ENCOUNTER
Called and spoke to pharmacy   -rx directions was updated  -PA information was given to pharmacist  -pt expressed understanding   -all questions and concerns were answered

## 2022-04-05 ENCOUNTER — PATIENT MESSAGE (OUTPATIENT)
Dept: HEPATOLOGY | Facility: CLINIC | Age: 37
End: 2022-04-05
Payer: COMMERCIAL

## 2022-04-05 ENCOUNTER — LAB VISIT (OUTPATIENT)
Dept: LAB | Facility: HOSPITAL | Age: 37
End: 2022-04-05
Attending: INTERNAL MEDICINE
Payer: COMMERCIAL

## 2022-04-05 ENCOUNTER — OFFICE VISIT (OUTPATIENT)
Dept: GASTROENTEROLOGY | Facility: CLINIC | Age: 37
End: 2022-04-05
Payer: COMMERCIAL

## 2022-04-05 VITALS
DIASTOLIC BLOOD PRESSURE: 86 MMHG | SYSTOLIC BLOOD PRESSURE: 163 MMHG | OXYGEN SATURATION: 100 % | WEIGHT: 228.19 LBS | TEMPERATURE: 99 F | HEART RATE: 80 BPM | BODY MASS INDEX: 30.1 KG/M2

## 2022-04-05 DIAGNOSIS — K50.113 CROHN'S DISEASE OF LARGE INTESTINE WITH FISTULA: ICD-10-CM

## 2022-04-05 DIAGNOSIS — R79.89 ABNORMAL LIVER FUNCTION TEST: ICD-10-CM

## 2022-04-05 DIAGNOSIS — D84.9 IMMUNOSUPPRESSED STATUS: Primary | ICD-10-CM

## 2022-04-05 DIAGNOSIS — K50.813 CROHN'S DISEASE OF BOTH SMALL AND LARGE INTESTINE WITH FISTULA: ICD-10-CM

## 2022-04-05 LAB
ALBUMIN SERPL BCP-MCNC: 4.3 G/DL (ref 3.5–5.2)
ALP SERPL-CCNC: 41 U/L (ref 55–135)
ALT SERPL W/O P-5'-P-CCNC: 41 U/L (ref 10–44)
ANION GAP SERPL CALC-SCNC: 8 MMOL/L (ref 8–16)
AST SERPL-CCNC: 21 U/L (ref 10–40)
BASOPHILS # BLD AUTO: 0.09 K/UL (ref 0–0.2)
BASOPHILS NFR BLD: 1.1 % (ref 0–1.9)
BILIRUB SERPL-MCNC: 0.8 MG/DL (ref 0.1–1)
BUN SERPL-MCNC: 9 MG/DL (ref 6–20)
CALCIUM SERPL-MCNC: 9.8 MG/DL (ref 8.7–10.5)
CHLORIDE SERPL-SCNC: 102 MMOL/L (ref 95–110)
CO2 SERPL-SCNC: 29 MMOL/L (ref 23–29)
CREAT SERPL-MCNC: 0.8 MG/DL (ref 0.5–1.4)
DIFFERENTIAL METHOD: ABNORMAL
EOSINOPHIL # BLD AUTO: 0.6 K/UL (ref 0–0.5)
EOSINOPHIL NFR BLD: 7.7 % (ref 0–8)
ERYTHROCYTE [DISTWIDTH] IN BLOOD BY AUTOMATED COUNT: 12.4 % (ref 11.5–14.5)
EST. GFR  (AFRICAN AMERICAN): >60 ML/MIN/1.73 M^2
EST. GFR  (NON AFRICAN AMERICAN): >60 ML/MIN/1.73 M^2
GLUCOSE SERPL-MCNC: 97 MG/DL (ref 70–110)
HCT VFR BLD AUTO: 44 % (ref 40–54)
HGB BLD-MCNC: 14.3 G/DL (ref 14–18)
IMM GRANULOCYTES # BLD AUTO: 0.02 K/UL (ref 0–0.04)
IMM GRANULOCYTES NFR BLD AUTO: 0.3 % (ref 0–0.5)
LYMPHOCYTES # BLD AUTO: 3 K/UL (ref 1–4.8)
LYMPHOCYTES NFR BLD: 38.4 % (ref 18–48)
MCH RBC QN AUTO: 29.1 PG (ref 27–31)
MCHC RBC AUTO-ENTMCNC: 32.5 G/DL (ref 32–36)
MCV RBC AUTO: 89 FL (ref 82–98)
MONOCYTES # BLD AUTO: 0.6 K/UL (ref 0.3–1)
MONOCYTES NFR BLD: 7.6 % (ref 4–15)
NEUTROPHILS # BLD AUTO: 3.6 K/UL (ref 1.8–7.7)
NEUTROPHILS NFR BLD: 44.9 % (ref 38–73)
NRBC BLD-RTO: 0 /100 WBC
PLATELET # BLD AUTO: 385 K/UL (ref 150–450)
PMV BLD AUTO: 10.2 FL (ref 9.2–12.9)
POTASSIUM SERPL-SCNC: 4.9 MMOL/L (ref 3.5–5.1)
PROT SERPL-MCNC: 7.3 G/DL (ref 6–8.4)
RBC # BLD AUTO: 4.92 M/UL (ref 4.6–6.2)
SODIUM SERPL-SCNC: 139 MMOL/L (ref 136–145)
WBC # BLD AUTO: 7.91 K/UL (ref 3.9–12.7)

## 2022-04-05 PROCEDURE — 80053 COMPREHEN METABOLIC PANEL: CPT | Performed by: INTERNAL MEDICINE

## 2022-04-05 PROCEDURE — 3079F DIAST BP 80-89 MM HG: CPT | Mod: CPTII,S$GLB,, | Performed by: INTERNAL MEDICINE

## 2022-04-05 PROCEDURE — 1159F PR MEDICATION LIST DOCUMENTED IN MEDICAL RECORD: ICD-10-PCS | Mod: CPTII,S$GLB,, | Performed by: INTERNAL MEDICINE

## 2022-04-05 PROCEDURE — 99215 PR OFFICE/OUTPT VISIT, EST, LEVL V, 40-54 MIN: ICD-10-PCS | Mod: S$GLB,,, | Performed by: INTERNAL MEDICINE

## 2022-04-05 PROCEDURE — 1160F PR REVIEW ALL MEDS BY PRESCRIBER/CLIN PHARMACIST DOCUMENTED: ICD-10-PCS | Mod: CPTII,S$GLB,, | Performed by: INTERNAL MEDICINE

## 2022-04-05 PROCEDURE — 3077F PR MOST RECENT SYSTOLIC BLOOD PRESSURE >= 140 MM HG: ICD-10-PCS | Mod: CPTII,S$GLB,, | Performed by: INTERNAL MEDICINE

## 2022-04-05 PROCEDURE — 85025 COMPLETE CBC W/AUTO DIFF WBC: CPT | Performed by: INTERNAL MEDICINE

## 2022-04-05 PROCEDURE — 3008F PR BODY MASS INDEX (BMI) DOCUMENTED: ICD-10-PCS | Mod: CPTII,S$GLB,, | Performed by: INTERNAL MEDICINE

## 2022-04-05 PROCEDURE — 36415 COLL VENOUS BLD VENIPUNCTURE: CPT | Performed by: INTERNAL MEDICINE

## 2022-04-05 PROCEDURE — 86704 HEP B CORE ANTIBODY TOTAL: CPT | Performed by: INTERNAL MEDICINE

## 2022-04-05 PROCEDURE — 1160F RVW MEDS BY RX/DR IN RCRD: CPT | Mod: CPTII,S$GLB,, | Performed by: INTERNAL MEDICINE

## 2022-04-05 PROCEDURE — 3008F BODY MASS INDEX DOCD: CPT | Mod: CPTII,S$GLB,, | Performed by: INTERNAL MEDICINE

## 2022-04-05 PROCEDURE — 99215 OFFICE O/P EST HI 40 MIN: CPT | Mod: S$GLB,,, | Performed by: INTERNAL MEDICINE

## 2022-04-05 PROCEDURE — 1159F MED LIST DOCD IN RCRD: CPT | Mod: CPTII,S$GLB,, | Performed by: INTERNAL MEDICINE

## 2022-04-05 PROCEDURE — 87340 HEPATITIS B SURFACE AG IA: CPT | Performed by: INTERNAL MEDICINE

## 2022-04-05 PROCEDURE — 3077F SYST BP >= 140 MM HG: CPT | Mod: CPTII,S$GLB,, | Performed by: INTERNAL MEDICINE

## 2022-04-05 PROCEDURE — 3079F PR MOST RECENT DIASTOLIC BLOOD PRESSURE 80-89 MM HG: ICD-10-PCS | Mod: CPTII,S$GLB,, | Performed by: INTERNAL MEDICINE

## 2022-04-05 NOTE — PROGRESS NOTES
"     [facility]             Inflammatory Bowel Disease         Follow-up  Note              TODAY'S VISIT DATE:  4/5/2022    Chief Complaint:   Chief Complaint   Patient presents with    Crohn's Disease     PCP: Primary Doctor No    Previous History:  Joss Matute is a 36 y.o. male with Crohn's disease (ileum, rectal ulcer, recurrent perianal fistula/abscess- 12/96, 3/2017, 12/2019, setons last removed 6/2020).  He began with symptoms of a perirectal abscess in December 1996 that was initially drained in the ED.  The abscess recurred in December 1997 and was associated with vomiting, weight loss, and abdominal pain.  His initial colonoscopy in 1998 (no actual record just reported in clinic note) showed moderately severe ileitis with biopsies of the right/transverse/left colon and rectum with mild nonspecific acute and chronic reactive changes.  The transverse colon polyp (likely removed) pathology showed it to be "edematous with mild glandular dysplasia."  At that time he was finally diagnosed with Crohn's disease but unsure of the exact location.  Initially he recalls being given prednisone for 4-5 months and pentasa for maintenance.  By 2002, age 17, he stopped all medications and did not have any GI follow up.  He had a hospital admission for a "flare" with dehydration in 2004 and again was not started on any medications.  He was seen in 2007 for RLQ abdominal cramping, increasing fatigue, and increased bowel frequency with blood and mucous.  He had a repeat colonoscopy in 2/2007 that showed patchy area of mucosa in the distal 4 cm of the TI with normal biopsies.  He had recurrent abscess/fistula in 2008 that spontaneously drained and was seen by CRS and did not wish to take any medications.  He reports intermittent abscess/perianal fistula from [id number] with chronic drainage that would cause he to intermittently seek treatment of I&D in the ER.  In 10/2016 he established care with Dr." Braydon when he had a recurrent perianal fistula who planned to start patient on humira after surgical intervention.  He was seen by Dr. Cason on 3/15/2017 and had an EUA with seton placement.  Colonoscopy 4/7/2017 with Dr. LEANDRO Spence showed showed perianal skin tag, seton intact and small rectal ulcer with biopsies consistent with patchy active colitis with features of chronic mucosal injury.  He started Humira on 5/1/2017.  Setons were removed by Dr. Cason in 7/2017.  He started oral MTX in 8/2017 for immunogenicity prevention.  Trough [facility] humira levels were 2.5 with ABs 57 (low) on 1/8/2018 so we planned to increase humira to 40 mg SC weekly and start oral MTX 12.5 mg PO weekly with daily folic acid.  He started humira 40 mg SC every 7 days on 2/6/2018.  Repeat trough [facility] Humira levels were 15 with low ABs of 30 on 5/17/2018.  He had elevated LFTs on 8/13/2018 so oral MTX was discontinued.  Colonoscopy on 10/10/2018 showed a small perianal skin tar, rectal fistula, other normal rectum, colon, and ileum with normal biopsies.  Trough [facility] Humira level 20 with low ABs 26 on 11/16/2018 on humira 40 mg SC every 7 days.  Patient was seen by Dr. Monterroso to assess his fistula and discuss repair on 3/13/2019.  MRI on 3/20/2019 ordered by Dr. Monterroso showed three perianal fistulas with 2 appeared to have blind endings and one extending into the skin surface at the right medial gluteal fold.  On 5/13/2019 patient had some extreme diarrhea and abdominal pain last for a few hours and then resolved.  On 12/29/2019 patient had perianal drainage and rectal pain and started on ciprofloxacin/Flagyl.  He had exam under anesthesia on 12/30/2019 which showed at which time perirectal abscess was drained and seton inserted.  On 1/2/2020 patient had Humira level of 13 with antibodies 29.  Colonoscopy on 3/10/2020 showed anal skin tags with seton int with a few areas mucosal scarring in the rectum and remainder of the colon and  "terminal ileum normal.  MRE on 3/10/2020 showed some mild postcontrast enhancement of the terminal ileum likely due to chronic inflammatory change though no active disease and perirectal fistulas better visualized on the anal sphincter MRI noting interval seton placement. In 2020 he had elevated LFTs and after discontinuing traci man vitamin, amino acids and creatine this improved. He had some anal pain and was seen in clinic with Dr. Monterroso 2020 and prescribed course of augmentin and plans for MRI though hospitalized a day after the clinic visit due to worsening pain and CT showed perianal abscess with EUA showing fistula at level of dentate line with pus drained and seton placed and completed 2 week course of augmentin.  On 10/2020 trough humira levels 13/Abs neg on humira 40 mg SC q 7 days.     Interval History:  - current IBD meds: Humira 40 mg SC q weekly (started 17, 40 mg weekly 2018, LD , ND - was delayed due to insurance by a week-was supposed to take 3/23 and took on )  - other GI meds: imodium 2-4 times/week  - 2 liquid to formed BMs/day, no blood, urgency or nocturnal BMs  - 10/12/21 colonoscopy:  perianal fistula with seton, normal colon and terminal ileum.  Biopsies of terminal ileum normal, rectum with focal minimal architectural distortion, seton removed by Dr. Monterroso during scope  - 10/12/21 trough humira levels 13/Abs neg  - NSAID use: No  - Narcotic use: No  - Alternative/complementary meds for IBD: No      Prior Pertinent Surgeries:   previous fistula repair Xs 2  3/15/2017: EUA with seton placement  20 Setons removed   Dr. Monterroso. anal fistula EUA with seton placement    Last pertinent Endoscopy/Imagin Colonoscopy: (per clinic note path only) showed moderately severe ileitis; right, transverse, left, and rectum showed mild non-specific acute and chronic reactive changes; " transverse polyp path showed it to be edematous with mild glandular " "dysplasia"  2/22/2007 Colonoscopy: patchy area of mucosa in the ileum was nodular and granular (most distal 4 cm of TI) (path: normal); moderate amount of semisolid stool in the entire colon  5/23/2012 SBFT: normal  1/6/2017 Colonoscopy: fistula in the mauricio-anal area with no active drainage; normal mucosa in the whole colon and TI (no biopsies)  1/18/2017 MRE: no definitive evidence for abnormal mucosal enhancement within the small bowel; suspicious early fistulous tract/fissure within the distal rectum/perianal region tracking along the 9 oclock position rotated into the 6 oclock position and exiting along the anal canal/fissure; no evidence for abnormal enhancing structures within the abdomen/pelvis  4/7/2017 Colonoscopy: perianal skin tag; small rectal ulcer in the rectum (seen on retroflexion) (path: ulcer and adjacent patchy active colitis and features of chronic mucosal injury), remainder colon normal (path: normal); normal TI (path: normal); seton intact   3/20/2019 MRI pelvis: Three perianal fistulous, two appear blind ending and one of which extends to the skin surface at the right medial gluteal fold.  No evidence for abscess  3/10/20 colonoscopy: skin tags and seton intact, few rare mucosal scars in rectum with remainder of rectum and colon normal. Terminal ileum normal. Biopsies of terminal ileum normal, biopsies of rectum with minimal architectural distortion  3/10/20 MRE:  Mild postcontrast enhancement of the terminal ileum wall likely due to chronic inflammatory change without wall thickening or intraluminal stricture. Perirectal fistulas are better visualized on prior dedicated anal sphincter MRI noting interval seton placement.  9/17/20 CTAP increased perianal soft tissue and partially organized fluid in right perirectal region (2.0 x 1.4 x 3.3cm)    Therapeutic Drug Monitoring Labs:  1/8/2018: Trough [facility] ADA Levels 2.5, ABs 57 (low) (humira 40 mg SC every 14 days)  5/17/2018: [facility] Trough " Humira Level 15, ABs 30 (low) (humira 40 mg SC every 7 days)  11/16/2018: Trough [facility] Humira Level 20, ABs 26 (low) (humira 40 mg SC every 7 days)  1/2/20 trough humira level 13/Abs 29 (low) (humira 40 mg SC every 7 days)  10/16/20 trough humira levels 13/Abs neg (humira 40 mg SC every 7 days)  10/12/21 trough humira levels 13/Abs neg    Prior IBD Meds:  Prednisone  Pentasa  MTX 12.5 mg PO weekly (started 8/2017, stopped 8/13/2018 due to elevated LFTs)    Vaccinations:  Lab Results   Component Value Date    HEPBSAB Positive (A) 08/13/2018     Lab Results   Component Value Date    HEPAIGG Positive (A) 08/13/2018     Lab Results   Component Value Date    VARICELLAZOS 2.98 (H) 04/24/2017    VARICELLAINT Positive (A) 04/24/2017     Immunization History   Administered Date(s) Administered    COVID-19, MRNA, LN-S, PF (Pfizer) (Purple Cap) 03/25/2021, 04/16/2021    Hepatitis A / Hepatitis B 01/25/2018, 02/22/2018, 07/26/2018    Influenza 11/18/2017, 10/05/2018, 10/28/2019, 11/11/2020, 10/01/2021    Influenza (FLUBLOK) - Quadrivalent - Recombinant - PF *Preferred* (egg allergy) 11/11/2020    Influenza - Quadrivalent - MDCK - PF 11/18/2017    Influenza - Quadrivalent - PF *Preferred* (6 months and older) 10/04/2018, 10/28/2019    Pneumococcal Conjugate - 13 Valent 04/24/2017    Pneumococcal Polysaccharide - 23 Valent 07/25/2017    Tdap 04/24/2017    Zoster Recombinant 10/12/2021, 12/16/2021     HPV: not sure, NA  Meningococcal:  not sure, NA  MMR (live vaccine): not sure, NA    Review of Systems   Constitutional: Negative for chills, fever and weight loss.   HENT:        No oral ulcers, dysphagia, oral thrush   Eyes: Negative for blurred vision, pain and redness.   Respiratory: Negative for cough and shortness of breath.    Cardiovascular: Negative for chest pain.   Gastrointestinal: Negative for abdominal pain, heartburn, nausea and vomiting.   Genitourinary: Negative for dysuria and hematuria.    Musculoskeletal: Negative for back pain and joint pain.   Skin: Negative for rash.   Psychiatric/Behavioral: Negative for depression. The patient is not nervous/anxious and does not have insomnia.      All Medical History/Surgical History/Family History/Social History/Allergies have been reviewed and updated in EMR    Review of patient's allergies indicates:  No Known Allergies    Outpatient Medications Marked as Taking for the 4/5/22 encounter (Office Visit) with Bubba Spence MD   Medication Sig Dispense Refill    adalimumab (HUMIRA PEN) PnKt injection INJECT 1 PEN UNDER THE SKIN EVERY 7 DAYS 4 pen 5    cyanocobalamin (VITAMIN B-12) 1000 MCG tablet Take 100 mcg by mouth once daily.      loperamide (IMODIUM) 2 mg capsule Take 2 mg by mouth once daily. Takes 4-5 times/week      UNABLE TO FIND OTC Men's daily Multi Vit      vitamin D (VITAMIN D3) 1000 units Tab Take 2,000 Units by mouth once daily.     BP (!) 163/86   Pulse 80   Temp 98.5 °F (36.9 °C)   Wt 103.5 kg (228 lb 2.8 oz)   SpO2 100%   BMI 30.10 kg/m²     Physical Exam  Abdominal:      General: Bowel sounds are normal. There is no distension.      Palpations: Abdomen is soft.      Tenderness: There is no abdominal tenderness.         Lab Results   Component Value Date    CRP 0.3 09/16/2020     Lab Results   Component Value Date    HEPBSAG Negative 04/29/2021    HEPBCAB Negative 04/29/2021     Lab Results   Component Value Date    TBGOLDPLUS Negative 10/12/2021     Lab Results   Component Value Date    LKYKYJFB02SB 28 (L) 10/12/2021    [id number] 504 10/12/2021     Lab Results   Component Value Date    WBC 7.91 04/05/2022    HGB 14.3 04/05/2022    HCT 44.0 04/05/2022    MCV 89 04/05/2022     04/05/2022     Lab Results   Component Value Date    CREATININE 0.8 04/05/2022    ALBUMIN 4.3 04/05/2022    BILITOT 0.8 04/05/2022    ALKPHOS 41 (L) 04/05/2022    AST 21 04/05/2022    ALT 41 04/05/2022     Assessment/Plan:  Joss Matute is a 36  y.o. male with Crohn's disease (ileum, rectal ulcer, recurrent perianal fistula/abscess- 12/96, 3/2017, 12/2019, setons last removed 6/2020) who continues on Humira 40 mg SC weekly and doing well and had normal colonoscopy and seton removed 10/2021. He has some continued drainage at times from fistula and imodium seems to help. We discussed use of a bidet which will also help with anal pruritus from the occ leakage. At this time he is not concerned about leakage.     # Crohn's disease (ileum, rectal ulcer, recurrent perianal fistula/abscess- 12/1996, 3/2017, 12/2019, 9/2020)  - perianal fistula- sees Dr. Monterroso, leakage and taking imodium but no recurrent abscess since seton removal 10/2021  - continue Humira 40 mg SC q weekly  - colonoscopy - 4/2023  - vitamin B12 (10/2021 504)- not on supplementation  - ex-smoker:  Quit fall 2015, discussed risks of smoking and CD with patient in past  - drug monitoring labs: CBC/CMP q 6 months (today), TPMT (normal 4/2017), TB quantiferon (10/2022), Hep B testing (4/2022)  - TDM:  Trough humira levels/abs 10/2022     # Abnormal LFTs likely from alcohol  - significant decrease in alcohol intake- drinks about 4-5 drinks/week  - referral to hepatology     # IBD specific health maintenance:  CRC Risk: personal h/o of sporadic adenoma in 1998, proctitis/ileal disease with symptom onset in 1998; surveillance every 5 years   Skin exam yearly - never had skin exam and pt will establish dermatologist locally in [place]  Risk for osteopenia/osteoporosis-none  Vitamin D (10/2021 28)- not taking vit D but on MVI daily, repeat vitamin D level 10/2022   Vaccines: no live vaccines, covid booster recommended    Follow up: 6 mos    Total time: 40 min    Bubba Spence MD   Department of Gastroenterology  Medical Director, Inflammatory Bowel Disease

## 2022-04-06 LAB
HBV CORE AB SERPL QL IA: NEGATIVE
HBV SURFACE AG SERPL QL IA: NEGATIVE

## 2022-04-07 ENCOUNTER — TELEPHONE (OUTPATIENT)
Dept: HEPATOLOGY | Facility: CLINIC | Age: 37
End: 2022-04-07
Payer: COMMERCIAL

## 2022-04-07 ENCOUNTER — PATIENT MESSAGE (OUTPATIENT)
Dept: HEPATOLOGY | Facility: CLINIC | Age: 37
End: 2022-04-07
Payer: COMMERCIAL

## 2022-04-25 ENCOUNTER — TELEPHONE (OUTPATIENT)
Dept: HEPATOLOGY | Facility: CLINIC | Age: 37
End: 2022-04-25
Payer: COMMERCIAL

## 2022-04-27 ENCOUNTER — TELEPHONE (OUTPATIENT)
Dept: GASTROENTEROLOGY | Facility: CLINIC | Age: 37
End: 2022-04-27
Payer: COMMERCIAL

## 2022-04-27 ENCOUNTER — PATIENT MESSAGE (OUTPATIENT)
Dept: GASTROENTEROLOGY | Facility: CLINIC | Age: 37
End: 2022-04-27
Payer: COMMERCIAL

## 2022-06-10 ENCOUNTER — PATIENT MESSAGE (OUTPATIENT)
Dept: GASTROENTEROLOGY | Facility: CLINIC | Age: 37
End: 2022-06-10
Payer: COMMERCIAL

## 2022-06-20 ENCOUNTER — PATIENT MESSAGE (OUTPATIENT)
Dept: GASTROENTEROLOGY | Facility: CLINIC | Age: 37
End: 2022-06-20
Payer: COMMERCIAL

## 2022-06-20 ENCOUNTER — TELEPHONE (OUTPATIENT)
Dept: GASTROENTEROLOGY | Facility: CLINIC | Age: 37
End: 2022-06-20
Payer: COMMERCIAL

## 2022-06-20 DIAGNOSIS — K50.813 CROHN'S DISEASE OF BOTH SMALL AND LARGE INTESTINE WITH FISTULA: ICD-10-CM

## 2022-06-20 RX ORDER — ADALIMUMAB 40MG/0.8ML
KIT SUBCUTANEOUS
Qty: 4 PEN | Refills: 5 | Status: SHIPPED | OUTPATIENT
Start: 2022-06-20 | End: 2022-11-22 | Stop reason: SDUPTHER

## 2022-06-20 NOTE — TELEPHONE ENCOUNTER
----- Message from Lilian Cole sent at 6/17/2022  1:09 PM CDT -----  Contact: pt  Pt has has changed ins providers and needs a new rx sent to their pharmacy which is below and they also need a PA for the rx. Pt's next dose is on Thursday and he is completely out.     adalimumab (HUMIRA PEN) PnKt injection    [facility]   Phone Number: [phone]

## 2022-06-20 NOTE — TELEPHONE ENCOUNTER
Refill request for     Allergies reviewed     Drug Monitoring labs:  CBC and CMP every 6 months  TB Gold, HBsAG and HBcAb yearly       Lab Results   Component Value Date    HEPBSAG Negative 04/05/2022    HEPBCAB Negative 04/05/2022     Lab Results   Component Value Date    TBGOLDPLUS Negative 10/12/2021     Lab Results   Component Value Date    [id number] 28 (L) 10/12/2021    [id number] 504 10/12/2021     Lab Results   Component Value Date    WBC 7.91 04/05/2022    HGB 14.3 04/05/2022    HCT 44.0 04/05/2022    MCV 89 04/05/2022     04/05/2022     Lab Results   Component Value Date    CREATININE 0.8 04/05/2022    ALBUMIN 4.3 04/05/2022    BILITOT 0.8 04/05/2022    ALKPHOS 41 (L) 04/05/2022    AST 21 04/05/2022    ALT 41 04/05/2022       Labs due:  10/2022    Next appt:9/30/2022    RX pended

## 2022-06-22 ENCOUNTER — PATIENT MESSAGE (OUTPATIENT)
Dept: GASTROENTEROLOGY | Facility: CLINIC | Age: 37
End: 2022-06-22
Payer: COMMERCIAL

## 2022-07-07 ENCOUNTER — PATIENT MESSAGE (OUTPATIENT)
Dept: GASTROENTEROLOGY | Facility: CLINIC | Age: 37
End: 2022-07-07
Payer: COMMERCIAL

## 2022-07-07 ENCOUNTER — PATIENT MESSAGE (OUTPATIENT)
Dept: SURGERY | Facility: CLINIC | Age: 37
End: 2022-07-07
Payer: COMMERCIAL

## 2022-07-07 NOTE — TELEPHONE ENCOUNTER
Per Dr Spence   -schedule appt with Dr Monterroso   -we will f/u after appt       Called and spoke to pt   -pt expressed understanding   -all questions and concerns were answered   Reminder set

## 2022-07-12 ENCOUNTER — PATIENT MESSAGE (OUTPATIENT)
Dept: GASTROENTEROLOGY | Facility: CLINIC | Age: 37
End: 2022-07-12
Payer: COMMERCIAL

## 2022-07-12 ENCOUNTER — TELEPHONE (OUTPATIENT)
Dept: SURGERY | Facility: CLINIC | Age: 37
End: 2022-07-12
Payer: COMMERCIAL

## 2022-07-19 ENCOUNTER — TELEPHONE (OUTPATIENT)
Dept: SURGERY | Facility: CLINIC | Age: 37
End: 2022-07-19
Payer: COMMERCIAL

## 2022-07-19 NOTE — TELEPHONE ENCOUNTER
Spoke with patient, calling to reschedule due to an endoscopy schedule change for Dr Monterroso at the [place] location. Patient states that he is not having any problems and would like to just cancel, not reschedule. Will call if problems arise.

## 2022-07-20 ENCOUNTER — PROCEDURE VISIT (OUTPATIENT)
Dept: HEPATOLOGY | Facility: CLINIC | Age: 37
End: 2022-07-20
Payer: COMMERCIAL

## 2022-07-20 ENCOUNTER — OFFICE VISIT (OUTPATIENT)
Dept: HEPATOLOGY | Facility: CLINIC | Age: 37
End: 2022-07-20
Payer: COMMERCIAL

## 2022-07-20 ENCOUNTER — LAB VISIT (OUTPATIENT)
Dept: LAB | Facility: HOSPITAL | Age: 37
End: 2022-07-20
Payer: COMMERCIAL

## 2022-07-20 ENCOUNTER — TELEPHONE (OUTPATIENT)
Dept: HEPATOLOGY | Facility: CLINIC | Age: 37
End: 2022-07-20

## 2022-07-20 VITALS
DIASTOLIC BLOOD PRESSURE: 78 MMHG | HEART RATE: 86 BPM | HEIGHT: 73 IN | RESPIRATION RATE: 18 BRPM | WEIGHT: 219.81 LBS | TEMPERATURE: 98 F | OXYGEN SATURATION: 97 % | SYSTOLIC BLOOD PRESSURE: 132 MMHG | BODY MASS INDEX: 29.13 KG/M2

## 2022-07-20 DIAGNOSIS — R17 ELEVATED BILIRUBIN: ICD-10-CM

## 2022-07-20 DIAGNOSIS — R79.89 ABNORMAL LIVER FUNCTION TEST: ICD-10-CM

## 2022-07-20 DIAGNOSIS — R74.8 ELEVATED LIVER ENZYMES: Primary | ICD-10-CM

## 2022-07-20 DIAGNOSIS — K50.113 CROHN'S DISEASE OF LARGE INTESTINE WITH FISTULA: ICD-10-CM

## 2022-07-20 LAB
A1AT SERPL-MCNC: 82 MG/DL (ref 100–190)
ALBUMIN SERPL BCP-MCNC: 4.5 G/DL (ref 3.5–5.2)
ALP SERPL-CCNC: 38 U/L (ref 55–135)
ALT SERPL W/O P-5'-P-CCNC: 42 U/L (ref 10–44)
AST SERPL-CCNC: 29 U/L (ref 10–40)
BILIRUB DIRECT SERPL-MCNC: 0.2 MG/DL (ref 0.1–0.3)
BILIRUB SERPL-MCNC: 0.7 MG/DL (ref 0.1–1)
CERULOPLASMIN SERPL-MCNC: 19 MG/DL (ref 15–45)
CK SERPL-CCNC: 1085 U/L (ref 20–200)
FERRITIN SERPL-MCNC: 251 NG/ML (ref 20–300)
IGG SERPL-MCNC: 1464 MG/DL (ref 650–1600)
INR PPP: 1.1 (ref 0.8–1.2)
IRON SERPL-MCNC: 114 UG/DL (ref 45–160)
PROT SERPL-MCNC: 7.6 G/DL (ref 6–8.4)
PROTHROMBIN TIME: 11.1 SEC (ref 9–12.5)
SATURATED IRON: 31 % (ref 20–50)
TOTAL IRON BINDING CAPACITY: 364 UG/DL (ref 250–450)
TRANSFERRIN SERPL-MCNC: 246 MG/DL (ref 200–375)

## 2022-07-20 PROCEDURE — 3078F PR MOST RECENT DIASTOLIC BLOOD PRESSURE < 80 MM HG: ICD-10-PCS | Mod: CPTII,S$GLB,, | Performed by: PHYSICIAN ASSISTANT

## 2022-07-20 PROCEDURE — 3075F PR MOST RECENT SYSTOLIC BLOOD PRESS GE 130-139MM HG: ICD-10-PCS | Mod: CPTII,S$GLB,, | Performed by: PHYSICIAN ASSISTANT

## 2022-07-20 PROCEDURE — 1160F RVW MEDS BY RX/DR IN RCRD: CPT | Mod: CPTII,S$GLB,, | Performed by: PHYSICIAN ASSISTANT

## 2022-07-20 PROCEDURE — 99999 PR PBB SHADOW E&M-EST. PATIENT-LVL IV: CPT | Mod: PBBFAC,,, | Performed by: PHYSICIAN ASSISTANT

## 2022-07-20 PROCEDURE — 91200 LIVER ELASTOGRAPHY: CPT | Mod: S$GLB,,, | Performed by: PHYSICIAN ASSISTANT

## 2022-07-20 PROCEDURE — 82390 ASSAY OF CERULOPLASMIN: CPT | Performed by: PHYSICIAN ASSISTANT

## 2022-07-20 PROCEDURE — 86256 FLUORESCENT ANTIBODY TITER: CPT | Performed by: PHYSICIAN ASSISTANT

## 2022-07-20 PROCEDURE — 3075F SYST BP GE 130 - 139MM HG: CPT | Mod: CPTII,S$GLB,, | Performed by: PHYSICIAN ASSISTANT

## 2022-07-20 PROCEDURE — 82784 ASSAY IGA/IGD/IGG/IGM EACH: CPT | Performed by: PHYSICIAN ASSISTANT

## 2022-07-20 PROCEDURE — 99999 PR PBB SHADOW E&M-EST. PATIENT-LVL IV: ICD-10-PCS | Mod: PBBFAC,,, | Performed by: PHYSICIAN ASSISTANT

## 2022-07-20 PROCEDURE — 3008F PR BODY MASS INDEX (BMI) DOCUMENTED: ICD-10-PCS | Mod: CPTII,S$GLB,, | Performed by: PHYSICIAN ASSISTANT

## 2022-07-20 PROCEDURE — 84466 ASSAY OF TRANSFERRIN: CPT | Performed by: PHYSICIAN ASSISTANT

## 2022-07-20 PROCEDURE — 99204 OFFICE O/P NEW MOD 45 MIN: CPT | Mod: S$GLB,,, | Performed by: PHYSICIAN ASSISTANT

## 2022-07-20 PROCEDURE — 99204 PR OFFICE/OUTPT VISIT, NEW, LEVL IV, 45-59 MIN: ICD-10-PCS | Mod: S$GLB,,, | Performed by: PHYSICIAN ASSISTANT

## 2022-07-20 PROCEDURE — 3008F BODY MASS INDEX DOCD: CPT | Mod: CPTII,S$GLB,, | Performed by: PHYSICIAN ASSISTANT

## 2022-07-20 PROCEDURE — 91200 FIBROSCAN (VIBRATION CONTROLLED TRANSIENT ELASTOGRAPHY): ICD-10-PCS | Mod: S$GLB,,, | Performed by: PHYSICIAN ASSISTANT

## 2022-07-20 PROCEDURE — 1159F MED LIST DOCD IN RCRD: CPT | Mod: CPTII,S$GLB,, | Performed by: PHYSICIAN ASSISTANT

## 2022-07-20 PROCEDURE — 82103 ALPHA-1-ANTITRYPSIN TOTAL: CPT | Performed by: PHYSICIAN ASSISTANT

## 2022-07-20 PROCEDURE — 36415 COLL VENOUS BLD VENIPUNCTURE: CPT | Performed by: PHYSICIAN ASSISTANT

## 2022-07-20 PROCEDURE — 85610 PROTHROMBIN TIME: CPT | Performed by: PHYSICIAN ASSISTANT

## 2022-07-20 PROCEDURE — 3078F DIAST BP <80 MM HG: CPT | Mod: CPTII,S$GLB,, | Performed by: PHYSICIAN ASSISTANT

## 2022-07-20 PROCEDURE — 1160F PR REVIEW ALL MEDS BY PRESCRIBER/CLIN PHARMACIST DOCUMENTED: ICD-10-PCS | Mod: CPTII,S$GLB,, | Performed by: PHYSICIAN ASSISTANT

## 2022-07-20 PROCEDURE — 82728 ASSAY OF FERRITIN: CPT | Performed by: PHYSICIAN ASSISTANT

## 2022-07-20 PROCEDURE — 1159F PR MEDICATION LIST DOCUMENTED IN MEDICAL RECORD: ICD-10-PCS | Mod: CPTII,S$GLB,, | Performed by: PHYSICIAN ASSISTANT

## 2022-07-20 PROCEDURE — 82550 ASSAY OF CK (CPK): CPT | Performed by: PHYSICIAN ASSISTANT

## 2022-07-20 PROCEDURE — 80076 HEPATIC FUNCTION PANEL: CPT | Performed by: PHYSICIAN ASSISTANT

## 2022-07-20 NOTE — PROGRESS NOTES
Hepatology Consultation: [facility]    NEW PATIENT  PCP: Primary Doctor No    Subjective      Mr. Matute is a 36 y.o. male with PMH as listed below who presents to clinic for a history of elevated liver enzymes, predominantly hepatocellular, in the context of Crohn's colitis. He has also had 1 instance of elevated Tbili 1.3 on 10/12/21. His labs upon recheck normalized. He is negative for viral hepatitis B & C.     Lab Results   Component Value Date    ALT 41 04/05/2022    AST 21 04/05/2022    ALKPHOS 41 (L) 04/05/2022    BILITOT 0.8 04/05/2022    ALBUMIN 4.3 04/05/2022     04/05/2022       Initial history 07/20/2022:  Joss Matute arrives today alone.    On [facility] [place] for Crohn's, F/b Dr Spence.    Reports that he feels well. He is well-appearingDenies symptoms of hepatic decompensation including jaundice, abdominal distention or lower extremity swelling, hematemesis, or periods of confusion suggestive of hepatic encephalopathy.    Vaccinated against HAV/HBV.    Prior elevation of liver enzymes intermittent since at least 2018.    He reports a history of heavy alcohol use, at least 3-6 beers per day, stopped entirely 2 months ago.    Currently on a healthy diet - low carb, high protein, more frequent meals, he has lost 20 lbs.   He is going to the gym and exercising with weight lifting. Once a week will take BCAAs. He drinks water and black coffee. No alcohol whatsoever.      Supplements: mens multivitamin daily BCAA supplement pre-workout once a week (lift weights)  ETOH: haven't drank in over 2 months moderate - prior to this 3-6 beers per night 18 years 17 years   Smoking: denies   Illicit substances: denies   Social: lives with wife and baby   Prior liver biopsy: denies  Prior liver disease: denies   Hepatitis vaccination: yes   Liver-related family medical history: denies fhx liver cancer, cirrhosis, hepatitis.      PMH Joss Alvarez has a past medical history  "of Crohn's disease, Ex-smoker (1/24/2018), Immunosuppressed status (1/27/2020), Penile venereal warts (1/25/2018), Vitamin B12 deficiency, and Vitamin D deficiency (6/26/2017).   PSXH Joss Alvarez has a past surgical history that includes Colonoscopy; Colonoscopy (N/A, 4/7/2017); Abscess drainage; Anal fistulotomy; Upper gastrointestinal endoscopy; Colonoscopy (N/A, 10/10/2018); Examination under anesthesia (N/A, 12/30/2019); Incision and drainage of abscess (12/30/2019); Incision of perirectal abscess (12/30/2019); Insertion of seton stitch (N/A, 12/30/2019); Colonoscopy (N/A, 3/10/2020); Examination under anesthesia (N/A, 9/18/2020); Insertion of seton stitch (9/18/2020); Incision and drainage of abscess (9/18/2020); and Colonoscopy (N/A, 10/12/2021).   FH Joss Alvarez's family history includes ALS in his paternal grandfather; No Known Problems in his brother, brother, father, mother, sister, and sister.   SH Joss Alvarez reports that he quit smoking about 6 years ago. His smoking use included cigarettes. He has never used smokeless tobacco. He reports current alcohol use. He reports that he does not use drugs.   ALG Joss Alvarez has No Known Allergies.   MED Joss Alvarez has a current medication list which includes the following prescription(s): humira pen, loperamide, and UNABLE TO FIND.     ROS:   GENERAL: Denies fatigue  HEENT: Denies yellowing of eyes   CARDIOVASCULAR: Denies edema  GI: Denies abdominal pain  SKIN: Denies rash, itching   NEURO: Denies confusion, memory loss, or mood changes  HEME/LYMPH: Denies easy bruising or bleeding    Objective     /78 (BP Location: Right arm, Patient Position: Sitting, BP Method: Medium (Automatic))   Pulse 86   Temp 98.2 °F (36.8 °C) (Oral)   Resp 18   Ht 6' 1" (1.854 m)   Wt 99.7 kg (219 lb 12.8 oz)   SpO2 97%   BMI 29.00 kg/m²     PHYSICAL EXAM:   Friendly man, in no acute distress; alert and oriented to person, place and time  EYES: Sclerae anicteric  GI: Soft, non-tender, " non-distended. No ascites.  EXTREMITIES:  No edema.  SKIN: Warm and dry. No jaundice. No telangectasias noted. No palmar erythema.  NEURO:  Normal gait. No asterixis.  PSYCH:  Memory intact. Thought and speech pattern appropriate. Behavior normal      DIAGNOSTICS    Lab Results   Component Value Date    ALT 41 04/05/2022    AST 21 04/05/2022    ALKPHOS 41 (L) 04/05/2022    BILITOT 0.8 04/05/2022    ALBUMIN 4.3 04/05/2022     04/05/2022     No results found for: AFP    In relation to metabolic risk factors:   Lab Results   Component Value Date    CHOL 187 10/16/2020     Body mass index is 29 kg/m².      Prior serologic workup:   Lab Results   Component Value Date    HEPBSAG Negative 04/05/2022    HEPCAB Negative 10/16/2020       Imaging:  CT Pelvis With Contrast  Narrative: EXAMINATION:  CT PELVIS WITH  CONTRAST    CLINICAL HISTORY:  Abscess, anal or rectal;Known perianal fistula;    TECHNIQUE:  Helical CT images of the pelvis were obtained after the intravenous administration of 100 cc Omnipaque 350 intravenous contrast.  30 cc of rectal Omnipaque 350 contrast was reportedly administered.  Axial, coronal, and sagittal reconstructions were created from the source data.    COMPARISON:  MRI anal sphincter 12/29/2019    FINDINGS:  Genitourinary: Visualized course the distal ureters are unremarkable.  Bladder demonstrates smooth contours without bladder wall thickening.    Reproductive organs: Normal.    GI tract/Mesentery: Visualized loops of small and large bowel are normal in caliber without evidence for inflammation or obstruction.  The appendix is visualized and is unremarkable.  There is a rectal tube in place but no rectal contrast is visualized within the rectum or distal colon.  Correlation is needed.    Peritoneal Space: No abdominopelvic ascites or intraperitoneal free air.    Abdominal wall: Small amount of perirectal edema.  Ill-defined perianal fluid attenuation which may reflect complex perianal  fistula.  Small perirectal fluid collection on the right is ill defined but measures approximately 2.0 x 1.4 x 3.3 cm (AP by TV by cc) on axial series 2, image 86 and sagittal series 602, image 126.    Vasculature: Aortic bifurcation is unremarkable.  No significant atherosclerotic disease.    Bones: Normal appearance without acute fracture or bony destructive process.  Impression: Increased perianal soft tissue and partially organized fluid in the right perirectal region measuring 2.0 x 1.4 x 3.3 cm (AP by TV by CC).  Findings likely indicate perianal fistula and small perirectal abscess.  Consider follow-up with colorectal surgery to determine if further delineation with pelvic MRI is warranted.    This report was flagged in Epic as abnormal.    Electronically signed by resident: Frida Carson  Date:    09/17/2020  Time:    21:23    Electronically signed by: Durga Gonzalez MD  Date:    09/17/2020  Time:    21:58      Assessment/Plan     36 y.o. male with:    1. Abnormal liver function test  - US Abdomen Limited; Future  - Alpha-1-Antitrypsin; Future  - IgG; Future  - Anti-Smooth Muscle Antibody; Future  - Ceruloplasmin; Future  - CK; Future  - Ferritin; Future  - Iron and TIBC; Future  - Hepatic Function Panel; Future  - Protime-INR; Standing  - FibroScan (Vibration Controlled Transient Elastography); Future    2. Crohn's disease of large intestine with fistula  3. Elevated liver enzymes  - intermittent ALT>AST    4. BMI 29.0-29.9,adult  - risk factor for fatty liver, in conjunction     5. Elevated bilirubin  - resolved      Orders Placed This Encounter   Procedures    FibroScan (Vibration Controlled Transient Elastography)    US Abdomen Limited    Alpha-1-Antitrypsin    IgG    Anti-Smooth Muscle Antibody    Ceruloplasmin    CK    Ferritin    Iron and TIBC    Hepatic Function Panel    Protime-INR       · Labs soon ultrasound soon. Given history of Crohn's recommend limited work-up now.  · Fibroscan  with follow-up visit, in 4 weeks.        I spent 45 minutes on the day of this encounter preparing for, evaluating, treating, and managing this patient.     Thank you for allowing me to participate in the care of Joss Álvarez PA-C  Hepatology & Liver Transplant

## 2022-07-20 NOTE — PROCEDURES
FibroScan (Vibration Controlled Transient Elastography)    Date/Time: 7/20/2022 2:15 PM  Performed by: Lorie Álvarez PA-C  Authorized by: Lorie Álvarez PA-C     Diagnosis:  NAFLD    Probe:  XL    Universal Protocol: Patient's identity, procedure and site were verified, confirmatory pause was performed.  Discussed procedure including risks and potential complications.  Questions answered.  Patient verbalizes understanding and wishes to proceed with VCTE.     Procedure: After providing explanations of the procedure, patient was placed in the supine position with right arm in maximum abduction to allow optimal exposure of right lateral abdomen.  Patient was briefly assessed, Testing was performed in the mid-axillary location, 50Hz Shear Wave pulses were applied and the resulting Shear Wave and Propagation Speed detected with a 3.5 MHz ultrasonic signal, using the FibroScan probe, Skin to liver capsule distance and liver parenchyma were accessed during the entire examination with the FibroScan probe, Patient was instructed to breathe normally and to abstain from sudden movements during the procedure, allowing for random measurements of liver stiffness. At least 10 Shear Waves were produced, Individual measurements of each Shear Wave were calculated.  Patient tolerated the procedure well with no complications.  Meets discharge criteria as was dismissed.  Rates pain 0 out of 10.  Patient will follow up with ordering provider to review results.      Findings  Median liver stiffness score:  4.8  CAP Reading: dB/m:  265    IQR/med %:  19  Interpretation  Fibrosis interpretation is based on medial liver stiffness - Kilopascal (kPa).    Fibrosis Stage:  F 0-1  Steatosis interpretation is based on controlled attenuation parameter - (dB/m).    Steatosis Grade:  S2

## 2022-07-22 LAB — SMOOTH MUSCLE AB TITR SER IF: NORMAL {TITER}

## 2022-07-26 ENCOUNTER — TELEPHONE (OUTPATIENT)
Dept: HEPATOLOGY | Facility: CLINIC | Age: 37
End: 2022-07-26
Payer: COMMERCIAL

## 2022-07-26 DIAGNOSIS — R79.89 ABNORMAL LIVER FUNCTION TEST: Primary | ICD-10-CM

## 2022-07-26 DIAGNOSIS — E88.01 LOW PLASMA ALPHA-1 ANTITRYPSIN: ICD-10-CM

## 2022-07-26 NOTE — TELEPHONE ENCOUNTER
Prior serologic workup:   Lab Results   Component Value Date    SMOOTHMUSCAB Negative 1:40 07/20/2022    IGGSERUM 1464 07/20/2022    FERRITIN 251 07/20/2022    FESATURATED 31 07/20/2022    CERULOPLSM 19.0 07/20/2022    HEPBSAG Negative 04/05/2022    HEPCAB Negative 10/16/2020     Lab Results   Component Value Date    ALT 42 07/20/2022    AST 29 07/20/2022    ALKPHOS 38 (L) 07/20/2022    BILITOT 0.7 07/20/2022    ALBUMIN 4.5 07/20/2022    INR 1.1 07/20/2022     04/05/2022

## 2022-07-29 ENCOUNTER — TELEPHONE (OUTPATIENT)
Dept: HEPATOLOGY | Facility: CLINIC | Age: 37
End: 2022-07-29
Payer: COMMERCIAL

## 2022-07-29 NOTE — TELEPHONE ENCOUNTER
MA scheduled appointments and mailed out appointment reminders and appointment are available on portal.

## 2022-08-11 ENCOUNTER — TELEPHONE (OUTPATIENT)
Dept: GASTROENTEROLOGY | Facility: CLINIC | Age: 37
End: 2022-08-11
Payer: COMMERCIAL

## 2022-08-16 ENCOUNTER — TELEPHONE (OUTPATIENT)
Dept: HEPATOLOGY | Facility: CLINIC | Age: 37
End: 2022-08-16
Payer: COMMERCIAL

## 2022-08-16 NOTE — TELEPHONE ENCOUNTER
----- Message from Opal Watt sent at 8/16/2022  1:28 PM CDT -----  Regarding: lab Orders  Contact: Pt  Pt requesting lab Orders be Faxed to [email or web address] in [place] , [place]    Please call and advise    Phone [phone]    Fax- [phone]

## 2022-08-19 ENCOUNTER — PATIENT MESSAGE (OUTPATIENT)
Dept: GASTROENTEROLOGY | Facility: CLINIC | Age: 37
End: 2022-08-19
Payer: COMMERCIAL

## 2022-08-29 ENCOUNTER — TELEPHONE (OUTPATIENT)
Dept: GASTROENTEROLOGY | Facility: CLINIC | Age: 37
End: 2022-08-29
Payer: COMMERCIAL

## 2022-09-01 ENCOUNTER — PATIENT MESSAGE (OUTPATIENT)
Dept: GASTROENTEROLOGY | Facility: CLINIC | Age: 37
End: 2022-09-01
Payer: COMMERCIAL

## 2022-09-08 ENCOUNTER — OFFICE VISIT (OUTPATIENT)
Dept: GASTROENTEROLOGY | Facility: CLINIC | Age: 37
End: 2022-09-08
Payer: COMMERCIAL

## 2022-09-08 VITALS
WEIGHT: 212.19 LBS | DIASTOLIC BLOOD PRESSURE: 73 MMHG | BODY MASS INDEX: 28.12 KG/M2 | OXYGEN SATURATION: 99 % | HEART RATE: 68 BPM | SYSTOLIC BLOOD PRESSURE: 131 MMHG | HEIGHT: 73 IN | TEMPERATURE: 97 F

## 2022-09-08 DIAGNOSIS — K50.113 CROHN'S DISEASE OF LARGE INTESTINE WITH FISTULA: Primary | ICD-10-CM

## 2022-09-08 PROCEDURE — 99215 PR OFFICE/OUTPT VISIT, EST, LEVL V, 40-54 MIN: ICD-10-PCS | Mod: S$GLB,,, | Performed by: INTERNAL MEDICINE

## 2022-09-08 PROCEDURE — 99215 OFFICE O/P EST HI 40 MIN: CPT | Mod: S$GLB,,, | Performed by: INTERNAL MEDICINE

## 2022-09-08 PROCEDURE — 3075F SYST BP GE 130 - 139MM HG: CPT | Mod: CPTII,S$GLB,, | Performed by: INTERNAL MEDICINE

## 2022-09-08 PROCEDURE — 3008F PR BODY MASS INDEX (BMI) DOCUMENTED: ICD-10-PCS | Mod: CPTII,S$GLB,, | Performed by: INTERNAL MEDICINE

## 2022-09-08 PROCEDURE — 1159F PR MEDICATION LIST DOCUMENTED IN MEDICAL RECORD: ICD-10-PCS | Mod: CPTII,S$GLB,, | Performed by: INTERNAL MEDICINE

## 2022-09-08 PROCEDURE — 3078F PR MOST RECENT DIASTOLIC BLOOD PRESSURE < 80 MM HG: ICD-10-PCS | Mod: CPTII,S$GLB,, | Performed by: INTERNAL MEDICINE

## 2022-09-08 PROCEDURE — 1159F MED LIST DOCD IN RCRD: CPT | Mod: CPTII,S$GLB,, | Performed by: INTERNAL MEDICINE

## 2022-09-08 PROCEDURE — 3008F BODY MASS INDEX DOCD: CPT | Mod: CPTII,S$GLB,, | Performed by: INTERNAL MEDICINE

## 2022-09-08 PROCEDURE — 3078F DIAST BP <80 MM HG: CPT | Mod: CPTII,S$GLB,, | Performed by: INTERNAL MEDICINE

## 2022-09-08 PROCEDURE — 3075F PR MOST RECENT SYSTOLIC BLOOD PRESS GE 130-139MM HG: ICD-10-PCS | Mod: CPTII,S$GLB,, | Performed by: INTERNAL MEDICINE

## 2022-09-08 RX ORDER — ACETAMINOPHEN 500 MG
2000 TABLET ORAL DAILY
COMMUNITY

## 2022-09-08 RX ORDER — LANOLIN ALCOHOL/MO/W.PET/CERES
1000 CREAM (GRAM) TOPICAL DAILY
COMMUNITY

## 2022-09-08 NOTE — PROGRESS NOTES
"     [facility]             Inflammatory Bowel Disease         Follow-up  Note              TODAY'S VISIT DATE:  9/8/2022    Chief Complaint:   Chief Complaint   Patient presents with    Crohn's Disease       PCP: Primary Doctor No    Previous History:  Joss Matute is a 36 y.o. male with Crohn's disease (ileum, rectal ulcer, recurrent perianal fistula/abscess- 12/96, 3/2017, 12/2019, setons last removed 6/2020).  He began with symptoms of a perirectal abscess in December 1996 that was initially drained in the ED.  The abscess recurred in December 1997 and was associated with vomiting, weight loss, and abdominal pain.  His initial colonoscopy in 1998 (no actual record just reported in clinic note) showed moderately severe ileitis with biopsies of the right/transverse/left colon and rectum with mild nonspecific acute and chronic reactive changes.  The transverse colon polyp (likely removed) pathology showed it to be "edematous with mild glandular dysplasia."  At that time he was finally diagnosed with Crohn's disease but unsure of the exact location.  Initially he recalls being given prednisone for 4-5 months and pentasa for maintenance.  By 2002, age 17, he stopped all medications and did not have any GI follow up.  He had a hospital admission for a "flare" with dehydration in 2004 and again was not started on any medications.  He was seen in 2007 for RLQ abdominal cramping, increasing fatigue, and increased bowel frequency with blood and mucous.  He had a repeat colonoscopy in 2/2007 that showed patchy area of mucosa in the distal 4 cm of the TI with normal biopsies.  He had recurrent abscess/fistula in 2008 that spontaneously drained and was seen by CRS and did not wish to take any medications.  He reports intermittent abscess/perianal fistula from [id number] with chronic drainage that would cause he to intermittently seek treatment of I&D in the ER.  In 10/2016 he established care with Dr." Braydon when he had a recurrent perianal fistula who planned to start patient on humira after surgical intervention.  He was seen by Dr. Cason on 3/15/2017 and had an EUA with seton placement.  Colonoscopy 4/7/2017 with Dr. LEANDRO Spence showed showed perianal skin tag, seton intact and small rectal ulcer with biopsies consistent with patchy active colitis with features of chronic mucosal injury.  He started Humira on 5/1/2017.  Setons were removed by Dr. Cason in 7/2017.  He started oral MTX in 8/2017 for immunogenicity prevention.  Trough [facility] humira levels were 2.5 with ABs 57 (low) on 1/8/2018 so we planned to increase humira to 40 mg SC weekly and start oral MTX 12.5 mg PO weekly with daily folic acid.  He started humira 40 mg SC every 7 days on 2/6/2018.  Repeat trough [facility] Humira levels were 15 with low ABs of 30 on 5/17/2018.  He had elevated LFTs on 8/13/2018 so oral MTX was discontinued.  Colonoscopy on 10/10/2018 showed a small perianal skin tar, rectal fistula, other normal rectum, colon, and ileum with normal biopsies.  Trough [facility] Humira level 20 with low ABs 26 on 11/16/2018 on humira 40 mg SC every 7 days.  Patient was seen by Dr. Monterroso to assess his fistula and discuss repair on 3/13/2019.  MRI on 3/20/2019 ordered by Dr. Monterroso showed three perianal fistulas with 2 appeared to have blind endings and one extending into the skin surface at the right medial gluteal fold.  On 5/13/2019 patient had some extreme diarrhea and abdominal pain last for a few hours and then resolved.  On 12/29/2019 patient had perianal drainage and rectal pain and started on ciprofloxacin/Flagyl.  He had exam under anesthesia on 12/30/2019 which showed at which time perirectal abscess was drained and seton inserted.  On 1/2/2020 patient had Humira level of 13 with antibodies 29.  Colonoscopy on 3/10/2020 showed anal skin tags with seton int with a few areas mucosal scarring in the rectum and remainder of the colon and  terminal ileum normal.  MRE on 3/10/2020 showed some mild postcontrast enhancement of the terminal ileum likely due to chronic inflammatory change though no active disease and perirectal fistulas better visualized on the anal sphincter MRI noting interval seton placement. In 7/2020 he had elevated LFTs and after discontinuing traci man vitamin, amino acids and creatine this improved. He had some anal pain and was seen in clinic with Dr. Monterroso 9/2020 and prescribed course of augmentin and plans for MRI though hospitalized a day after the clinic visit due to worsening pain and CT showed perianal abscess with EUA showing fistula at level of dentate line with pus drained and seton placed and completed 2 week course of augmentin.  On 10/2020 trough humira levels 13/Abs neg on humira 40 mg SC q 7 days and 10/2021 trough humira levels 13/Abs neg with colonoscopy 10/2021 showing perianal fistula with seton, normal colon and terminal ileum and bx TI normal, rectum minimal focal architectural distortion and seton removed by Dr. Monterroso during procedure.     Interval History:  - current IBD meds: Humira 40 mg SC q weekly (started 5/1/17, 40 mg weekly 2/2018, LD 9/1, ND 9/8)  - 2 liquid to formed BMs/day, no blood, urgency or nocturnal BMs  - 7/2022 saw hepatology for abnl LFTs- fibroscan c/w F 0-1, steatosis grade S2, serologic workup negative, US and f/u 10/2022  - summer 2022 perianal irritation/pruritus near fistula site and mild pain resolved with no intervention  - NSAID use: No  - Narcotic use: No  - Alternative/complementary meds for IBD: No      Prior Pertinent Surgeries:   previous fistula repair Xs 2  3/15/2017: EUA with seton placement  6/25/20 Setons removed  9/17 Dr. Monterroso. anal fistula EUA with seton placement    Last pertinent Endoscopy/Imaging:  3/20/2019 MRI pelvis: Three perianal fistulous, two appear blind ending and one of which extends to the skin surface at the right medial gluteal fold.  No evidence for  abscess  3/10/20 MRE:  Mild postcontrast enhancement of the terminal ileum wall likely due to chronic inflammatory change without wall thickening or intraluminal stricture. Perirectal fistulas are better visualized on prior dedicated anal sphincter MRI noting interval seton placement.  9/17/20 CT A/P increased perianal soft tissue and partially organized fluid in right perirectal region (2.0 x 1.4 x 3.3cm)  10/12/21 colonoscopy:  perianal fistula with seton, normal colon and terminal ileum.  Biopsies of terminal ileum normal, rectum with focal minimal architectural distortion, seton removed by Dr. Monterroso during scope    Therapeutic Drug Monitoring Labs:  1/8/2018: Trough [facility] ADA Levels 2.5, ABs 57 (low) (humira 40 mg SC every 14 days)  5/17/2018: [facility] Trough Humira Level 15, ABs 30 (low) (humira 40 mg SC every 7 days)  11/16/2018: Trough [facility] Humira Level 20, ABs 26 (low) (humira 40 mg SC every 7 days)  1/2/20 trough humira level 13/Abs 29 (low) (humira 40 mg SC every 7 days)  10/16/20 trough humira levels 13/Abs neg (humira 40 mg SC every 7 days)  10/12/21 trough humira levels 13/Abs neg (humira 40 mg SC every 7 days)    Prior IBD Meds:  Prednisone  Pentasa  MTX 12.5 mg PO weekly (started 8/2017, stopped 8/13/2018 due to elevated LFTs)    Vaccinations:  Lab Results   Component Value Date    HEPBSAB Positive (A) 08/13/2018     Lab Results   Component Value Date    HEPAIGG Positive (A) 08/13/2018     Lab Results   Component Value Date    VARICELLAZOS 2.98 (H) 04/24/2017    VARICELLAINT Positive (A) 04/24/2017     Immunization History   Administered Date(s) Administered    COVID-19, MRNA, LN-S, PF (Pfizer) (Purple Cap) 03/25/2021, 04/16/2021, 05/26/2022    Hepatitis A / Hepatitis B 01/25/2018, 02/22/2018, 07/26/2018    Influenza 11/18/2017, 10/05/2018, 10/28/2019, 11/11/2020, 10/01/2021    Influenza (FLUBLOK) - Quadrivalent - Recombinant - PF *Preferred* (egg allergy) 11/11/2020    Influenza - Quadrivalent -  "MDCK - PF 11/18/2017    Influenza - Quadrivalent - PF *Preferred* (6 months and older) 10/04/2018, 10/28/2019    Pneumococcal Conjugate - 13 Valent 04/24/2017    Pneumococcal Polysaccharide - 23 Valent 07/25/2017    Tdap 04/24/2017    Zoster Recombinant 10/12/2021, 12/16/2021     Flu shot: yearly  COVID vaccine/booster: yearly  HPV: not sure, NA  Meningococcal:  not sure, NA  MMR (live vaccine): not sure, NA    Review of Systems   Constitutional:  Negative for chills, fever and weight loss.   HENT:          No oral ulcers, dysphagia, oral thrush   Eyes:  Negative for blurred vision, pain and redness.   Respiratory:  Negative for cough and shortness of breath.    Cardiovascular:  Negative for chest pain.   Gastrointestinal:  Negative for abdominal pain, heartburn, nausea and vomiting.   Genitourinary:  Negative for dysuria and hematuria.   Musculoskeletal:  Negative for back pain and joint pain.   Skin:  Negative for rash.   Psychiatric/Behavioral:  Negative for depression. The patient is not nervous/anxious and does not have insomnia.      All Medical History/Surgical History/Family History/Social History/Allergies have been reviewed and updated in EMR    Review of patient's allergies indicates:  No Known Allergies    Outpatient Medications Marked as Taking for the 9/8/22 encounter (Office Visit) with Bubba Spence MD   Medication Sig Dispense Refill    adalimumab (HUMIRA PEN) PnKt injection INJECT 1 PEN UNDER THE SKIN EVERY 7 DAYS 4 pen 5    cholecalciferol, vitamin D3, (VITAMIN D3) 50 mcg (2,000 unit) Cap capsule Take 2,000 Units by mouth once daily.      cyanocobalamin (VITAMIN B-12) 1000 MCG tablet Take 100 mcg by mouth once daily.      loperamide (IMODIUM) 2 mg capsule Take 2 mg by mouth once daily. Takes 4-5 times/week      UNABLE TO FIND OTC Men's daily Multi Vit     /73 (BP Location: Left arm, Patient Position: Sitting)   Pulse 68   Temp 97 °F (36.1 °C)   Ht 6' 1" (1.854 m)   Wt 96.3 kg (212 lb " 3.2 oz)   SpO2 99%   BMI 28.00 kg/m²     Physical Exam  Eyes:      Conjunctiva/sclera: Conjunctivae normal.   Cardiovascular:      Rate and Rhythm: Normal rate and regular rhythm.      Heart sounds: Normal heart sounds. No murmur heard.  Pulmonary:      Effort: Pulmonary effort is normal.      Breath sounds: Normal breath sounds.   Abdominal:      General: Bowel sounds are normal. There is no distension.      Palpations: Abdomen is soft.      Tenderness: There is no abdominal tenderness.   Genitourinary:     Comments: Perianal small skin tags, fistula appears closed with no drainage, no abscess  Musculoskeletal:         General: No swelling.      Cervical back: Neck supple.      Right lower leg: No edema.      Left lower leg: No edema.   Skin:     Findings: No rash.   Psychiatric:         Mood and Affect: Affect normal.       Lab Results   Component Value Date    CRP 0.3 09/16/2020     Lab Results   Component Value Date    HEPBSAG Negative 04/05/2022    HEPBCAB Negative 04/05/2022     Lab Results   Component Value Date    TBGOLDPLUS Negative 10/12/2021     Lab Results   Component Value Date    RRXHISNP92FB 45 09/08/2022    FDJABGOQ59 931 09/08/2022     Lab Results   Component Value Date    WBC 6.73 09/08/2022    HGB 14.3 09/08/2022    HCT 42.5 09/08/2022    MCV 87 09/08/2022     09/08/2022     Lab Results   Component Value Date    CREATININE 0.9 09/08/2022    ALBUMIN 4.7 09/08/2022    BILITOT 0.7 09/08/2022    ALKPHOS 35 (L) 09/08/2022    AST 19 09/08/2022    ALT 34 09/08/2022     Assessment/Plan:  Joss Matute is a 36 y.o. male with Crohn's disease (ileum, rectal ulcer, recurrent perianal fistula/abscess- 12/96, 3/2017, 12/2019, setons last removed 6/2020) who continues on Humira 40 mg SC weekly and overall he takes some imodium few times/week. Since seton removal 10/2021 no recurrent abscess and fistula almost completely closed.     # Crohn's disease (ileum, rectal ulcer, recurrent perianal  fistula/abscess- 12/1996, 3/2017, 12/2019, 9/2020)  - perianal fistula- sees Dr. Monterroso, leakage and taking imodium but no recurrent abscess since seton removal 10/2021  - continue Humira 40 mg SC q weekly  - colonoscopy - 4/2023- to be scheduled at next visit   - vitamin B12 (10/2021 504)- continue vit B12 1000 mcg daily, repeat vit B12 today  - ex-smoker:  Quit fall 2015, discussed risks of smoking and CD with patient in past  - drug monitoring labs: CBC/CMP q 6 months (3/2023), TPMT (normal 4/2017), TB quantiferon (today), Hep B testing (today)  - TDM:  Trough humira levels/abs 3/2023    # Abnormal LFTs:  - followed by hepatology and workup in progress  - fibroscan- some steatosis, US to be done, serologic workup neg, f/u appt 10/2022    # IBD specific health maintenance:  CRC Risk: personal h/o of sporadic adenoma in 1998, proctitis/ileal disease with symptom onset in 1998; surveillance every 5 years   Skin exam yearly - never had skin exam and pt will establish dermatologist locally in [place]  Risk for osteopenia/osteoporosis-none  Vitamin D (10/2021 28)- continue vit D 2000 IU/d, MVI daily, repeat vitamin D level today  Vaccines: no live vaccines, covid booster recommended    Follow up: 6 mos    Total time: 40 min    Bubba Spence MD   Department of Gastroenterology  Medical Director, Inflammatory Bowel Disease

## 2022-09-08 NOTE — PROGRESS NOTES
IBD PATIENT INTAKE:    COVID symptoms in the last 7 days (runny nose, sore throat, congestion, cough, fever): No  PCP: Primary Doctor No  If not PCP-  number given to establish [phone]: Yes    ALLERGIES REVIEWED:  Yes    CHIEF COMPLAINT:    Chief Complaint   Patient presents with    Crohn's Disease       VITAL SIGNS:  There were no vitals taken for this visit.     Change in medical, surgical, family or social history: yes, stop drink alcohol     IBD THERAPY (name, dose/frequency):  Humira Q 7 days   Last dose:  9/1/22    Next dose:  9/8/22  Infusion/Pharmacy: [facility]    NSAIDs (aspirin, ibuprofen-advil or motrin, naproxen-aleve, diclofenac-voltaren, BC powder, excedrin, goodies): No    Alternative/Complementary Medications (i.e. probiotics, turmeric, fish oil, aloe vera):      no  Name/dose:  none    Vitamins:   Vit D:  2000 IU daily      Vit B-12:  1000 mcg daily    Folic Acid: no       Calcium: no     Iron:  no      MVI: yes    Antibiotics (past 30 Days):  no  If yes   Indication:  Name of antibiotic:  Completion date:     REVIEWED MEDICATION LIST RECONCILED INCLUDING ABOVE MEDS:  yes

## 2022-09-15 ENCOUNTER — TELEPHONE (OUTPATIENT)
Dept: HEPATOLOGY | Facility: CLINIC | Age: 37
End: 2022-09-15
Payer: COMMERCIAL

## 2022-09-15 DIAGNOSIS — Z14.8 CARRIER OF ALPHA-1-ANTITRYPSIN DEFICIENCY: Primary | ICD-10-CM

## 2022-09-15 NOTE — TELEPHONE ENCOUNTER
A1at levels are low with MS phenotype.   Discussed with patient over the phone, typically recommend pulm referral.  He was a smoker in the past   Recommend pulmonology visit some point in the future, will discuss results in greater detail at upcoming hepatology appointment.

## 2022-09-20 NOTE — PATIENT INSTRUCTIONS
Instructions:  - continue humira 40 mg SC every 7 days, due 2/15/2019  - continue vitamin D3 2000 IU daily  - labs due 5/2019  - set up appointment with [facility] for fistula evaluation, Dr. Monterroso  - Avoid all NSAIDs (Advil, Ibuprofen, Motrin, Aspirin, Naprosyn, Aleve)  - Yearly Eye exam due now  - Yearly Skin exam--wear sun block and hats due now  - Use antibiotics with caution  - For Dental Procedures, use antibiotics only if absolutely necessary  - Please make sure you establish care with a PCP  - If you have to take antibiotics for any infection, please take a 2 week course of OTC Florastor 1 capsule twice daily probiotic after completion of the antibiotic course  - Vaccines recommended:  Flu shot yearly  Tetanus every 10 years  Shingrix series  - Follow up with JAC Alvarez in May 2019         79.8

## 2022-09-22 ENCOUNTER — PATIENT MESSAGE (OUTPATIENT)
Dept: GASTROENTEROLOGY | Facility: CLINIC | Age: 37
End: 2022-09-22
Payer: COMMERCIAL

## 2022-10-01 ENCOUNTER — PATIENT MESSAGE (OUTPATIENT)
Dept: HEPATOLOGY | Facility: CLINIC | Age: 37
End: 2022-10-01
Payer: COMMERCIAL

## 2022-10-18 ENCOUNTER — HOSPITAL ENCOUNTER (OUTPATIENT)
Dept: RADIOLOGY | Facility: HOSPITAL | Age: 37
Discharge: HOME | End: 2022-10-18
Attending: PHYSICIAN ASSISTANT
Payer: COMMERCIAL

## 2022-10-18 DIAGNOSIS — R79.89 ABNORMAL LIVER FUNCTION TEST: ICD-10-CM

## 2022-10-18 PROCEDURE — 76705 US ABDOMEN LIMITED: ICD-10-PCS | Mod: 26,,, | Performed by: RADIOLOGY

## 2022-10-18 PROCEDURE — 76705 ECHO EXAM OF ABDOMEN: CPT | Mod: 26,,, | Performed by: RADIOLOGY

## 2022-10-18 PROCEDURE — 76705 ECHO EXAM OF ABDOMEN: CPT | Mod: TC

## 2022-10-21 ENCOUNTER — OFFICE VISIT (OUTPATIENT)
Dept: HEPATOLOGY | Facility: CLINIC | Age: 37
End: 2022-10-21
Payer: COMMERCIAL

## 2022-10-21 ENCOUNTER — TELEPHONE (OUTPATIENT)
Dept: HEPATOLOGY | Facility: CLINIC | Age: 37
End: 2022-10-21

## 2022-10-21 DIAGNOSIS — K50.113 CROHN'S DISEASE OF LARGE INTESTINE WITH FISTULA: ICD-10-CM

## 2022-10-21 DIAGNOSIS — R74.8 ELEVATED LIVER ENZYMES: ICD-10-CM

## 2022-10-21 DIAGNOSIS — Z14.8 CARRIER OF ALPHA-1-ANTITRYPSIN DEFICIENCY: ICD-10-CM

## 2022-10-21 DIAGNOSIS — K76.0 FATTY LIVER: Primary | ICD-10-CM

## 2022-10-21 DIAGNOSIS — E88.01 LOW PLASMA ALPHA-1 ANTITRYPSIN: ICD-10-CM

## 2022-10-21 PROCEDURE — 1159F PR MEDICATION LIST DOCUMENTED IN MEDICAL RECORD: ICD-10-PCS | Mod: CPTII,95,, | Performed by: PHYSICIAN ASSISTANT

## 2022-10-21 PROCEDURE — 1159F MED LIST DOCD IN RCRD: CPT | Mod: CPTII,95,, | Performed by: PHYSICIAN ASSISTANT

## 2022-10-21 PROCEDURE — 1160F PR REVIEW ALL MEDS BY PRESCRIBER/CLIN PHARMACIST DOCUMENTED: ICD-10-PCS | Mod: CPTII,95,, | Performed by: PHYSICIAN ASSISTANT

## 2022-10-21 PROCEDURE — 99214 OFFICE O/P EST MOD 30 MIN: CPT | Mod: 95,,, | Performed by: PHYSICIAN ASSISTANT

## 2022-10-21 PROCEDURE — 1160F RVW MEDS BY RX/DR IN RCRD: CPT | Mod: CPTII,95,, | Performed by: PHYSICIAN ASSISTANT

## 2022-10-21 PROCEDURE — 99214 PR OFFICE/OUTPT VISIT, EST, LEVL IV, 30-39 MIN: ICD-10-PCS | Mod: 95,,, | Performed by: PHYSICIAN ASSISTANT

## 2022-10-21 NOTE — PATIENT INSTRUCTIONS
Reviewed work-up with patient: suspect mild fatty liver. Liver enzymes wnl with exercise and diet.   Recommend minimization of alcohol use.   Fibroscan F0-1, S2. Ultrasound normal. No biliary findings.   A1at mildly low with MS phenotype. Recommend eventual pulmonary *lung doctor* evaluation, PFTs, prior smoker.   Dr Spence can ctm enzymes and return to us PRN if needed.  Follow-up in 2 years with ultrasound and Fibroscan prior.      FATTY LIVER RECOMMENDATIONS:    There is no FDA approved therapy for non-alcoholic fatty liver disease (NAFLD); therefore, lifestyle changes are important:  1. Weight loss goal of 10 lbs  2. Mediterranean diet is recommended that focuses on low-carb, low-sugar, and low-processed foods.  3. Exercise, 5 days per week, 30 minutes per day, as tolerated  4. Recommend good control of cholesterol, blood pressure, blood sugar levels (as these are risk factors for fatty liver). Cholesterol lowering medications including statins are typically safe and beneficial (even if liver enzymes are elevated).    5. Limit alcohol consumption, no more than 1 serving(s) of alcohol in any day (1 serving is 5 ounces of wine, 12 ounces of beer, or 1.5 ounces of liquor). Do not recommend daily alcohol use.        In some people, fatty liver can progress to steatohepatitis (inflammatory fatty liver) and possibly to cirrhosis, putting one at increased risk for liver cancer and liver failure in the future. Lifestyle changes can help to decrease this risk.     If interested in seeing a dietician to create a weight loss plan, contact the dietician team at [facility]: [email or web address].  You can also call to schedule a consult with a dietician: [phone]. *Virtual visits are available with one of our dieticians.     If you have diabetes or high blood pressure, you can meet with a Health  through [facility]. Let us know if you are interested in a referral.     Ask about  our fatty liver/MORA clinical trials if you have fibrosis / scar tissue related to fatty liver.    *If statins are being considered for HLD, statins are safe in patients with liver disease. Statins can be used to treat dyslipidemia in patients with both NAFLD and MORA.      FATTY LIVER DIET TIPS:    ADD OLIVE OIL. I recommend olive oil daily (in salads or when cooking)  ADD COFFEE. Black coffee has also been found to be potentially beneficial (skip the sweets and creamer). Add 1-2 cups per day,  if you are able to tolerate. Decaf is fine.  BE MINDFUL OF CARBS AND ADDED SUGARS. Try to limit your carb intake to LESS than 30-45 grams of carbs with a meal or LESS than 5-10 grams with any snack (total of any snack foods eaten during that time).   KEEP A FOOD DIARY. Use MyFitness Pal  OR LOSE IT lakisha to add up your carbs through the day - the most successful folks on weight loss journey TRACK their progress and food.  LIMIT WHAT YOU ARE DRINKING. Do NOT drink any beverages with calories or carbs (this can lead to high blood sugar and weight gain). Also, some of our patients have been very successful with weight loss using the pre made/planned meal planning services that limit calories and portion size (one example is Sensible Meals but there are many out there). Unsweetened black or green tea is fine! Hibiscus tea is also great and refreshing, especially iced.     Tips for low/moderate carbohydrate diet:  3 meals a day made up of the following:  -- Green vegetables, tomatoes, mushrooms, spaghetti squash, cauliflower, meat, poultry, seafood, eggs   -- Beans (1-1.5 cups) or nuts (1/4 cup): can have 1 x a day.  -- Greek yogurt and fermented foods like kefir, kimchi, saurkraut (be careful if you have swelling issues as lots of salt can worsen swelling or blood pressure)  -- Dressings, seasonings, condiments, etc should have less than 2 g sugars.   -- 1-2 servings of citrus fruits, berries, pineapple or melon a day (1/2  cup)  -- Avoid fried foods  -- Avoid grains, rice, pasta, potatoes, bread, corn, peas, oatmeal, grits, tortillas, crackers, chips  -- No soda, sweet tea, juices or lemonade  -- Use olive/avocado oil rather than vegetable oils or butter.        ADDITIONAL RESOURCES:  Websites with information about fatty liver and inflammation related to fatty liver (MORA): www.nashtruth.[employer] and www.nashactually.com   [facility]: Non-Alcoholic Fatty Liver Disease (NAFLD)    Facebook support group with tips and recipes:   Non-Alcoholic Fatty Liver Disease (NAFLD) Diet & Nutrition Support     Try [email or web address].[employer] for recipes.

## 2022-10-21 NOTE — PROGRESS NOTES
The patient location is: [place]  The chief complaint leading to consultation is: elevated enzymes, fatty liver    Visit type: audiovisual    Face to Face time with patient: 15  30 minutes of total time spent on the encounter, which includes face to face time and non-face to face time preparing to see the patient (eg, review of tests), Obtaining and/or reviewing separately obtained history, Documenting clinical information in the electronic or other health record, Independently interpreting results (not separately reported) and communicating results to the patient/family/caregiver, or Care coordination (not separately reported).         Each patient to whom he or she provides medical services by telemedicine is:  (1) informed of the relationship between the physician and patient and the respective role of any other health care provider with respect to management of the patient; and (2) notified that he or she may decline to receive medical services by telemedicine and may withdraw from such care at any time.    Notes:       Hepatology Consultation: [facility]    EST PATIENT  PCP: Primary Doctor No    Subjective      Mr. Matute is a 36 y.o. male with PMH as listed below who presents to clinic for a history of elevated liver enzymes, predominantly hepatocellular, in the context of Crohn's colitis. He has also had 1 instance of elevated Tbili 1.3 on 10/12/21. His labs upon recheck normalized. He is negative for viral hepatitis B & C.     Lab Results   Component Value Date    ALT 34 09/08/2022    AST 19 09/08/2022    ALKPHOS 35 (L) 09/08/2022    BILITOT 0.7 09/08/2022    ALBUMIN 4.7 09/08/2022    INR 1.0 09/08/2022     09/08/2022       Initial history 07/20/2022:  Joss Matute arrives today alone.    On maintanence Humira for Crohn's, F/b Dr Spence.    Reports that he feels well. He is well-appearingDenies symptoms of hepatic decompensation including jaundice, abdominal  distention or lower extremity swelling, hematemesis, or periods of confusion suggestive of hepatic encephalopathy.    Vaccinated against HAV/HBV.    Prior elevation of liver enzymes intermittent since at least 2018.    He reports a history of heavy alcohol use, at least 3-6 beers per day, stopped entirely 2 months ago.    Currently on a healthy diet - low carb, high protein, more frequent meals, he has lost 20 lbs.   He is going to the gym and exercising with weight lifting. Once a week will take BCAAs. He drinks water and black coffee. No alcohol whatsoever.      Supplements: mens multivitamin daily BCAA supplement pre-workout once a week (lift weights)  ETOH: haven't drank in over 2 months moderate - prior to this 3-6 beers per night 18 years 17 years   Smoking: denies   Illicit substances: denies   Social: lives with wife and baby   Prior liver biopsy: denies  Prior liver disease: denies   Hepatitis vaccination: yes   Liver-related family medical history: denies fhx liver cancer, cirrhosis, hepatitis.    Interval history 10/21/2022:  Joss Matute arrives today alone.   Since our last visit, doing well.   No changes but has continued exercising. Feeling well.   Denies family hsitory of lung disease. He was a prior smoker and reports no pulm symptoms: no SOB, tightness etc   Denies symptoms of hepatic decompensation including jaundice, abdominal distention or lower extremity swelling, hematemesis, melena, or periods of confusion suggestive of hepatic encephalopathy.  Weight loss          PMH Joss Alvarez has a past medical history of Crohn's disease, Ex-smoker (1/24/2018), Immunosuppressed status (1/27/2020), Penile venereal warts (1/25/2018), Vitamin B12 deficiency, and Vitamin D deficiency (6/26/2017).   PSXH Joss Alvarez has a past surgical history that includes Colonoscopy; Colonoscopy (N/A, 4/7/2017); Abscess drainage; Anal fistulotomy; Upper gastrointestinal endoscopy; Colonoscopy (N/A, 10/10/2018); Examination under  anesthesia (N/A, 12/30/2019); Incision and drainage of abscess (12/30/2019); Incision of perirectal abscess (12/30/2019); Insertion of seton stitch (N/A, 12/30/2019); Colonoscopy (N/A, 3/10/2020); Examination under anesthesia (N/A, 9/18/2020); Insertion of seton stitch (9/18/2020); Incision and drainage of abscess (9/18/2020); and Colonoscopy (N/A, 10/12/2021).   CHELA Avlarez's family history includes ALS in his paternal grandfather; No Known Problems in his brother, brother, father, mother, sister, and sister.   SH Joss Alvarez reports that he quit smoking about 6 years ago. His smoking use included cigarettes. He has never used smokeless tobacco. He reports that he does not currently use alcohol. He reports that he does not use drugs.   BLAIR Alvarez has No Known Allergies.   MED Joss Alvarez has a current medication list which includes the following prescription(s): humira pen, cholecalciferol (vitamin d3), cyanocobalamin, loperamide, and UNABLE TO FIND.     ROS:   As per hpi    Objective     Physical exam is limited by video visit:   Friendly man , in no acute distress; alert and oriented to person, place and time  VITALS: There were no vitals taken for this visit.   SKIN/EYES: No obvious jaundice or yellowing of the eyes.   HENT: Normocephalic, without obvious abnormality.   NECK: Supple.   CARDIOVASCULAR: RONNELL on video visit  RESPIRATORY: Normal respiratory effort.   GI: RONNELL hepatosplenomegaly  PSYCH: Thought and speech pattern appropriate.     DIAGNOSTICS    Lab Results   Component Value Date    ALT 34 09/08/2022    AST 19 09/08/2022    ALKPHOS 35 (L) 09/08/2022    BILITOT 0.7 09/08/2022    ALBUMIN 4.7 09/08/2022    INR 1.0 09/08/2022     09/08/2022     No results found for: AFP    In relation to metabolic risk factors:   Lab Results   Component Value Date    CHOL 187 10/16/2020     There is no height or weight on file to calculate BMI.      Prior serologic workup:   Lab Results   Component Value Date    SMOOTHMUSCAB  Negative 1:40 07/20/2022    IGGSERUM 1464 07/20/2022    FERRITIN 251 07/20/2022    FESATURATED 31 07/20/2022    NAPHU1ILGGZU SEE BELOW 09/08/2022    SYNRY4MUZWCB 92 (L) 09/08/2022    CERULOPLSM 19.0 07/20/2022    HEPBSAG Negative 04/05/2022    HEPCAB Negative 10/16/2020       Imaging:  US Abdomen Limited  Narrative: EXAMINATION:  US ABDOMEN LIMITED    CLINICAL HISTORY:  Other specified abnormal findings of blood chemistry    TECHNIQUE:  Limited ultrasound of the right upper quadrant of the abdomen (including pancreas, liver, gallbladder, common bile duct, and right kidney) was performed.    COMPARISON:  None.    FINDINGS:  Liver: Normal in size, measuring 15.4 cm. Homogeneous echotexture. No focal hepatic lesions.    Gallbladder: No calculi, wall thickening, or pericholecystic fluid.  No sonographic Retana's sign.    Pancreas: The visualized portions of the pancreas are normal in size and echogenicity.    Biliary system: The common duct is not dilated, measuring 3.5 mm.  No intrahepatic ductal dilatation.    Spleen: Normal in size and echogenicity measuring 10.2 cm.    IVC identified.    Miscellaneous: No upper abdominal ascites.  Impression: No significant sonographic abnormality.    Electronically signed by: Ezra Wall  Date:    10/18/2022  Time:    13:13      Findings Fibroscan 10/21/2022  Median liver stiffness score:  4.8  CAP Reading: dB/m:  265  IQR/med %:  19  Interpretation  Fibrosis interpretation is based on medial liver stiffness - Kilopascal (kPa).  Fibrosis Stage:  F 0-1  Steatosis interpretation is based on controlled attenuation parameter - (dB/m).  Steatosis Grade:  S2       Assessment/Plan     36 y.o. male with:    1. Abnormal liver function test  - aberrant, intermittent  - normalized  - with background moderate steatosis  - a1at levels mildly low w/ MS phenotype - recommend pulm referral non urgent     2. Crohn's disease of large intestine with fistula    3. Elevated liver enzymes  -  intermittent ALT>AST    4. BMI 29.0-29.9,adult  - risk factor for fatty liver, in conjunction     5. Elevated bilirubin  - resolved    6. Low alpha 1, carrier of a1at MS   - ref to pulm       Orders Placed This Encounter   Procedures    Ambulatory referral/consult to Pulmonology       Reviewed work-up with patient: suspect mild fatty liver. Liver enzymes wnl with exercise and diet. Recommend minimization of alcohol use. Fibroscan F0-1, S2. Ultrasound normal. No biliary findings.   A1at mildly low with MS phenotype. Recommend eventual pulm evaluation, PFTs, prior smoker.   Dr Spence can ctm enzymes and return to us PRN if needed.  Follow-up in 2 years with ultrasound and Fibroscan prior.        I spent 25 minutes on the day of this encounter preparing for, evaluating, treating, and managing this patient.     Thank you for allowing me to participate in the care of ARTUR LyonsC  Hepatology & Liver Transplant

## 2022-11-22 DIAGNOSIS — K50.813 CROHN'S DISEASE OF BOTH SMALL AND LARGE INTESTINE WITH FISTULA: ICD-10-CM

## 2022-11-22 RX ORDER — ADALIMUMAB 40MG/0.8ML
KIT SUBCUTANEOUS
Qty: 4 PEN | Refills: 4 | Status: SHIPPED | OUTPATIENT
Start: 2022-11-22 | End: 2022-12-08 | Stop reason: SDUPTHER

## 2022-11-22 NOTE — TELEPHONE ENCOUNTER
Refill request for Humira     Allergies reviewed     Drug Monitoring labs:  CBC and CMP every 6 months  TB Gold, HBsAG and HBcAb yearly     Lab Results   Component Value Date    HEPBSAG Negative 04/05/2022    HEPBCAB Negative 04/05/2022     Lab Results   Component Value Date    TBGOLDPLUS Negative 09/08/2022     Lab Results   Component Value Date    QMOZRNEI66GT 45 09/08/2022    TKDGGIEK04 931 09/08/2022     Lab Results   Component Value Date    WBC 6.73 09/08/2022    HGB 14.3 09/08/2022    HCT 42.5 09/08/2022    MCV 87 09/08/2022     09/08/2022     Lab Results   Component Value Date    CREATININE 0.9 09/08/2022    ALBUMIN 4.7 09/08/2022    BILITOT 0.7 09/08/2022    ALKPHOS 35 (L) 09/08/2022    AST 19 09/08/2022    ALT 34 09/08/2022       Labs due:  3/2023    Next appt: 3/8/2023    RX pended

## 2022-12-01 NOTE — TELEPHONE ENCOUNTER
BI Complete Humira  Per [facility]    OOP 4000    ;   Deductible    none  Copay 60  OSP OON - OPtum SPP mary MORSE approved    Called patient for FA information         non-distended

## 2022-12-09 RX ORDER — ADALIMUMAB 40MG/0.8ML
40 KIT SUBCUTANEOUS
Qty: 4 PEN | Refills: 4 | Status: SHIPPED | OUTPATIENT
Start: 2022-12-09 | End: 2023-04-10 | Stop reason: SDUPTHER

## 2022-12-23 ENCOUNTER — PATIENT MESSAGE (OUTPATIENT)
Dept: GASTROENTEROLOGY | Facility: CLINIC | Age: 37
End: 2022-12-23
Payer: COMMERCIAL

## 2023-02-03 NOTE — TELEPHONE ENCOUNTER
70 yo female with hx of COPD continues to smoke, HTN, Depression presents with 3 day hx of productive cough yellow green, fever, urinary symptoms. Stating she has been coughing SOB, wheezing, No N/V/D.      Pt is Full code SDM is her daughter   Pt is see and examined via Telemed. Pt is in [facility]. I am in [place], [place]. Nursing staff assisted with evaluation. Software is Audio/ Video  Pt is being admitted as an observation for further evaluation and treatment   Spoke to eMe, requested that Folic Acid Rx be cancelled.

## 2023-03-08 ENCOUNTER — OFFICE VISIT (OUTPATIENT)
Dept: GASTROENTEROLOGY | Facility: CLINIC | Age: 38
End: 2023-03-08
Payer: COMMERCIAL

## 2023-03-08 ENCOUNTER — TELEPHONE (OUTPATIENT)
Dept: ENDOSCOPY | Facility: HOSPITAL | Age: 38
End: 2023-03-08
Payer: COMMERCIAL

## 2023-03-08 ENCOUNTER — TELEPHONE (OUTPATIENT)
Dept: GASTROENTEROLOGY | Facility: CLINIC | Age: 38
End: 2023-03-08

## 2023-03-08 DIAGNOSIS — K50.813 CROHN'S DISEASE OF BOTH SMALL AND LARGE INTESTINE WITH FISTULA: Primary | ICD-10-CM

## 2023-03-08 PROCEDURE — 1160F PR REVIEW ALL MEDS BY PRESCRIBER/CLIN PHARMACIST DOCUMENTED: ICD-10-PCS | Mod: CPTII,95,, | Performed by: INTERNAL MEDICINE

## 2023-03-08 PROCEDURE — 1159F PR MEDICATION LIST DOCUMENTED IN MEDICAL RECORD: ICD-10-PCS | Mod: CPTII,95,, | Performed by: INTERNAL MEDICINE

## 2023-03-08 PROCEDURE — 1160F RVW MEDS BY RX/DR IN RCRD: CPT | Mod: CPTII,95,, | Performed by: INTERNAL MEDICINE

## 2023-03-08 PROCEDURE — 99215 PR OFFICE/OUTPT VISIT, EST, LEVL V, 40-54 MIN: ICD-10-PCS | Mod: 95,,, | Performed by: INTERNAL MEDICINE

## 2023-03-08 PROCEDURE — 1159F MED LIST DOCD IN RCRD: CPT | Mod: CPTII,95,, | Performed by: INTERNAL MEDICINE

## 2023-03-08 PROCEDURE — 99215 OFFICE O/P EST HI 40 MIN: CPT | Mod: 95,,, | Performed by: INTERNAL MEDICINE

## 2023-03-08 NOTE — PROGRESS NOTES
"     [facility]             Inflammatory Bowel Disease         Follow-up  Note              TODAY'S VISIT DATE:  3/8/2023    Chief Complaint:   Chief Complaint   Patient presents with    Crohn's Disease     PCP: Primary Doctor No    Previous History:  Joss Matute is a 37 y.o. male with Crohn's disease (ileum, rectal ulcer, recurrent perianal fistula/abscess- 12/96, 3/2017, 12/2019, setons last removed 6/2020).  He began with symptoms of a perirectal abscess in December 1996 that was initially drained in the ED.  The abscess recurred in December 1997 and was associated with vomiting, weight loss, and abdominal pain.  His initial colonoscopy in 1998 (no actual record just reported in clinic note) showed moderately severe ileitis with biopsies of the right/transverse/left colon and rectum with mild nonspecific acute and chronic reactive changes.  The transverse colon polyp (likely removed) pathology showed it to be "edematous with mild glandular dysplasia."  At that time he was finally diagnosed with Crohn's disease but unsure of the exact location.  Initially he recalls being given prednisone for 4-5 months and pentasa for maintenance.  By 2002, age 17, he stopped all medications and did not have any GI follow up.  He had a hospital admission for a "flare" with dehydration in 2004 and again was not started on any medications.  He was seen in 2007 for RLQ abdominal cramping, increasing fatigue, and increased bowel frequency with blood and mucous.  He had a repeat colonoscopy in 2/2007 that showed patchy area of mucosa in the distal 4 cm of the TI with normal biopsies.  He had recurrent abscess/fistula in 2008 that spontaneously drained and was seen by CRS and did not wish to take any medications.  He reports intermittent abscess/perianal fistula from [id number] with chronic drainage that would cause he to intermittently seek treatment of I&D in the ER.  In 10/2016 he established care with Dr." Braydon when he had a recurrent perianal fistula who planned to start patient on humira after surgical intervention.  He was seen by Dr. Cason on 3/15/2017 and had an EUA with seton placement.  Colonoscopy 4/7/2017 with Dr. LEANDRO Spence showed showed perianal skin tag, seton intact and small rectal ulcer with biopsies consistent with patchy active colitis with features of chronic mucosal injury.  He started Humira on 5/1/2017.  Setons were removed by Dr. Cason in 7/2017.  He started oral MTX in 8/2017 for immunogenicity prevention.  Trough [facility] humira levels were 2.5 with ABs 57 (low) on 1/8/2018 so we planned to increase humira to 40 mg SC weekly and start oral MTX 12.5 mg PO weekly with daily folic acid.  He started humira 40 mg SC every 7 days on 2/6/2018.  Repeat trough [facility] Humira levels were 15 with low ABs of 30 on 5/17/2018.  He had elevated LFTs on 8/13/2018 so oral MTX was discontinued.  Colonoscopy on 10/10/2018 showed a small perianal skin tar, rectal fistula, other normal rectum, colon, and ileum with normal biopsies.  Trough [facility] Humira level 20 with low ABs 26 on 11/16/2018 on humira 40 mg SC every 7 days.  Patient was seen by Dr. Monterroso to assess his fistula and discuss repair on 3/13/2019.  MRI on 3/20/2019 ordered by Dr. Monterroso showed three perianal fistulas with 2 appeared to have blind endings and one extending into the skin surface at the right medial gluteal fold.  On 5/13/2019 patient had some extreme diarrhea and abdominal pain last for a few hours and then resolved.  On 12/29/2019 patient had perianal drainage and rectal pain and started on ciprofloxacin/Flagyl.  He had exam under anesthesia on 12/30/2019 which showed at which time perirectal abscess was drained and seton inserted.  On 1/2/2020 patient had Humira level of 13 with antibodies 29.  Colonoscopy on 3/10/2020 showed anal skin tags with seton int with a few areas mucosal scarring in the rectum and remainder of the colon and  terminal ileum normal.  MRE on 3/10/2020 showed some mild postcontrast enhancement of the terminal ileum likely due to chronic inflammatory change though no active disease and perirectal fistulas better visualized on the anal sphincter MRI noting interval seton placement. In 7/2020 he had elevated LFTs and after discontinuing traci man vitamin, amino acids and creatine this improved. He had some anal pain and was seen in clinic with Dr. Monterroso 9/2020 and prescribed course of augmentin and plans for MRI though hospitalized a day after the clinic visit due to worsening pain and CT showed perianal abscess with EUA showing fistula at level of dentate line with pus drained and seton placed and completed 2 week course of augmentin.  On 10/2020 trough humira levels 13/Abs neg on humira 40 mg SC q 7 days and 10/2021 trough humira levels 13/Abs neg with colonoscopy 10/2021 showing perianal fistula with seton, normal colon and terminal ileum and bx TI normal, rectum minimal focal architectural distortion and seton removed by Dr. Monterroso during procedure.     Interval History:  - current IBD meds: Humira 40 mg SC q weekly (started 5/1/17, 40 mg weekly 2/2018, LD 3/2, ND 3/9)  - 2 liquid to formed BMs/day, no blood, urgency or nocturnal BMs  - takes imodium twice a week   - 9/8/22 trough humira levels 12/Abs neg  - 12/2022 covid infection and had to miss 2 doses of humira  - 12/2022- ago perianal pressure/irritation/itching/slight pain, few days later some drainage and no recurrence, prior to that had this symptoms summer 2022  - last saw hepatology 10/2022 and next f/u due 10/2024 at which time US and fibroscan recommended- suspect mild fatty liver disease, no longer drinks alcohol, A1at mildly low with MS phenotype. Recommend eventual pulm evaluation, PFTs, prior smoker.  - summer 2022 perianal irritation/pruritus near fistula site and mild pain resolved with no intervention  - NSAID use: No  - Narcotic use: No  -  Alternative/complementary meds for IBD: No      Prior Pertinent Surgeries:   previous fistula repair Xs 2  3/15/2017: EUA with seton placement  6/25/20 Setons removed  9/17 Dr. Monterroso. anal fistula EUA with seton placement    Last pertinent Endoscopy/Imaging:  3/20/2019 MRI pelvis: Three perianal fistulous, two appear blind ending and one of which extends to the skin surface at the right medial gluteal fold.  No evidence for abscess  3/10/20 MRE:  Mild postcontrast enhancement of the terminal ileum wall likely due to chronic inflammatory change without wall thickening or intraluminal stricture. Perirectal fistulas are better visualized on prior dedicated anal sphincter MRI noting interval seton placement.  9/17/20 CT A/P increased perianal soft tissue and partially organized fluid in right perirectal region (2.0 x 1.4 x 3.3cm)  10/12/21 colonoscopy:  perianal fistula with seton, normal colon and terminal ileum.  Biopsies of terminal ileum normal, rectum with focal minimal architectural distortion, seton removed by Dr. Monterroso during scope    Therapeutic Drug Monitoring Labs:  1/8/2018: Trough [facility] ADA Levels 2.5, ABs 57 (low) (humira 40 mg SC every 14 days)  5/17/2018: [facility] Trough Humira Level 15, ABs 30 (low) (humira 40 mg SC every 7 days)  11/16/2018: Trough [facility] Humira Level 20, ABs 26 (low) (humira 40 mg SC every 7 days)  1/2/20 trough humira level 13/Abs 29 (low) (humira 40 mg SC every 7 days)  10/16/20 trough humira levels 13/Abs neg (humira 40 mg SC every 7 days)  10/12/21 trough humira levels 13/Abs neg (humira 40 mg SC every 7 days)  9/8/22 trough humira levels 12/Abs neg (humira 40 mg SC every 7 days)    Prior IBD Meds:  Prednisone  Pentasa  MTX 12.5 mg PO weekly (started 8/2017, stopped 8/13/2018 due to elevated LFTs)    Vaccinations:  Lab Results   Component Value Date    HEPBSAB Positive (A) 08/13/2018     Lab Results   Component Value Date    HEPAIGG Positive (A) 08/13/2018     Lab Results    Component Value Date    VARICELLAZOS 2.98 (H) 04/24/2017    VARICELLAINT Positive (A) 04/24/2017     Immunization History   Administered Date(s) Administered    COVID-19, MRNA, LN-S, PF (Pfizer) (Purple Cap) 03/25/2021, 04/16/2021, 05/26/2022    Hepatitis A / Hepatitis B 01/25/2018, 02/22/2018, 07/26/2018    Influenza 11/18/2017, 10/05/2018, 10/28/2019, 11/11/2020, 10/01/2021    Influenza (FLUBLOK) - Quadrivalent - Recombinant - PF *Preferred* (egg allergy) 11/11/2020    Influenza - Quadrivalent - MDCK - PF 11/18/2017    Influenza - Quadrivalent - PF *Preferred* (6 months and older) 10/04/2018, 10/28/2019    Pneumococcal Conjugate - 13 Valent 04/24/2017    Pneumococcal Polysaccharide - 23 Valent 07/25/2017    Tdap 04/24/2017    Zoster Recombinant 10/12/2021, 12/16/2021     Flu shot: yearly  PPSV 23: was due 7/2022  HPV: not sure, NA  Meningococcal:  not sure, NA  MMR (live vaccine): not sure, NA    Review of Systems   Constitutional:  Negative for chills, fever and weight loss.   HENT:          No oral ulcers, dysphagia, oral thrush   Eyes:  Negative for blurred vision, pain and redness.   Respiratory:  Negative for cough and shortness of breath.    Cardiovascular:  Negative for chest pain.   Gastrointestinal:  Negative for abdominal pain, heartburn, nausea and vomiting.   Genitourinary:  Negative for dysuria and hematuria.   Musculoskeletal:  Negative for back pain and joint pain.   Skin:  Negative for rash.   Psychiatric/Behavioral:  Negative for depression. The patient is not nervous/anxious and does not have insomnia.      All Medical History/Surgical History/Family History/Social History/Allergies have been reviewed and updated in EMR    Review of patient's allergies indicates:  No Known Allergies    Outpatient Medications Marked as Taking for the 3/8/23 encounter (Office Visit) with Bubba Spence MD   Medication Sig Dispense Refill    adalimumab (HUMIRA PEN) PnKt injection Inject 1 pen (40 mg total) into  the skin every 7 days. INJECT 1 PEN UNDER THE SKIN EVERY 7 DAYS 4 pen 4    cholecalciferol, vitamin D3, (VITAMIN D3) 50 mcg (2,000 unit) Cap capsule Take 2,000 Units by mouth once daily.      cyanocobalamin (VITAMIN B-12) 1000 MCG tablet Take 100 mcg by mouth once daily.      loperamide (IMODIUM) 2 mg capsule Take 2 mg by mouth once daily. Takes 4-5 times/week      UNABLE TO FIND OTC Men's daily Multi Vit       There were no vitals taken for this visit.    Physical Exam    Lab Results   Component Value Date    CRP 0.3 09/16/2020     Lab Results   Component Value Date    HEPBSAG Negative 04/05/2022    HEPBCAB Negative 04/05/2022     Lab Results   Component Value Date    TBGOLDPLUS Negative 09/08/2022     Lab Results   Component Value Date    ARWIRVUK86RH 45 09/08/2022    EIZXGQHE57 931 09/08/2022     Lab Results   Component Value Date    WBC 6.73 09/08/2022    HGB 14.3 09/08/2022    HCT 42.5 09/08/2022    MCV 87 09/08/2022     09/08/2022     Lab Results   Component Value Date    CREATININE 0.9 09/08/2022    ALBUMIN 4.7 09/08/2022    BILITOT 0.7 09/08/2022    ALKPHOS 35 (L) 09/08/2022    AST 19 09/08/2022    ALT 34 09/08/2022     Assessment/Plan:  Joss Matute is a 37 y.o. male with Crohn's disease (ileum, rectal ulcer, recurrent perianal fistula/abscess- 12/96, 3/2017, 12/2019, setons last removed 6/2020) who continues on Humira 40 mg SC weekly and takes imodium prn.  Since his seton removal 10/2021 no recurrent abscess and fistula almost completely closed but since then has had intermittent drainage from perianal fistula summer 2022 and again 12/2022.  He will continue to monitor this and is due for labs and colonoscopy which will be scheduled     # Crohn's disease (ileum, rectal ulcer, recurrent perianal fistula/abscess- 12/1996, 3/2017, 12/2019, 9/2020)  - perianal fistula- sees Dr. Monterroso, leakage and taking imodium but no recurrent abscess since seton removal 10/2021, perianal fistula drainage summer  2022, 12/2022  - continue Humira 40 mg SC q weekly  - colonoscopy - 4/2023  - vitamin B12- continue vit B12 1000 mcg daily, repeat vit B12 9/2023  - ex-smoker:  Quit fall 2015, discussed risks of smoking and CD with patient in past  - drug monitoring labs: CBC/CMP q 6 months (now), TPMT (normal 4/2017), TB quantiferon (9/2023), Hep B testing (now)  - TDM:  Trough humira levels/abs 3/2023    # Abnormal LFTs:  - followed by hepatology   - 12/2022- ago perianal pressure/irritation/itching/slight pain, few days later some drainage and no recurrence, prior to that had this symptoms summer 2022, next f/u due 10/2024 at which time US and fibroscan recommended  - per notes- suspect mild fatty liver disease, no longer drinks alcohol, A1at mildly low with MS phenotype. Recommend eventual pulm evaluation, PFTs, prior smoker.    # IBD specific health maintenance:  CRC Risk: personal h/o of sporadic adenoma in 1998, proctitis/ileal disease with symptom onset in 1998; surveillance every 5 years   Skin exam yearly - normal end of summer 2022, external dermatologist  Risk for osteopenia/osteoporosis-none  Vitamin D- continue vit D 2000 IU/d, MVI daily  Vaccines: no live vaccines    Follow up: 6 mos    The patient location is: Home  The chief complaint leading to consultation is: Crohn's disease     Visit type: audiovisual    Face to Face time with patient: 13 minutes  30 minutes of total time spent on the encounter, which includes face to face time and non-face to face time preparing to see the patient (eg, review of tests), Obtaining and/or reviewing separately obtained history, Documenting clinical information in the electronic or other health record, Independently interpreting results (not separately reported) and communicating results to the patient/family/caregiver, or Care coordination (not separately reported).     Each patient to whom he or she provides medical services by telemedicine is:  (1) informed of the relationship  between the physician and patient and the respective role of any other health care provider with respect to management of the patient; and (2) notified that he or she may decline to receive medical services by telemedicine and may withdraw from such care at any time.    Bubba Spence MD   Department of Gastroenterology  Medical Director, Inflammatory Bowel Disease

## 2023-03-08 NOTE — PATIENT INSTRUCTIONS
- labs 3/15/23- [facility]  - colonoscopy 4/2023  - coordinate PPSV 23 on same day as colonoscopy   - let me know if any new perianal issues develop

## 2023-03-08 NOTE — PROGRESS NOTES
IBD PATIENT INTAKE:    COVID symptoms in the last 7 days (runny nose, sore throat, congestion, cough, fever): No  PCP: Primary Doctor No  If not PCP-  number given to establish [phone]: Yes    ALLERGIES REVIEWED:  No    CHIEF COMPLAINT:    Chief Complaint   Patient presents with    Crohn's Disease       VITAL SIGNS:  There were no vitals taken for this visit.     Change in medical, surgical, family or social history: No    IBD THERAPY (name, dose/frequency):  Humira Q 7 days   Last dose:  3/2/2023    Next dose:  3/9/2023  Infusion/Pharmacy: [facility]    NSAIDs (aspirin, ibuprofen-advil or motrin, naproxen-aleve, diclofenac-voltaren, BC powder, excedrin, goodies): No    Alternative/Complementary Medications (i.e. probiotics, turmeric, fish oil, aloe vera):      no  Name/dose:  none     Vitamins:   Vit D:  2000 IU daily      Vit B-12:  100 mcg daily    Folic Acid: no       Calcium: no     Iron:  no      MVI: yes     Antibiotics (past 30 Days):  no  If yes   Indication:  Name of antibiotic:  Completion date:     REVIEWED MEDICATION LIST RECONCILED INCLUDING ABOVE MEDS:  yes

## 2023-03-09 ENCOUNTER — TELEPHONE (OUTPATIENT)
Dept: ENDOSCOPY | Facility: HOSPITAL | Age: 38
End: 2023-03-09
Payer: COMMERCIAL

## 2023-03-09 NOTE — TELEPHONE ENCOUNTER
Returned patient's phone call in regards to scheduling colonoscopy. No answer. LVM for patient to call the endoscopy scheduling department at [phone].

## 2023-03-17 ENCOUNTER — TELEPHONE (OUTPATIENT)
Dept: ENDOSCOPY | Facility: HOSPITAL | Age: 38
End: 2023-03-17
Payer: COMMERCIAL

## 2023-03-20 NOTE — TELEPHONE ENCOUNTER
----- Message from Claritza Chavira sent at 5/7/2020 12:13 PM CDT -----  Contact: pt [phone]  Pt would like to speak to someone at the office please call back    Thalidomide Counseling: I discussed with the patient the risks of thalidomide including but not limited to birth defects, anxiety, weakness, chest pain, dizziness, cough and severe allergy.

## 2023-03-27 ENCOUNTER — CLINICAL SUPPORT (OUTPATIENT)
Dept: ENDOSCOPY | Facility: HOSPITAL | Age: 38
End: 2023-03-27
Attending: INTERNAL MEDICINE
Payer: COMMERCIAL

## 2023-03-27 VITALS — WEIGHT: 198 LBS | BODY MASS INDEX: 25.41 KG/M2 | HEIGHT: 74 IN

## 2023-03-27 DIAGNOSIS — K50.813 CROHN'S DISEASE OF BOTH SMALL AND LARGE INTESTINE WITH FISTULA: ICD-10-CM

## 2023-03-27 NOTE — PLAN OF CARE
Patient requested to schedule colonoscopy on Monday 5/15/23. Will call patient when date is available.

## 2023-03-28 DIAGNOSIS — K50.813 CROHN'S DISEASE OF BOTH SMALL AND LARGE INTESTINE WITH FISTULA: Primary | ICD-10-CM

## 2023-03-28 DIAGNOSIS — Z12.11 COLON CANCER SCREENING: ICD-10-CM

## 2023-03-28 RX ORDER — SODIUM, POTASSIUM,MAG SULFATES 17.5-3.13G
SOLUTION, RECONSTITUTED, ORAL ORAL
Qty: 1 KIT | Refills: 0 | Status: SHIPPED | OUTPATIENT
Start: 2023-03-28 | End: 2023-05-10 | Stop reason: SDUPTHER

## 2023-03-31 ENCOUNTER — PATIENT MESSAGE (OUTPATIENT)
Dept: GASTROENTEROLOGY | Facility: CLINIC | Age: 38
End: 2023-03-31
Payer: COMMERCIAL

## 2023-04-08 ENCOUNTER — PATIENT MESSAGE (OUTPATIENT)
Dept: GASTROENTEROLOGY | Facility: CLINIC | Age: 38
End: 2023-04-08
Payer: COMMERCIAL

## 2023-04-10 DIAGNOSIS — K50.813 CROHN'S DISEASE OF BOTH SMALL AND LARGE INTESTINE WITH FISTULA: ICD-10-CM

## 2023-04-10 RX ORDER — ADALIMUMAB 40MG/0.8ML
40 KIT SUBCUTANEOUS
Qty: 4 PEN | Refills: 0 | Status: SHIPPED | OUTPATIENT
Start: 2023-04-10 | End: 2023-05-03

## 2023-04-10 NOTE — TELEPHONE ENCOUNTER
IBD medications Humira    Refill request for Humira    Allergies reviewed Yes    Drug Monitoring labs/frequency for all IBD meds:  CBC/CMP q 6 months; TB & Hep B yearly    Lab Results   Component Value Date    HEPBSAG Negative 04/05/2022    HEPBCAB Negative 04/05/2022     Lab Results   Component Value Date    TBGOLDPLUS Negative 09/08/2022     Lab Results   Component Value Date    [id number] 45 09/08/2022    XSRAWGTE04 931 09/08/2022     Lab Results   Component Value Date    WBC 6.73 09/08/2022    HGB 14.3 09/08/2022    HCT 42.5 09/08/2022    MCV 87 09/08/2022     09/08/2022     Lab Results   Component Value Date    CREATININE 0.9 09/08/2022    ALBUMIN 4.7 09/08/2022    BILITOT 0.7 09/08/2022    ALKPHOS 35 (L) 09/08/2022    AST 19 09/08/2022    ALT 34 09/08/2022       Lab due date (mo/yr):  3/2023    Labs scheduled: No- reminder sent to pt to get done at [facility] now    Next appt: 9/13/23    RX refill sent to provider for amount until next labs: No- due for labs; 1 month supply only

## 2023-04-12 ENCOUNTER — SPECIALTY PHARMACY (OUTPATIENT)
Dept: PHARMACY | Facility: CLINIC | Age: 38
End: 2023-04-12
Payer: COMMERCIAL

## 2023-04-12 NOTE — TELEPHONE ENCOUNTER
Milli, this is Adriane Dominguez with [facility].  We are working on your prescription that your doctor has sent us. We will be working with your insurance to get this approved for you. We will be calling you along the way with updates on your medication.  If you have any questions, you can reach us at [phone].    Welcome call outcome: Patient/caregiver reached    Pt aware Humira requiring PA with new insurance plan of [facility]. Pt asked if [facility] could fill for him and informed we will need to call the plan to check and call pt back with update. Next dose is due 4/20/23.

## 2023-04-12 NOTE — TELEPHONE ENCOUNTER
Used the [employer] portal to submit prior authorization.   Request ID: [id number]    Will await determination.

## 2023-04-14 LAB
ALBUMIN SERPL-MCNC: 4.5 G/DL (ref 4–5)
ALBUMIN/GLOB SERPL: 1.9 {RATIO} (ref 1.2–2.2)
ALP SERPL-CCNC: 36 IU/L (ref 44–121)
ALT SERPL-CCNC: 36 IU/L (ref 0–44)
AST SERPL-CCNC: 20 IU/L (ref 0–40)
BASOPHILS # BLD AUTO: 0.1 X10E3/UL (ref 0–0.2)
BASOPHILS NFR BLD AUTO: 1 %
BILIRUB SERPL-MCNC: 0.5 MG/DL (ref 0–1.2)
BUN SERPL-MCNC: 12 MG/DL (ref 6–20)
BUN/CREAT SERPL: 14 (ref 9–20)
CALCIUM SERPL-MCNC: 9.5 MG/DL (ref 8.7–10.2)
CHLORIDE SERPL-SCNC: 104 MMOL/L (ref 96–106)
CO2 SERPL-SCNC: 25 MMOL/L (ref 20–29)
CREAT SERPL-MCNC: 0.88 MG/DL (ref 0.76–1.27)
EOSINOPHIL # BLD AUTO: 0.6 X10E3/UL (ref 0–0.4)
EOSINOPHIL NFR BLD AUTO: 8 %
ERYTHROCYTE [DISTWIDTH] IN BLOOD BY AUTOMATED COUNT: 11.9 % (ref 11.6–15.4)
EST. GFR  (NO RACE VARIABLE): 114 ML/MIN/1.73
GLOBULIN SER CALC-MCNC: 2.4 G/DL (ref 1.5–4.5)
GLUCOSE SERPL-MCNC: 106 MG/DL (ref 70–99)
HBV SURFACE AG SERPL QL IA: NEGATIVE
HCT VFR BLD AUTO: 43.3 % (ref 37.5–51)
HGB BLD-MCNC: 14.3 G/DL (ref 13–17.7)
IMM GRANULOCYTES # BLD AUTO: 0 X10E3/UL (ref 0–0.1)
IMM GRANULOCYTES NFR BLD AUTO: 0 %
LYMPHOCYTES # BLD AUTO: 3.3 X10E3/UL (ref 0.7–3.1)
LYMPHOCYTES NFR BLD AUTO: 47 %
MCH RBC QN AUTO: 29.5 PG (ref 26.6–33)
MCHC RBC AUTO-ENTMCNC: 33 G/DL (ref 31.5–35.7)
MCV RBC AUTO: 90 FL (ref 79–97)
MONOCYTES # BLD AUTO: 0.5 X10E3/UL (ref 0.1–0.9)
MONOCYTES NFR BLD AUTO: 7 %
NEUTROPHILS # BLD AUTO: 2.6 X10E3/UL (ref 1.4–7)
NEUTROPHILS NFR BLD AUTO: 37 %
PLATELET # BLD AUTO: 381 X10E3/UL (ref 150–450)
POTASSIUM SERPL-SCNC: 5 MMOL/L (ref 3.5–5.2)
PROT SERPL-MCNC: 6.9 G/DL (ref 6–8.5)
RBC # BLD AUTO: 4.84 X10E6/UL (ref 4.14–5.8)
SODIUM SERPL-SCNC: 141 MMOL/L (ref 134–144)
WBC # BLD AUTO: 7 X10E3/UL (ref 3.4–10.8)

## 2023-04-19 NOTE — TELEPHONE ENCOUNTER
Called PA dept at [phone] and spoke with rep Garza who states that even if marked urgent that it can take at least 10 days for the prior authorization to be processed. They received it 4/12/23 so will plan to call back on 4/22/23 to check status.     Will call plan to see if pt has a preferred pharmacy.

## 2023-04-19 NOTE — TELEPHONE ENCOUNTER
[facility] called and made a plan regarding if determination is not made by Monday 4/24/23. Will call plan on Monday.

## 2023-04-19 NOTE — TELEPHONE ENCOUNTER
Called plan and stated that the preferred pharmacy was the few selection(s) on the PA - so pt locked in to use [facility].     Called pt and informed that although PA was urgent that is a minimum x10 day turnaround time right now. Additionally informed [facility] is out of network and will need to fill with [facility]. Pt aware will most likely miss tomorrow's dose on 4/20/23. Offered to see if clinic has any Humira samples.     Called [facility] and message put in to staff if they have Humira samples. Rep left [facility]'s main line and my name for the callback.      Will continue to follow.

## 2023-04-24 NOTE — TELEPHONE ENCOUNTER
Received notification from insurance plan that Humira 40 mg once weekly approved.   From 4/21/23 - 4/21/24   Req ID: [id number]    Upon test claim receiving rejection [facility] is OON. Last call w/ plan stated [facility] was a preferred pharmacy. Will call pt to notify.

## 2023-04-24 NOTE — TELEPHONE ENCOUNTER
Called pt aware electronically routed Humira rx to [facility]. Provided telephone number [phone]. No further questions, pt has used [facility] in the past.     Closing [facility] referral.

## 2023-05-09 ENCOUNTER — TELEPHONE (OUTPATIENT)
Dept: GASTROENTEROLOGY | Facility: CLINIC | Age: 38
End: 2023-05-09
Payer: COMMERCIAL

## 2023-05-09 NOTE — TELEPHONE ENCOUNTER
Called & spoke to pt  - Reviewed arrival time & where to report for 5/15/23 scope  - All questions answered

## 2023-05-09 NOTE — TELEPHONE ENCOUNTER
----- Message from Hemalatha Ramos sent at 5/9/2023 10:45 AM CDT -----  Regarding: Appt Access  Contact: pt [phone]  Pt calling to get instructions on time and where to report for upcoming procedure on 5/15

## 2023-05-10 ENCOUNTER — TELEPHONE (OUTPATIENT)
Dept: ENDOSCOPY | Facility: HOSPITAL | Age: 38
End: 2023-05-10
Payer: COMMERCIAL

## 2023-05-10 DIAGNOSIS — Z12.11 SPECIAL SCREENING FOR MALIGNANT NEOPLASMS, COLON: Primary | ICD-10-CM

## 2023-05-10 RX ORDER — SODIUM, POTASSIUM,MAG SULFATES 17.5-3.13G
1 SOLUTION, RECONSTITUTED, ORAL ORAL DAILY
Qty: 1 KIT | Refills: 0 | Status: SHIPPED | OUTPATIENT
Start: 2023-05-10 | End: 2023-05-12

## 2023-05-15 ENCOUNTER — ANESTHESIA EVENT (OUTPATIENT)
Dept: ENDOSCOPY | Facility: HOSPITAL | Age: 38
End: 2023-05-15
Payer: COMMERCIAL

## 2023-05-15 ENCOUNTER — HOSPITAL ENCOUNTER (OUTPATIENT)
Facility: HOSPITAL | Age: 38
Discharge: HOME | End: 2023-05-15
Attending: INTERNAL MEDICINE | Admitting: INTERNAL MEDICINE
Payer: COMMERCIAL

## 2023-05-15 ENCOUNTER — ANESTHESIA (OUTPATIENT)
Dept: ENDOSCOPY | Facility: HOSPITAL | Age: 38
End: 2023-05-15
Payer: COMMERCIAL

## 2023-05-15 ENCOUNTER — CLINICAL SUPPORT (OUTPATIENT)
Dept: INFECTIOUS DISEASES | Facility: CLINIC | Age: 38
End: 2023-05-15
Payer: COMMERCIAL

## 2023-05-15 VITALS
WEIGHT: 193 LBS | OXYGEN SATURATION: 100 % | DIASTOLIC BLOOD PRESSURE: 70 MMHG | BODY MASS INDEX: 25.58 KG/M2 | HEIGHT: 73 IN | RESPIRATION RATE: 18 BRPM | HEART RATE: 80 BPM | SYSTOLIC BLOOD PRESSURE: 110 MMHG | TEMPERATURE: 98 F

## 2023-05-15 DIAGNOSIS — K50.113 CROHN'S DISEASE OF LARGE INTESTINE WITH FISTULA: Primary | ICD-10-CM

## 2023-05-15 DIAGNOSIS — K50.90 CROHN DISEASE: ICD-10-CM

## 2023-05-15 DIAGNOSIS — K50.813 CROHN'S DISEASE OF BOTH SMALL AND LARGE INTESTINE WITH FISTULA: ICD-10-CM

## 2023-05-15 PROCEDURE — 90732 PPSV23 VACC 2 YRS+ SUBQ/IM: CPT | Mod: S$GLB,,, | Performed by: INTERNAL MEDICINE

## 2023-05-15 PROCEDURE — 25000003 PHARM REV CODE 250: Performed by: NURSE ANESTHETIST, CERTIFIED REGISTERED

## 2023-05-15 PROCEDURE — E9220 PRA ENDO ANESTHESIA: ICD-10-PCS | Mod: ,,, | Performed by: NURSE ANESTHETIST, CERTIFIED REGISTERED

## 2023-05-15 PROCEDURE — 88305 TISSUE EXAM BY PATHOLOGIST: ICD-10-PCS | Mod: 26,,, | Performed by: PATHOLOGY

## 2023-05-15 PROCEDURE — 45380 COLONOSCOPY AND BIOPSY: CPT | Performed by: INTERNAL MEDICINE

## 2023-05-15 PROCEDURE — 37000009 HC ANESTHESIA EA ADD 15 MINS: Performed by: INTERNAL MEDICINE

## 2023-05-15 PROCEDURE — 27201012 HC FORCEPS, HOT/COLD, DISP: Performed by: INTERNAL MEDICINE

## 2023-05-15 PROCEDURE — 45380 PR COLONOSCOPY,BIOPSY: ICD-10-PCS | Mod: ,,, | Performed by: INTERNAL MEDICINE

## 2023-05-15 PROCEDURE — 99999 PR PBB SHADOW E&M-EST. PATIENT-LVL I: ICD-10-PCS | Mod: PBBFAC,,,

## 2023-05-15 PROCEDURE — 63600175 PHARM REV CODE 636 W HCPCS: Performed by: NURSE ANESTHETIST, CERTIFIED REGISTERED

## 2023-05-15 PROCEDURE — 37000008 HC ANESTHESIA 1ST 15 MINUTES: Performed by: INTERNAL MEDICINE

## 2023-05-15 PROCEDURE — 90471 PNEUMOCOCCAL POLYSACCHARIDE VACCINE 23-VALENT =>2YO SQ IM: ICD-10-PCS | Mod: S$GLB,,, | Performed by: INTERNAL MEDICINE

## 2023-05-15 PROCEDURE — E9220 PRA ENDO ANESTHESIA: HCPCS | Mod: ,,, | Performed by: NURSE ANESTHETIST, CERTIFIED REGISTERED

## 2023-05-15 PROCEDURE — 90471 IMMUNIZATION ADMIN: CPT | Mod: S$GLB,,, | Performed by: INTERNAL MEDICINE

## 2023-05-15 PROCEDURE — 45380 COLONOSCOPY AND BIOPSY: CPT | Mod: ,,, | Performed by: INTERNAL MEDICINE

## 2023-05-15 PROCEDURE — 99999 PR PBB SHADOW E&M-EST. PATIENT-LVL I: CPT | Mod: PBBFAC,,,

## 2023-05-15 PROCEDURE — 88305 TISSUE EXAM BY PATHOLOGIST: CPT | Mod: 26,,, | Performed by: PATHOLOGY

## 2023-05-15 PROCEDURE — 88305 TISSUE EXAM BY PATHOLOGIST: CPT | Performed by: PATHOLOGY

## 2023-05-15 PROCEDURE — 90732 PNEUMOCOCCAL POLYSACCHARIDE VACCINE 23-VALENT =>2YO SQ IM: ICD-10-PCS | Mod: S$GLB,,, | Performed by: INTERNAL MEDICINE

## 2023-05-15 RX ORDER — PROPOFOL 10 MG/ML
VIAL (ML) INTRAVENOUS CONTINUOUS PRN
Status: DISCONTINUED | OUTPATIENT
Start: 2023-05-15 | End: 2023-05-15

## 2023-05-15 RX ORDER — PROPOFOL 10 MG/ML
VIAL (ML) INTRAVENOUS
Status: DISCONTINUED | OUTPATIENT
Start: 2023-05-15 | End: 2023-05-15

## 2023-05-15 RX ORDER — SODIUM CHLORIDE 9 MG/ML
INJECTION, SOLUTION INTRAVENOUS CONTINUOUS
Status: DISCONTINUED | OUTPATIENT
Start: 2023-05-15 | End: 2023-05-15 | Stop reason: HOSPADM

## 2023-05-15 RX ORDER — LIDOCAINE HYDROCHLORIDE 20 MG/ML
INJECTION INTRAVENOUS
Status: DISCONTINUED | OUTPATIENT
Start: 2023-05-15 | End: 2023-05-15

## 2023-05-15 RX ADMIN — GLYCOPYRROLATE 0.2 MG: 0.2 INJECTION, SOLUTION INTRAMUSCULAR; INTRAVENOUS at 09:05

## 2023-05-15 RX ADMIN — PROPOFOL 70 MG: 10 INJECTION, EMULSION INTRAVENOUS at 09:05

## 2023-05-15 RX ADMIN — Medication 200 MCG/KG/MIN: at 09:05

## 2023-05-15 RX ADMIN — PROPOFOL 100 MG: 10 INJECTION, EMULSION INTRAVENOUS at 09:05

## 2023-05-15 RX ADMIN — SODIUM CHLORIDE: 0.9 INJECTION, SOLUTION INTRAVENOUS at 09:05

## 2023-05-15 RX ADMIN — LIDOCAINE HYDROCHLORIDE 50 MG: 20 INJECTION INTRAVENOUS at 09:05

## 2023-05-15 NOTE — ANESTHESIA POSTPROCEDURE EVALUATION
Anesthesia Post Evaluation    Patient: Joss Alvarez Juju    Procedure(s) Performed: Procedure(s) (LRB):  COLONOSCOPY (N/A)    Final Anesthesia Type: general      Patient location during evaluation: PACU  Patient participation: Yes- Able to Participate  Level of consciousness: awake and alert  Post-procedure vital signs: reviewed and stable  Pain management: adequate  Airway patency: patent    PONV status at discharge: No PONV  Anesthetic complications: no      Cardiovascular status: blood pressure returned to baseline  Respiratory status: unassisted  Follow-up not needed.          Vitals Value Taken Time   /70 05/15/23 1041   Temp 36.6 °C (97.9 °F) 05/15/23 1018   Pulse 80 05/15/23 1041   Resp 18 05/15/23 1041   SpO2 100 % 05/15/23 1041         Event Time   Out of Recovery 10:49:03         Pain/Ferny Score: Ferny Score: 10 (5/15/2023 10:18 AM)

## 2023-05-15 NOTE — H&P
Short Stay Endoscopy History and Physical    PCP - Primary Doctor No  Referring Physician - Terri Middleton RN  No address on file    Procedure - Colonoscopy  ASA - per anesthesia  Mallampati - per anesthesia  History of Anesthesia problems - no  Family history Anesthesia problems -  no   Plan of anesthesia - General    HPI  37 y.o. male  Reason for procedure: Crohn's disease f/u (rectum/ileum)      ROS:  Constitutional: No fevers, chills, No weight loss  CV: No chest pain  Pulm: No cough, No shortness of breath  GI: see HPI    Medical History:  has a past medical history of Crohn's disease, Ex-smoker (1/24/2018), Immunosuppressed status (1/27/2020), Penile venereal warts (1/25/2018), Vitamin B12 deficiency, and Vitamin D deficiency (6/26/2017).    Surgical History:  has a past surgical history that includes Colonoscopy; Colonoscopy (N/A, 4/7/2017); Abscess drainage; Anal fistulotomy; Upper gastrointestinal endoscopy; Colonoscopy (N/A, 10/10/2018); Examination under anesthesia (N/A, 12/30/2019); Incision and drainage of abscess (12/30/2019); Incision of perirectal abscess (12/30/2019); Insertion of seton stitch (N/A, 12/30/2019); Colonoscopy (N/A, 3/10/2020); Examination under anesthesia (N/A, 9/18/2020); Insertion of seton stitch (9/18/2020); Incision and drainage of abscess (9/18/2020); and Colonoscopy (N/A, 10/12/2021).    Family History: family history includes ALS in his paternal grandfather; No Known Problems in his brother, brother, father, mother, sister, and sister..    Social History:  reports that he quit smoking about 7 years ago. His smoking use included cigarettes. He has never used smokeless tobacco. He reports that he does not currently use alcohol. He reports that he does not use drugs.    Review of patient's allergies indicates:  No Known Allergies    Medications:   Medications Prior to Admission   Medication Sig Dispense Refill Last Dose    cholecalciferol, vitamin D3, (VITAMIN D3) 50 mcg  (2,000 unit) Cap capsule Take 2,000 Units by mouth once daily.   Past Week    cyanocobalamin (VITAMIN B-12) 1000 MCG tablet Take 1,000 mcg by mouth once daily.   Past Week    HUMIRA PEN PnKt injection INJECT 1 PEN UNDER THE SKIN EVERY 7 DAYS 4 pen 4 Past Week    loperamide (IMODIUM) 2 mg capsule Take 2 mg by mouth once daily. Takes 4-5 times/week   Past Week    UNABLE TO FIND OTC Men's daily Multi Vit   Past Week       Physical Exam:    Vital Signs:   Vitals:    05/15/23 0908   BP: (!) 142/78   Pulse: 83   Resp: 15   Temp: 97.9 °F (36.6 °C)       General Appearance: Well appearing in no acute distress  Abdomen: Soft, non tender, non distended with normal bowel sounds, no masses    Labs:  Lab Results   Component Value Date    WBC 7.0 04/13/2023    HGB 14.3 04/13/2023    HCT 43.3 04/13/2023     04/13/2023    CHOL 187 10/16/2020    TRIG 94 10/16/2020    HDL 72 10/16/2020    ALT 36 04/13/2023    AST 20 04/13/2023     04/13/2023    K 5.0 04/13/2023     04/13/2023    CREATININE 0.88 04/13/2023    BUN 12 04/13/2023    CO2 25 04/13/2023    INR 1.0 09/08/2022       I have explained the risks and benefits of this endoscopic procedure to the patient including but not limited to bleeding, inflammation, infection, perforation, and death.      Bubba Spence MD

## 2023-05-15 NOTE — PROVATION PATIENT INSTRUCTIONS
Discharge Summary/Instructions after an Endoscopic Procedure  Patient Name: Joss Matute  Patient MRN: [id number]  Patient YOB: 1985  Monday, May 15, 2023  Bubba Spence MD  Dear patient,  As a result of recent federal legislation (The Federal Cures Act), you may   receive lab or pathology results from your procedure in your [facility]   account before your physician is able to contact you. Your physician or   their representative will relay the results to you with their   recommendations at their soonest availability.  Thank you,  RESTRICTIONS:  During your procedure today, you received medications for sedation.  These   medications may affect your judgment, balance and coordination.  Therefore,   for 24 hours, you have the following restrictions:   - DO NOT drive a car, operate machinery, make legal/financial decisions,   sign important papers or drink alcohol.    ACTIVITY:  Today: no heavy lifting, straining or running due to procedural   sedation/anesthesia.  The following day: return to full activity including work.  DIET:  Eat and drink normally unless instructed otherwise.     TREATMENT FOR COMMON SIDE EFFECTS:  - Mild abdominal pain, nausea, belching, bloating or excessive gas:  rest,   eat lightly and use a heating pad.  - Sore Throat: treat with throat lozenges and/or gargle with warm salt   water.  - Because air was used during the procedure, expelling large amounts of air   from your rectum or belching is normal.  - If a bowel prep was taken, you may not have a bowel movement for 1-3 days.    This is normal.  SYMPTOMS TO WATCH FOR AND REPORT TO YOUR PHYSICIAN:  1. Abdominal pain or bloating, other than gas cramps.  2. Chest pain.  3. Back pain.  4. Signs of infection such as: chills or fever occurring within 24 hours   after the procedure.  5. Rectal bleeding, which would show as bright red, maroon, or black stools.   (A tablespoon of blood from the rectum is not serious, especially if    hemorrhoids are present.)  6. Vomiting.  7. Weakness or dizziness.  GO DIRECTLY TO THE NEAREST EMERGENCY ROOM IF YOU HAVE ANY OF THE FOLLOWING:      Difficulty breathing              Chills and/or fever over 101 F   Persistent vomiting and/or vomiting blood   Severe abdominal pain   Severe chest pain   Black, tarry stools   Bleeding- more than one tablespoon   Any other symptom or condition that you feel may need urgent attention  Your doctor recommends these additional instructions:  If any biopsies were taken, your doctors clinic will contact you in 1 to 2   weeks with any results.  - Discharge patient to home.   - Patient has a contact number available for emergencies.  The signs and   symptoms of potential delayed complications were discussed with the   patient.  Return to normal activities tomorrow.  Written discharge   instructions were provided to the patient.   - Resume previous diet.   - Continue present medications.   - Await pathology results.   - Repeat colonoscopy 2 years to assess disease activity.   - Return to GI clinic as previously scheduled.  For questions, problems or results please call your physician - Bubba Spence MD at Work:  [phone].  [facility] [place], EMERGENCY ROOM PHONE NUMBER: [phone]  IF A COMPLICATION OR EMERGENCY SITUATION ARISES AND YOU ARE UNABLE TO REACH   YOUR PHYSICIAN - GO DIRECTLY TO THE EMERGENCY ROOM.  Bubba Specne MD  5/15/2023 10:15:46 AM  This report has been verified and signed electronically.  Dear patient,  As a result of recent federal legislation (The Federal Cures Act), you may   receive lab or pathology results from your procedure in your [facility]   account before your physician is able to contact you. Your physician or   their representative will relay the results to you with their   recommendations at their soonest availability.  Thank you,  [employer]ATION

## 2023-05-15 NOTE — ANESTHESIA PREPROCEDURE EVALUATION
05/15/2023  Joss Matute is a 37 y.o., male.  Past Medical History:   Diagnosis Date    Crohn's disease     Ex-smoker 1/24/2018    Immunosuppressed status 1/27/2020    Penile venereal warts 1/25/2018    Vitamin B12 deficiency     Vitamin D deficiency 6/26/2017     Past Surgical History:   Procedure Laterality Date    ABSCESS DRAINAGE      ANAL FISTULOTOMY      COLONOSCOPY      COLONOSCOPY N/A 4/7/2017    Procedure: COLONOSCOPY;  Surgeon: Bubba Spence MD;  Location: [facility] ENDO (4TH FLR);  Service: Endoscopy;  Laterality: N/A;    COLONOSCOPY N/A 10/10/2018    Procedure: COLONOSCOPY;  Surgeon: Bubba Spence MD;  Location: [facility] ENDO (4TH FLR);  Service: Endoscopy;  Laterality: N/A;  schedule as 45 minute case--due Fall 2018    COLONOSCOPY N/A 3/10/2020    Procedure: COLONOSCOPY;  Surgeon: Bubba Spence MD;  Location: [facility] ENDO (4TH FLR);  Service: Endoscopy;  Laterality: N/A;  schedule 40 minute case  March 2020        COLONOSCOPY N/A 10/12/2021    Procedure: COLONOSCOPY;  Surgeon: Bubba Spence MD;  Location: [facility] ENDO (4TH FLR);  Service: Endoscopy;  Laterality: N/A;  10/2021- this week or next week  Schedule on a day when Dr. Monterroso is also scoping   Rapid COVID    EXAMINATION UNDER ANESTHESIA N/A 12/30/2019    Procedure: Exam under anesthesia- possible seton;  Surgeon: DENISE Monterroso MD;  Location: [facility] OR 2ND FLR;  Service: Colon and Rectal;  Laterality: N/A;    EXAMINATION UNDER ANESTHESIA N/A 9/18/2020    Procedure: Exam under anesthesia possible seton;  Surgeon: DENISE Monterroso MD;  Location: [facility] OR 2ND FLR;  Service: Colon and Rectal;  Laterality: N/A;    INCISION AND DRAINAGE OF ABSCESS  12/30/2019    Procedure: INCISION AND DRAINAGE, ABSCESS;  Surgeon: DENISE Monterroso MD;  Location: [facility] OR 2ND FLR;  Service: Colon and Rectal;;    INCISION AND DRAINAGE OF ABSCESS   9/18/2020    Procedure: INCISION AND DRAINAGE, ABSCESS;  Surgeon: DENISE Monterroso MD;  Location: [facility] OR 2ND FLR;  Service: Colon and Rectal;;    INCISION OF PERIRECTAL ABSCESS  12/30/2019    Procedure: INCISION, ABSCESS, PERIRECTAL;  Surgeon: DENISE Monterroso MD;  Location: [facility] OR 2ND FLR;  Service: Colon and Rectal;;    INSERTION OF SETON STITCH N/A 12/30/2019    Procedure: PLACEMENT, SETON STITCH;  Surgeon: DENISE Monterroso MD;  Location: [facility] OR 2ND FLR;  Service: Colon and Rectal;  Laterality: N/A;    INSERTION OF SETON STITCH  9/18/2020    Procedure: PLACEMENT, SETON STITCH;  Surgeon: DENISE Monterroso MD;  Location: [facility] OR 2ND FLR;  Service: Colon and Rectal;;    UPPER GASTROINTESTINAL ENDOSCOPY           Pre-op Assessment    I have reviewed the Patient Summary Reports.     I have reviewed the Nursing Notes.    I have reviewed the Medications.     Review of Systems  Hematology/Oncology:  Hematology Normal   Oncology Normal     EENT/Dental:EENT/Dental Normal   Cardiovascular:  Cardiovascular Normal     Pulmonary:  Pulmonary Normal    Renal/:  Renal/ Normal     Hepatic/GI:   Liver Disease,    Musculoskeletal:  Musculoskeletal Normal    Neurological:  Neurology Normal    Endocrine:  Endocrine Normal    Dermatological:  Skin Normal    Psych:  Psychiatric Normal           Physical Exam  General: Well nourished, Cooperative, Alert and Oriented    Airway:  TM Distance: Normal  Tongue: Normal  Neck ROM: Normal ROM    Chest/Lungs:  Clear to auscultation    Heart:  Rate: Normal    Abdomen:  Normal        Anesthesia Plan  Type of Anesthesia, risks & benefits discussed:    Anesthesia Type: Gen Natural Airway  Intra-op Monitoring Plan: Standard ASA Monitors  Induction:  IV  Informed Consent: Informed consent signed with the Patient and all parties understand the risks and agree with anesthesia plan.  All questions answered.   ASA Score: 2    Ready For Surgery From Anesthesia Perspective.     .

## 2023-05-15 NOTE — PROGRESS NOTES
Patient received PCV23 IM in right arm, patient tolerated well and left clinic NAD after observation.

## 2023-05-19 LAB
FINAL PATHOLOGIC DIAGNOSIS: NORMAL
GROSS: NORMAL
Lab: NORMAL

## 2023-09-11 ENCOUNTER — PATIENT MESSAGE (OUTPATIENT)
Dept: GASTROENTEROLOGY | Facility: CLINIC | Age: 38
End: 2023-09-11
Payer: COMMERCIAL

## 2023-09-11 ENCOUNTER — TELEPHONE (OUTPATIENT)
Dept: GASTROENTEROLOGY | Facility: CLINIC | Age: 38
End: 2023-09-11
Payer: COMMERCIAL

## 2023-09-11 DIAGNOSIS — D84.9 IMMUNOSUPPRESSED STATUS: ICD-10-CM

## 2023-09-11 DIAGNOSIS — K50.113 CROHN'S DISEASE OF LARGE INTESTINE WITH FISTULA: Primary | ICD-10-CM

## 2023-09-18 ENCOUNTER — TELEPHONE (OUTPATIENT)
Dept: GASTROENTEROLOGY | Facility: CLINIC | Age: 38
End: 2023-09-18
Payer: COMMERCIAL

## 2023-09-20 ENCOUNTER — TELEPHONE (OUTPATIENT)
Dept: GASTROENTEROLOGY | Facility: CLINIC | Age: 38
End: 2023-09-20
Payer: COMMERCIAL

## 2023-09-20 NOTE — TELEPHONE ENCOUNTER
----- Message from Sarah Tse RN sent at 9/11/2023 11:13 AM CDT -----  COVID sxs started 9/10. Assess to determine resuming Humira.

## 2023-09-20 NOTE — TELEPHONE ENCOUNTER
+COVID 9/10/23 Assessment prior to resuming Humira  - feels good  - no cough, SOB, fever      Per Dr. Spence:  - OK to resume Humira as previously scheduled     Don is contacted via phone, OK to resume Humira.

## 2023-09-26 DIAGNOSIS — K50.813 CROHN'S DISEASE OF BOTH SMALL AND LARGE INTESTINE WITH FISTULA: ICD-10-CM

## 2023-09-26 RX ORDER — ADALIMUMAB 40MG/0.8ML
KIT SUBCUTANEOUS
Qty: 4 PEN | Refills: 0 | Status: SHIPPED | OUTPATIENT
Start: 2023-09-26 | End: 2023-11-09

## 2023-09-26 NOTE — TELEPHONE ENCOUNTER
IBD medications Humira     Refill request for Humira    Allergies reviewed Yes    Drug Monitoring labs/frequency for all IBD meds:  CBC/CMP q 6 mo, TB/Hep B annually    Lab Results   Component Value Date    HEPBSAG Negative 04/13/2023    HEPBCAB Negative 04/05/2022     Lab Results   Component Value Date    TBGOLDPLUS Negative 09/08/2022     Lab Results   Component Value Date    OEYRJPGN96TD 45 09/08/2022    OGJNGIID40 931 09/08/2022     Lab Results   Component Value Date    WBC 7.0 04/13/2023    HGB 14.3 04/13/2023    HCT 43.3 04/13/2023    MCV 90 04/13/2023     04/13/2023     Lab Results   Component Value Date    CREATININE 0.88 04/13/2023    ALBUMIN 4.5 04/13/2023    BILITOT 0.5 04/13/2023    ALKPHOS 35 (L) 09/08/2022    AST 20 04/13/2023    ALT 36 04/13/2023       Lab due date (mo/yr):  10/2023 - going to [facility]    Labs scheduled: No    Next appt: 12/6/23    RX refill sent to provider for amount until next labs: Yes

## 2023-11-06 DIAGNOSIS — K50.813 CROHN'S DISEASE OF BOTH SMALL AND LARGE INTESTINE WITH FISTULA: ICD-10-CM

## 2023-11-07 NOTE — TELEPHONE ENCOUNTER
Spoke with Don:  - He had to cancel his appt in September and forgot about labs in October.  - [facility]  - reports new insurance active as of 11/1/23, hasn't rec'd card but asked to take a picture of the front and back and send in a portal message so we can update this information

## 2023-11-07 NOTE — TELEPHONE ENCOUNTER
IBD medications Humira 40 mg SC q weekly     Refill request for Humira 40 mg    Allergies reviewed Yes    Drug Monitoring labs/frequency for all IBD meds:  CBC, CMP - q 6 months  TB, HEP B - Yearly    Lab Results   Component Value Date    HEPBSAG Negative 04/13/2023    HEPBCAB Negative 04/05/2022     Lab Results   Component Value Date    TBGOLDPLUS Negative 09/08/2022     Lab Results   Component Value Date    BBGHSNLX19IC 45 09/08/2022    TAGMOHRE99 931 09/08/2022     Lab Results   Component Value Date    WBC 7.0 04/13/2023    HGB 14.3 04/13/2023    HCT 43.3 04/13/2023    MCV 90 04/13/2023     04/13/2023     Lab Results   Component Value Date    CREATININE 0.88 04/13/2023    ALBUMIN 4.5 04/13/2023    BILITOT 0.5 04/13/2023    ALKPHOS 35 (L) 09/08/2022    AST 20 04/13/2023    ALT 36 04/13/2023       Lab due date (mo/yr):  10/2023    Labs scheduled: No    Next appt: 12/06/2023    RX refill sent to provider for amount until next labs: Yes

## 2023-11-09 RX ORDER — ADALIMUMAB 40MG/0.8ML
KIT SUBCUTANEOUS
Qty: 4 PEN | Refills: 5 | Status: ACTIVE | OUTPATIENT
Start: 2023-11-09

## 2023-11-10 LAB
ALBUMIN SERPL-MCNC: 4.6 G/DL (ref 4.1–5.1)
ALBUMIN/GLOB SERPL: 1.9 {RATIO} (ref 1.2–2.2)
ALP SERPL-CCNC: 36 IU/L (ref 44–121)
ALT SERPL-CCNC: 33 IU/L (ref 0–44)
AST SERPL-CCNC: 20 IU/L (ref 0–40)
BASOPHILS # BLD AUTO: 0.1 X10E3/UL (ref 0–0.2)
BASOPHILS NFR BLD AUTO: 1 %
BILIRUB SERPL-MCNC: 0.5 MG/DL (ref 0–1.2)
BUN SERPL-MCNC: 11 MG/DL (ref 6–20)
BUN/CREAT SERPL: 13 (ref 9–20)
CALCIUM SERPL-MCNC: 9.3 MG/DL (ref 8.7–10.2)
CHLORIDE SERPL-SCNC: 105 MMOL/L (ref 96–106)
CO2 SERPL-SCNC: 24 MMOL/L (ref 20–29)
CREAT SERPL-MCNC: 0.84 MG/DL (ref 0.76–1.27)
EOSINOPHIL # BLD AUTO: 0.5 X10E3/UL (ref 0–0.4)
EOSINOPHIL NFR BLD AUTO: 6 %
ERYTHROCYTE [DISTWIDTH] IN BLOOD BY AUTOMATED COUNT: 12.2 % (ref 11.6–15.4)
EST. GFR  (NO RACE VARIABLE): 116 ML/MIN/1.73
GAMMA INTERFERON BACKGROUND BLD IA-ACNC: 0.05 IU/ML
GLOBULIN SER CALC-MCNC: 2.4 G/DL (ref 1.5–4.5)
GLUCOSE SERPL-MCNC: 104 MG/DL (ref 70–99)
HBV CORE AB SERPL QL IA: NEGATIVE
HCT VFR BLD AUTO: 39.9 % (ref 37.5–51)
HGB BLD-MCNC: 13.2 G/DL (ref 13–17.7)
IMM GRANULOCYTES # BLD AUTO: 0 X10E3/UL (ref 0–0.1)
IMM GRANULOCYTES NFR BLD AUTO: 0 %
LYMPHOCYTES # BLD AUTO: 3.8 X10E3/UL (ref 0.7–3.1)
LYMPHOCYTES NFR BLD AUTO: 46 %
M TB IFN-G BLD-IMP: NEGATIVE
M TB IFN-G CD4+ T-CELLS BLD-ACNC: 0.08 IU/ML
M TBIFN-G CD4+ CD8+T-CELLS BLD-ACNC: 0.11 IU/ML
MCH RBC QN AUTO: 29.6 PG (ref 26.6–33)
MCHC RBC AUTO-ENTMCNC: 33.1 G/DL (ref 31.5–35.7)
MCV RBC AUTO: 90 FL (ref 79–97)
MITOGEN IGNF BLD-ACNC: >10 IU/ML
MONOCYTES # BLD AUTO: 0.5 X10E3/UL (ref 0.1–0.9)
MONOCYTES NFR BLD AUTO: 6 %
NEUTROPHILS # BLD AUTO: 3.4 X10E3/UL (ref 1.4–7)
NEUTROPHILS NFR BLD AUTO: 41 %
PLATELET # BLD AUTO: 386 X10E3/UL (ref 150–450)
POTASSIUM SERPL-SCNC: 4.3 MMOL/L (ref 3.5–5.2)
PROT SERPL-MCNC: 7 G/DL (ref 6–8.5)
QUANTIFERON TB GOLD (INCUBATED): NORMAL
RBC # BLD AUTO: 4.46 X10E6/UL (ref 4.14–5.8)
SERVICE CMNT-IMP: NORMAL
SODIUM SERPL-SCNC: 141 MMOL/L (ref 134–144)
WBC # BLD AUTO: 8.4 X10E3/UL (ref 3.4–10.8)

## 2023-11-17 ENCOUNTER — TELEPHONE (OUTPATIENT)
Dept: GASTROENTEROLOGY | Facility: CLINIC | Age: 38
End: 2023-11-17
Payer: COMMERCIAL

## 2023-12-06 ENCOUNTER — OFFICE VISIT (OUTPATIENT)
Dept: GASTROENTEROLOGY | Facility: CLINIC | Age: 38
End: 2023-12-06
Payer: COMMERCIAL

## 2023-12-06 DIAGNOSIS — K50.113 CROHN'S DISEASE OF LARGE INTESTINE WITH FISTULA: Primary | ICD-10-CM

## 2023-12-06 PROCEDURE — 99214 PR OFFICE/OUTPT VISIT, EST, LEVL IV, 30-39 MIN: ICD-10-PCS | Mod: 95,,, | Performed by: INTERNAL MEDICINE

## 2023-12-06 PROCEDURE — 1159F MED LIST DOCD IN RCRD: CPT | Mod: CPTII,95,, | Performed by: INTERNAL MEDICINE

## 2023-12-06 PROCEDURE — 99214 OFFICE O/P EST MOD 30 MIN: CPT | Mod: 95,,, | Performed by: INTERNAL MEDICINE

## 2023-12-06 PROCEDURE — 1160F PR REVIEW ALL MEDS BY PRESCRIBER/CLIN PHARMACIST DOCUMENTED: ICD-10-PCS | Mod: CPTII,95,, | Performed by: INTERNAL MEDICINE

## 2023-12-06 PROCEDURE — 1160F RVW MEDS BY RX/DR IN RCRD: CPT | Mod: CPTII,95,, | Performed by: INTERNAL MEDICINE

## 2023-12-06 PROCEDURE — 1159F PR MEDICATION LIST DOCUMENTED IN MEDICAL RECORD: ICD-10-PCS | Mod: CPTII,95,, | Performed by: INTERNAL MEDICINE

## 2023-12-06 RX ORDER — MULTIVITAMIN
1 TABLET ORAL DAILY
COMMUNITY

## 2023-12-06 NOTE — PATIENT INSTRUCTIONS
- labs 5/2024  - if you have not done humira levels then we will get this done 12/13/23  - restart vit D3 2000 IU/d  - get your skin exam done

## 2023-12-06 NOTE — PROGRESS NOTES
"     [facility]             Inflammatory Bowel Disease         Follow-up  Note              TODAY'S VISIT DATE:  12/6/2023    Chief Complaint:   Chief Complaint   Patient presents with    Crohn's Disease     PCP: No, Primary Doctor    Previous History:  Joss Matute is a 38 y.o. male with Crohn's disease (ileum, rectal ulcer, recurrent perianal fistula/abscess- 12/96, 3/2017, 12/2019, setons last removed 6/2020).  He began with symptoms of a perirectal abscess in December 1996 that was initially drained in the ED.  The abscess recurred in December 1997 and was associated with vomiting, weight loss, and abdominal pain.  His initial colonoscopy in 1998 (no actual record just reported in clinic note) showed moderately severe ileitis with biopsies of the right/transverse/left colon and rectum with mild nonspecific acute and chronic reactive changes.  The transverse colon polyp (likely removed) pathology showed it to be "edematous with mild glandular dysplasia."  At that time he was finally diagnosed with Crohn's disease but unsure of the exact location.  Initially he recalls being given prednisone for 4-5 months and pentasa for maintenance.  By 2002, age 17, he stopped all medications and did not have any GI follow up.  He had a hospital admission for a "flare" with dehydration in 2004 and again was not started on any medications.  He was seen in 2007 for RLQ abdominal cramping, increasing fatigue, and increased bowel frequency with blood and mucous.  He had a repeat colonoscopy in 2/2007 that showed patchy area of mucosa in the distal 4 cm of the TI with normal biopsies.  He had recurrent abscess/fistula in 2008 that spontaneously drained and was seen by CRS and did not wish to take any medications.  He reports intermittent abscess/perianal fistula from [id number] with chronic drainage that would cause he to intermittently seek treatment of I&D in the ER.  In 10/2016 he established care with Dr." Braydon when he had a recurrent perianal fistula who planned to start patient on humira after surgical intervention.  He was seen by Dr. Cason on 3/15/2017 and had an EUA with seton placement.  Colonoscopy 4/7/2017 with Dr. LEANDRO Spence showed showed perianal skin tag, seton intact and small rectal ulcer with biopsies consistent with patchy active colitis with features of chronic mucosal injury.  He started Humira on 5/1/2017.  Setons were removed by Dr. Cason in 7/2017.  He started oral MTX in 8/2017 for immunogenicity prevention.  Trough [facility] humira levels were 2.5 with ABs 57 (low) on 1/8/2018 so we planned to increase humira to 40 mg SC weekly and start oral MTX 12.5 mg PO weekly with daily folic acid.  He started humira 40 mg SC every 7 days on 2/6/2018.  Repeat trough [facility] Humira levels were 15 with low ABs of 30 on 5/17/2018.  He had elevated LFTs on 8/13/2018 so oral MTX was discontinued.  Colonoscopy on 10/10/2018 showed a small perianal skin tar, rectal fistula, other normal rectum, colon, and ileum with normal biopsies.  Trough [facility] Humira level 20 with low ABs 26 on 11/16/2018 on humira 40 mg SC every 7 days.  Patient was seen by Dr. Monterroso to assess his fistula and discuss repair on 3/13/2019.  MRI on 3/20/2019 ordered by Dr. Monterroso showed three perianal fistulas with 2 appeared to have blind endings and one extending into the skin surface at the right medial gluteal fold.  On 5/13/2019 patient had some extreme diarrhea and abdominal pain last for a few hours and then resolved.  On 12/29/2019 patient had perianal drainage and rectal pain and started on ciprofloxacin/Flagyl.  He had exam under anesthesia on 12/30/2019 which showed at which time perirectal abscess was drained and seton inserted.  On 1/2/2020 patient had Humira level of 13 with antibodies 29.  Colonoscopy on 3/10/2020 showed anal skin tags with seton int with a few areas mucosal scarring in the rectum and remainder of the colon and  terminal ileum normal.  MRE on 3/10/2020 showed some mild postcontrast enhancement of the terminal ileum likely due to chronic inflammatory change though no active disease and perirectal fistulas better visualized on the anal sphincter MRI noting interval seton placement. In 7/2020 he had elevated LFTs and after discontinuing traci man vitamin, amino acids and creatine this improved. He had some anal pain and was seen in clinic with Dr. Monterroso 9/2020 and prescribed course of augmentin and plans for MRI though hospitalized a day after the clinic visit due to worsening pain and CT showed perianal abscess with EUA showing fistula at level of dentate line with pus drained and seton placed and completed 2 week course of augmentin.  On 10/2020 trough humira levels 13/Abs neg on humira 40 mg SC q 7 days and 10/2021 trough humira levels 13/Abs neg with colonoscopy 10/2021 showing perianal fistula with seton, normal colon and terminal ileum and bx TI normal, rectum minimal focal architectural distortion and seton removed by Dr. Monterroso during procedure. In 12/2022 perianal pressure/discomfort with drainage and then resolved.     Interval History:  - current IBD meds: Humira 40 mg SC q weekly (started 5/1/17, 40 mg weekly 2/2018, LD 11/30, 12/7)  - 2 liquid to formed BMs/day, no blood, urgency or nocturnal BMs  - takes imodium twice a week   - perianal fistula- discomfort, draining with activity- yellow discharge, no blood   - NSAID use: No  - Narcotic use: No  - Alternative/complementary meds for IBD: No      Prior Pertinent Surgeries:   previous fistula repair Xs 2  3/15/2017: EUA with seton placement  6/25/20 Setons removed  9/17 Dr. Monterroso. anal fistula EUA with seton placement    Last pertinent Endoscopy/Imaging:  3/20/2019 MRI pelvis: Three perianal fistulous, two appear blind ending and one of which extends to the skin surface at the right medial gluteal fold.  No evidence for abscess  3/10/20 MRE:  Mild postcontrast  enhancement of the terminal ileum wall likely due to chronic inflammatory change without wall thickening or intraluminal stricture. Perirectal fistulas are better visualized on prior dedicated anal sphincter MRI noting interval seton placement.  9/17/20 CT A/P increased perianal soft tissue and partially organized fluid in right perirectal region (2.0 x 1.4 x 3.3cm)  10/12/21 colonoscopy:  perianal fistula with seton, normal colon and terminal ileum.  Biopsies of terminal ileum normal, rectum with focal minimal architectural distortion, seton removed by Dr. Monterroso during scope    Therapeutic Drug Monitoring Labs:  1/8/2018: Trough [facility] ADA Levels 2.5, ABs 57 (low) (humira 40 mg SC every 14 days)  5/17/2018: [facility] Trough Humira Level 15, ABs 30 (low) (humira 40 mg SC every 7 days)  11/16/2018: Trough [facility] Humira Level 20, ABs 26 (low) (humira 40 mg SC every 7 days)  1/2/20 trough humira level 13/Abs 29 (low) (humira 40 mg SC every 7 days)  10/16/20 trough humira levels 13/Abs neg (humira 40 mg SC every 7 days)  10/12/21 trough humira levels 13/Abs neg (humira 40 mg SC every 7 days)  9/8/22 trough humira levels 12/Abs neg (humira 40 mg SC every 7 days)    Prior IBD Meds:  Prednisone  Pentasa  MTX 12.5 mg PO weekly (started 8/2017, stopped 8/13/2018 due to elevated LFTs)    Vaccinations:  Lab Results   Component Value Date    HEPBSAB Positive (A) 08/13/2018     Lab Results   Component Value Date    HEPAIGG Positive (A) 08/13/2018     Lab Results   Component Value Date    VARICELLAZOS 2.98 (H) 04/24/2017    VARICELLAINT Positive (A) 04/24/2017     Immunization History   Administered Date(s) Administered    COVID-19, MRNA, LN-S, PF (Pfizer) (Purple Cap) 03/25/2021, 04/16/2021, 05/26/2022    Hepatitis A / Hepatitis B 01/25/2018, 02/22/2018, 07/26/2018    Influenza 11/18/2017, 10/05/2018, 10/28/2019, 11/11/2020, 10/01/2021, 10/14/2022    Influenza (FLUBLOK) - Quadrivalent - Recombinant - PF *Preferred* (egg  allergy) 11/11/2020    Influenza - Quadrivalent - MDCK - PF 11/18/2017    Influenza - Quadrivalent - PF *Preferred* (6 months and older) 10/04/2018, 10/28/2019    Pneumococcal Conjugate - 13 Valent 04/24/2017    Pneumococcal Polysaccharide - 23 Valent 07/25/2017, 05/15/2023    Tdap 04/24/2017    Zoster Recombinant 10/12/2021, 12/16/2021   Flu shot: recommended yearly, UTD  COVID vaccine/booster:  per CDC recommendations, UTD  RSV: after age 66 yo  Tetanus (Tdap):  2/2027  PPSV 20: after age 66 yo  HPV: not sure, NA  Meningococcal:  not sure, NA  MMR (live vaccine): not sure, NA    Review of Systems   Constitutional:  Negative for chills, fever and weight loss.   HENT:          No oral ulcers, dysphagia, oral thrush   Eyes:  Negative for blurred vision, pain and redness.   Respiratory:  Negative for cough and shortness of breath.    Cardiovascular:  Negative for chest pain.   Gastrointestinal:  Negative for abdominal pain, heartburn, nausea and vomiting.   Genitourinary:  Negative for dysuria and hematuria.   Musculoskeletal:  Negative for back pain and joint pain.   Skin:  Negative for rash.   Psychiatric/Behavioral:  Negative for depression. The patient is not nervous/anxious and does not have insomnia.      All Medical History/Surgical History/Family History/Social History/Allergies have been reviewed and updated in EMR    Review of patient's allergies indicates:  No Known Allergies    Outpatient Medications Marked as Taking for the 12/6/23 encounter (Office Visit) with Bubba Spence MD   Medication Sig Dispense Refill    cholecalciferol, vitamin D3, (VITAMIN D3) 50 mcg (2,000 unit) Cap capsule Take 2,000 Units by mouth once daily.      cyanocobalamin (VITAMIN B-12) 1000 MCG tablet Take 1,000 mcg by mouth once daily.      HUMIRA PEN PnKt injection INJECT 1 PEN UNDER THE SKIN EVERY 7 DAYS 4 Pen 5    loperamide (IMODIUM) 2 mg capsule Take 2 mg by mouth once daily. Takes 4-5 times/week as needed      multivitamin  (ONE DAILY MULTIVITAMIN) per tablet Take 1 tablet by mouth once daily.      UNABLE TO FIND OTC Men's daily Multi Vit       There were no vitals taken for this visit.    Physical Exam    Lab Results   Component Value Date    CRP 0.3 09/16/2020     Lab Results   Component Value Date    HEPBSAG Negative 04/13/2023    HEPBCAB Negative 11/08/2023     Lab Results   Component Value Date    TBGOLDPLUS Negative 09/08/2022     Lab Results   Component Value Date    TTHFHRBO85QH 45 09/08/2022    BSTMZYNA70 931 09/08/2022     Lab Results   Component Value Date    WBC 8.4 11/08/2023    HGB 13.2 11/08/2023    HCT 39.9 11/08/2023    MCV 90 11/08/2023     11/08/2023     Lab Results   Component Value Date    CREATININE 0.84 11/08/2023    ALBUMIN 4.6 11/08/2023    BILITOT 0.5 11/08/2023    ALKPHOS 35 (L) 09/08/2022    AST 20 11/08/2023    ALT 33 11/08/2023     Assessment/Plan:  Joss Matute is a 38 y.o. male with Crohn's disease (ileum, rectal ulcer, recurrent perianal fistula/abscess- 12/96, 3/2017, 12/2019, setons last removed 6/2020) who continues on Humira 40 mg SC weekly and takes imodium prn.      Since his seton removal 10/2021 no recurrent abscess but intermittent fistula drainage.  He will continue to monitor this and is due for labs and colonoscopy which will be scheduled     # Crohn's disease (ileum, rectal ulcer, recurrent perianal fistula/abscess- 12/1996, 3/2017, 12/2019, 9/2020)  - perianal fistula- sees Dr. Monterroso, leakage and taking imodium but no recurrent abscess since seton removal 10/2021, perianal fistula drainage summer 2022, 12/2022  - continue Humira 40 mg SC q weekly  - colonoscopy - 5/2025  - vitamin B12- continue vit B12 1000 mcg daily, repeat vit B12 5/2024  - ex-smoker:  Quit fall 2015, discussed risks of smoking and CD with patient in past  - drug monitoring labs: CBC/CMP q 6 months (5/2024), TPMT (normal 4/2017), TB quantiferon (11/2024), Hep B testing (now)  - TDM:  Trough humira levels/abs if  not done will do 12/13/23, will f/u with [facility] to see if done 9/11/23    # Abnormal LFTs:  - followed by hepatology, alcohol cessation and since then normal LFTs  - last saw hepatology 10/2022 and next f/u due 10/2024 at which time US and fibroscan recommended- suspect mild fatty liver disease, no longer drinks alcohol, A1at mildly low with MS phenotype. Recommend eventual pulm evaluation, PFTs, prior smoker.    # Immunodeficiency due to long term immunosuppressive drug therapy and  IBD specific health maintenance:  CRC Risk: personal h/o of sporadic adenoma in 1998, 2023, proctitis/ileal disease with symptom onset in 1998; surveillance every 5 years   Skin exam yearly - normal end of summer 2022, reminded to schedule, external dermatologist  Risk for osteopenia/osteoporosis-none  Vitamin D- restart vit D 2000 IU/d, MVI daily  Vaccines: no live vaccines, UTD    Follow up in about 1 year (around 12/6/2024) for virtual Wed AM.    The patient location is: Home  The chief complaint leading to consultation is: Crohn's disease     Visit type: audiovisual    Face to Face time with patient: 13 minutes  38 minutes of total time spent on the encounter, which includes face to face time and non-face to face time preparing to see the patient (eg, review of tests), Obtaining and/or reviewing separately obtained history, Documenting clinical information in the electronic or other health record, Independently interpreting results (not separately reported) and communicating results to the patient/family/caregiver, or Care coordination (not separately reported).     Each patient to whom he or she provides medical services by telemedicine is:  (1) informed of the relationship between the physician and patient and the respective role of any other health care provider with respect to management of the patient; and (2) notified that he or she may decline to receive medical services by telemedicine and may withdraw from such care at any  time.    Bubba Spence MD   Department of Gastroenterology  Medical Director, Inflammatory Bowel Disease

## 2024-03-05 NOTE — TRANSFER OF CARE
"Anesthesia Transfer of Care Note    Patient: Joss Matute    Procedure(s) Performed: Procedure(s) (LRB):  COLONOSCOPY (N/A)    Patient location: PACU    Anesthesia Type: general    Transport from OR: Transported from OR on room air with adequate spontaneous ventilation    Post pain: adequate analgesia    Post assessment: tolerated procedure well and no apparent anesthetic complications    Post vital signs: stable    Level of consciousness: sedated and responds to stimulation    Nausea/Vomiting: no nausea/vomiting    Complications: none    Transfer of care protocol was followed      Last vitals:   Visit Vitals  BP (!) 142/78   Pulse 83   Temp 36.6 °C (97.9 °F)   Resp 15   Ht 6' 1" (1.854 m)   Wt 87.5 kg (193 lb)   SpO2 100%   BMI 25.46 kg/m²     " The patient has been examined and the H&P has been reviewed:    I concur with the findings and no changes have occurred since H&P was written.    Procedure risks, benefits and alternative options discussed and understood by patient/family.          There are no hospital problems to display for this patient.

## 2024-04-16 ENCOUNTER — TELEPHONE (OUTPATIENT)
Dept: GASTROENTEROLOGY | Facility: CLINIC | Age: 39
End: 2024-04-16
Payer: COMMERCIAL

## 2024-04-16 ENCOUNTER — PATIENT MESSAGE (OUTPATIENT)
Dept: GASTROENTEROLOGY | Facility: CLINIC | Age: 39
End: 2024-04-16
Payer: COMMERCIAL

## 2024-04-16 DIAGNOSIS — K50.113 CROHN'S DISEASE OF LARGE INTESTINE WITH FISTULA: Primary | ICD-10-CM

## 2024-04-16 NOTE — TELEPHONE ENCOUNTER
Spoke with patient to get him scheduled for labs. Patient stated he would like to go tomorrow to [facility]. Patient was informed that his orders will be sent to his portal for the details, and that he also will get a appointment reminder for his virtual appointment that is scheduled for 12/06/2024.

## 2024-04-16 NOTE — TELEPHONE ENCOUNTER
Currently on  Humira 40 mg SC q weekly (started 5/1/17, 40 mg weekly 2/2018, LD 4/11, ND 4/18)     Script sent to [facility]  Labs up to date: CBC CMP due 5/2024; TB and hep B neg 11/2023, repeat 11/2024.

## 2024-05-09 DIAGNOSIS — K50.113 CROHN'S DISEASE OF LARGE INTESTINE WITH FISTULA: Primary | ICD-10-CM

## 2024-05-09 RX ORDER — ADALIMUMAB 40MG/0.4ML
KIT SUBCUTANEOUS
Qty: 4 PEN | Refills: 4 | Status: SHIPPED | OUTPATIENT
Start: 2024-05-09 | End: 2024-05-10

## 2024-05-09 NOTE — TELEPHONE ENCOUNTER
Primary provider: Jordy    IBD medications  Humira 40 mg SC q weekly (started 5/1/17, 40 mg weekly 2/2018)    Refill request for Humira 40 mg SC q weekly    Allergies reviewed Yes    Drug Monitoring labs/frequency for all IBD meds:  CBC CMP q 6 months, TB and HepB yearly    Lab Results   Component Value Date    HEPBSAG Negative 04/13/2023    HEPBCAB Negative 11/08/2023     Lab Results   Component Value Date    TBGOLDPLUS Negative 09/08/2022     Lab Results   Component Value Date    QUANTNILVALU 0.05 11/08/2023    QUANTTBGDPL Negative 11/08/2023      Lab Results   Component Value Date    EZAMMWYR71IH 45 09/08/2022    LTTBOARF15 875 04/17/2024     Lab Results   Component Value Date    WBC 7.7 04/17/2024    HGB 14.3 04/17/2024    HCT 42.8 04/17/2024    MCV 89 04/17/2024     04/17/2024     Lab Results   Component Value Date    CREATININE 0.80 04/17/2024    ALBUMIN 4.6 04/17/2024    BILITOT 0.5 04/17/2024    ALKPHOS 35 (L) 09/08/2022    AST 22 04/17/2024    ALT 41 04/17/2024       Lab due date (mo/yr):  10/2024    Labs scheduled: no     Next appt: 12/4/24    RX refill sent to provider for amount until next labs: Yes

## 2024-05-10 NOTE — TELEPHONE ENCOUNTER
Humira refill approval sent to [facility] on 5/9/24, but [facility] is in network with new insurance.   Humira Rx sent to [facility] discontinued, Re-sent to [facility].

## 2024-06-27 DIAGNOSIS — Z12.11 COLON CANCER SCREENING: ICD-10-CM

## 2024-06-27 RX ORDER — SODIUM, POTASSIUM,MAG SULFATES 17.5-3.13G
SOLUTION, RECONSTITUTED, ORAL ORAL
Qty: 1 KIT | Refills: 0 | Status: SHIPPED | OUTPATIENT
Start: 2024-06-27

## 2024-07-15 ENCOUNTER — PATIENT MESSAGE (OUTPATIENT)
Dept: GASTROENTEROLOGY | Facility: CLINIC | Age: 39
End: 2024-07-15
Payer: COMMERCIAL

## 2024-07-15 NOTE — TELEPHONE ENCOUNTER
Per Joss's PM:  IBD meds:  - Humira 40mg weekly (Thursdays)    Infection on thigh, started Bactrim BID x 7 today    - Humira instructions given  - enc him to contact us if there is any need to deviate from this plan

## 2024-10-02 ENCOUNTER — TELEPHONE (OUTPATIENT)
Dept: GASTROENTEROLOGY | Facility: CLINIC | Age: 39
End: 2024-10-02
Payer: COMMERCIAL

## 2024-10-02 DIAGNOSIS — K50.113 CROHN'S DISEASE OF LARGE INTESTINE WITH FISTULA: ICD-10-CM

## 2024-10-02 DIAGNOSIS — K50.113 CROHN'S DISEASE OF LARGE INTESTINE WITH FISTULA: Primary | ICD-10-CM

## 2024-10-02 RX ORDER — ADALIMUMAB 40MG/0.4ML
KIT SUBCUTANEOUS
Qty: 4 PEN | Refills: 0 | Status: SHIPPED | OUTPATIENT
Start: 2024-10-02

## 2024-10-02 NOTE — TELEPHONE ENCOUNTER
Primary provider:     IBD medications Humira 40 mg SC q weekly (started 5/1/17, 40 mg weekly 2/2018)    Refill request for Humira 40 mg SC q weekly    Allergies reviewed Yes    Drug Monitoring labs/frequency for all IBD meds:  CBC CMP q6 months, TB and HepB yearly     Lab Results   Component Value Date    HEPBSAG Negative 04/13/2023    HEPBCAB Negative 11/08/2023        Lab Results   Component Value Date    QUANTNILVALU 0.05 11/08/2023    QUANTTBGDPL Negative 11/08/2023       Lab Results   Component Value Date    WBC 7.7 04/17/2024    HGB 14.3 04/17/2024    HCT 42.8 04/17/2024    MCV 89 04/17/2024     04/17/2024     Lab Results   Component Value Date    CREATININE 0.80 04/17/2024    ALBUMIN 4.6 04/17/2024    BILITOT 0.5 04/17/2024    ALKPHOS 35 (L) 09/08/2022    AST 22 04/17/2024    ALT 41 04/17/2024       Lab due date (mo/yr):  10/2024    Labs scheduled: no - spoke with pt- he is requesting labslips for [facility].  Requesting Dr Spence's staff to send [facility] message with lab slips.       Next appt: 12/4/24    RX refill sent to provider for amount until next labs: Yes  1 month supply only given pt due for labs this month.  Pt stated he would go to [facility] in the next 2 weeks.    Staff message sent to request portal message be sent with labslips for CBC, CMP, Tb, HepB Core Ab, HepB Surface Ag      
Addendum

## 2024-10-02 NOTE — TELEPHONE ENCOUNTER
Spoke with pt  - he is due for labs this month  - he agreed to go to [facility] in the next 2 weeks  - requesting staff assist with sending [facility] message with labslips- CBC, CMP, Tb, hepB surface Ag, hepb Core Ab

## 2024-10-22 ENCOUNTER — PATIENT MESSAGE (OUTPATIENT)
Dept: GASTROENTEROLOGY | Facility: CLINIC | Age: 39
End: 2024-10-22
Payer: COMMERCIAL

## 2024-10-25 NOTE — TELEPHONE ENCOUNTER
Spoke with [facility]:  - CBC & CMP were not collected  - able to add on CMP but not the CBC    Spoke with Don:  - advised of missed tests  - he agrees to go in early next week for the CBC  - enc him to show the staff each order to ensure all tests are collected for  - he states understanding and agrees with this plan

## 2024-11-15 ENCOUNTER — TELEPHONE (OUTPATIENT)
Dept: GASTROENTEROLOGY | Facility: CLINIC | Age: 39
End: 2024-11-15
Payer: COMMERCIAL

## 2024-11-15 DIAGNOSIS — K50.113 CROHN'S DISEASE OF LARGE INTESTINE WITH FISTULA: ICD-10-CM

## 2024-11-15 NOTE — TELEPHONE ENCOUNTER
----- Message from Pharmacist Tamera sent at 11/15/2024  9:29 AM CST -----  Regarding: CBC  Hello,    Pt went to [facility] at the end of October for routine labs, and [facility] did not draw the CBC that was ordered.  Pt said he would go back to [facility] to get the CBC drawn the next week, but I do not see the result.  Can you please reach out to patient and follow up on this?  Please ask him to get get the CBC done soon if he has not already.  Let him know that Humira refill processing is dependent on getting this done.    Thank you  Ali

## 2024-11-15 NOTE — TELEPHONE ENCOUNTER
Called & spoke to pt  - Did not go to [facility] for CBC to be drawn  - Informed pt lab needs to be completed in order to process Humira refill  - Pt will try to go today but if unable will go Tuesday, 11/19/24  - Pt to let us know once he goes to [facility]

## 2024-11-15 NOTE — TELEPHONE ENCOUNTER
Primary provider: GARRY    IBD medications  Humira 40 mg SC q weekly (started 5/1/17, 40 mg weekly 2/2018)    Refill request for Humira 40 mg SC q weekly     Allergies reviewed Yes    Drug Monitoring labs/frequency for all IBD meds:  CBC CMP q6 months, TB and HepB yearly    Lab Results   Component Value Date    HEPBSAG Negative 10/22/2024    HEPBCAB Negative 10/22/2024        Lab Results   Component Value Date    QUANTNILVALU 0.14 10/22/2024    QUANTTBGDPL Negative 10/22/2024         Lab Results   Component Value Date    WBC 7.7 04/17/2024    HGB 14.3 04/17/2024    HCT 42.8 04/17/2024    MCV 89 04/17/2024     04/17/2024     Lab Results   Component Value Date    CREATININE 0.95 10/22/2024    ALBUMIN 4.5 10/22/2024    BILITOT 0.3 10/22/2024    ALKPHOS 35 (L) 09/08/2022    AST 22 10/22/2024    ALT 36 10/22/2024       Lab due date (mo/yr):  10/2024- CBC only ([facility] previously did not draw CBC as ordered when pt went to their facility in 10/2024.  Pt said he would go back early the following week to get the CBC done.  It does not appear that patient went back)    Labs scheduled: no - staff message sent to request that staff reaches out to patient to ask him to go back to [facility] for CBC    Next appt: 12/4/24    RX refill sent to provider for amount until next labs:  pending confirmation pt will go back to [facility] to get CBC done

## 2024-11-18 RX ORDER — ADALIMUMAB 40MG/0.4ML
KIT SUBCUTANEOUS
Qty: 4 PEN | Refills: 0 | Status: SHIPPED | OUTPATIENT
Start: 2024-11-18

## 2024-11-18 NOTE — TELEPHONE ENCOUNTER
Staff spoke with pt-  He confirmed he will go get CBC done at [facility] by 11/19/24.   Pending approval for 1 month supply and set reminder to f/u to make sure lab completed.

## 2024-11-19 ENCOUNTER — PATIENT MESSAGE (OUTPATIENT)
Dept: GASTROENTEROLOGY | Facility: CLINIC | Age: 39
End: 2024-11-19
Payer: COMMERCIAL

## 2024-11-20 LAB
BASOPHILS # BLD AUTO: 0.1 X10E3/UL (ref 0–0.2)
BASOPHILS NFR BLD AUTO: 1 %
EOSINOPHIL # BLD AUTO: 0.9 X10E3/UL (ref 0–0.4)
EOSINOPHIL NFR BLD AUTO: 9 %
ERYTHROCYTE [DISTWIDTH] IN BLOOD BY AUTOMATED COUNT: 12 % (ref 11.6–15.4)
HCT VFR BLD AUTO: 41.1 % (ref 37.5–51)
HGB BLD-MCNC: 13.9 G/DL (ref 13–17.7)
IMM GRANULOCYTES # BLD AUTO: 0 X10E3/UL (ref 0–0.1)
IMM GRANULOCYTES NFR BLD AUTO: 0 %
LYMPHOCYTES # BLD AUTO: 4.4 X10E3/UL (ref 0.7–3.1)
LYMPHOCYTES NFR BLD AUTO: 43 %
MCH RBC QN AUTO: 30.2 PG (ref 26.6–33)
MCHC RBC AUTO-ENTMCNC: 33.8 G/DL (ref 31.5–35.7)
MCV RBC AUTO: 89 FL (ref 79–97)
MONOCYTES # BLD AUTO: 0.8 X10E3/UL (ref 0.1–0.9)
MONOCYTES NFR BLD AUTO: 8 %
NEUTROPHILS # BLD AUTO: 4 X10E3/UL (ref 1.4–7)
NEUTROPHILS NFR BLD AUTO: 39 %
PLATELET # BLD AUTO: 388 X10E3/UL (ref 150–450)
RBC # BLD AUTO: 4.6 X10E6/UL (ref 4.14–5.8)
WBC # BLD AUTO: 10.2 X10E3/UL (ref 3.4–10.8)

## 2024-12-04 ENCOUNTER — OFFICE VISIT (OUTPATIENT)
Dept: GASTROENTEROLOGY | Facility: CLINIC | Age: 39
End: 2024-12-04
Payer: COMMERCIAL

## 2024-12-04 ENCOUNTER — TELEPHONE (OUTPATIENT)
Dept: GASTROENTEROLOGY | Facility: CLINIC | Age: 39
End: 2024-12-04

## 2024-12-04 VITALS — BODY MASS INDEX: 25.03 KG/M2 | WEIGHT: 195 LBS | HEIGHT: 74 IN

## 2024-12-04 DIAGNOSIS — K50.113 CROHN'S DISEASE OF LARGE INTESTINE WITH FISTULA: Primary | ICD-10-CM

## 2024-12-04 DIAGNOSIS — D84.9 IMMUNOSUPPRESSED STATUS: ICD-10-CM

## 2024-12-04 PROCEDURE — 99215 OFFICE O/P EST HI 40 MIN: CPT | Mod: 95,,, | Performed by: INTERNAL MEDICINE

## 2024-12-04 PROCEDURE — 1159F MED LIST DOCD IN RCRD: CPT | Mod: CPTII,95,, | Performed by: INTERNAL MEDICINE

## 2024-12-04 PROCEDURE — G2211 COMPLEX E/M VISIT ADD ON: HCPCS | Mod: 95,,, | Performed by: INTERNAL MEDICINE

## 2024-12-04 PROCEDURE — 3008F BODY MASS INDEX DOCD: CPT | Mod: CPTII,95,, | Performed by: INTERNAL MEDICINE

## 2024-12-04 NOTE — PROGRESS NOTES
"     [facility]             Inflammatory Bowel Disease         Follow-up  Note              TODAY'S VISIT DATE:  12/4/2024    Chief Complaint:   Chief Complaint   Patient presents with    Crohn's Disease     PCP: No, Primary Doctor    Previous History:  Joss Matute is a 39 y.o. male with Crohn's disease (ileum, rectal ulcer, recurrent perianal fistula/abscess- 12/96, 3/2017, 12/2019, setons last removed 6/2020).  He began with symptoms of a perirectal abscess in December 1996 that was initially drained in the ED.  The abscess recurred in December 1997 and was associated with vomiting, weight loss, and abdominal pain.  His initial colonoscopy in 1998 (no actual record just reported in clinic note) showed moderately severe ileitis with biopsies of the right/transverse/left colon and rectum with mild nonspecific acute and chronic reactive changes.  The transverse colon polyp (likely removed) pathology showed it to be "edematous with mild glandular dysplasia."  At that time he was finally diagnosed with Crohn's disease but unsure of the exact location.  Initially he recalls being given prednisone for 4-5 months and pentasa for maintenance.  By 2002, age 17, he stopped all medications and did not have any GI follow up.  He had a hospital admission for a "flare" with dehydration in 2004 and again was not started on any medications.  He was seen in 2007 for RLQ abdominal cramping, increasing fatigue, and increased bowel frequency with blood and mucous.  He had a repeat colonoscopy in 2/2007 that showed patchy area of mucosa in the distal 4 cm of the TI with normal biopsies.  He had recurrent abscess/fistula in 2008 that spontaneously drained and was seen by CRS and did not wish to take any medications.  He reports intermittent abscess/perianal fistula from [id number] with chronic drainage that would cause he to intermittently seek treatment of I&D in the ER.  In 10/2016 he established care with Dr." Braydon when he had a recurrent perianal fistula who planned to start patient on humira after surgical intervention.  He was seen by Dr. Cason on 3/15/2017 and had an EUA with seton placement.  Colonoscopy 4/7/2017 with Dr. LEANDRO Spence showed showed perianal skin tag, seton intact and small rectal ulcer with biopsies consistent with patchy active colitis with features of chronic mucosal injury.  He started Humira on 5/1/2017.  Setons were removed by Dr. Cason in 7/2017.  He started oral MTX in 8/2017 for immunogenicity prevention.  Trough [facility] humira levels were 2.5 with ABs 57 (low) on 1/8/2018 so we planned to increase humira to 40 mg SC weekly and start oral MTX 12.5 mg PO weekly with daily folic acid.  He started humira 40 mg SC every 7 days on 2/6/2018.  Repeat trough [facility] Humira levels were 15 with low ABs of 30 on 5/17/2018.  He had elevated LFTs on 8/13/2018 so oral MTX was discontinued.  Colonoscopy on 10/10/2018 showed a small perianal skin tar, rectal fistula, other normal rectum, colon, and ileum with normal biopsies.  Trough [facility] Humira level 20 with low ABs 26 on 11/16/2018 on humira 40 mg SC every 7 days.  Patient was seen by Dr. Monterroso to assess his fistula and discuss repair on 3/13/2019.  MRI on 3/20/2019 ordered by Dr. Monterroso showed three perianal fistulas with 2 appeared to have blind endings and one extending into the skin surface at the right medial gluteal fold.  On 5/13/2019 patient had some extreme diarrhea and abdominal pain last for a few hours and then resolved.  On 12/29/2019 patient had perianal drainage and rectal pain and started on ciprofloxacin/Flagyl.  He had exam under anesthesia on 12/30/2019 which showed at which time perirectal abscess was drained and seton inserted.  On 1/2/2020 patient had Humira level of 13 with antibodies 29.  Colonoscopy on 3/10/2020 showed anal skin tags with seton int with a few areas mucosal scarring in the rectum and remainder of the colon and  terminal ileum normal.  MRE on 3/10/2020 showed some mild postcontrast enhancement of the terminal ileum likely due to chronic inflammatory change though no active disease and perirectal fistulas better visualized on the anal sphincter MRI noting interval seton placement. In 7/2020 he had elevated LFTs and after discontinuing traci man vitamin, amino acids and creatine this improved. He had some anal pain and was seen in clinic with Dr. Monterroso 9/2020 and prescribed course of augmentin and plans for MRI though hospitalized a day after the clinic visit due to worsening pain and CT showed perianal abscess with EUA showing fistula at level of dentate line with pus drained and seton placed and completed 2 week course of augmentin.  On 10/2020 trough humira levels 13/Abs neg on humira 40 mg SC q 7 days and 10/2021 trough humira levels 13/Abs neg with colonoscopy 10/2021 showing perianal fistula with seton, normal colon and terminal ileum and bx TI normal, rectum minimal focal architectural distortion and seton removed by Dr. Monterroso during procedure. In 12/2022 perianal pressure/discomfort with drainage and then resolved.     Interval History:  - current IBD meds: Humira 40 mg SC q weekly (started 5/1/17, 40 mg weekly 2/2018, LD 11/28 , ND 12/5)- had a misfired pen 2 weeks ago  - other pertinent meds:  imodium twice a week  - 2 liquid to formed BMs/day, no blood, urgency or nocturnal BMs  - perianal fistula- discomfort, draining with activity- yellow discharge- once a weeks, no recurrent abscess   - 4/17/24 trough ADA 14/Abs 35  - NSAID use: No  - Narcotic use: No  - Alternative/complementary meds for IBD: No      Prior Pertinent Surgeries:   previous fistula repair Xs 2  3/15/2017: EUA with seton placement  6/25/20 Setons removed  9/17 Dr. Monterroso. anal fistula EUA with seton placement    Last pertinent Endoscopy/Imaging:  3/20/2019 MRI pelvis: Three perianal fistulous, two appear blind ending and one of which extends to  "the skin surface at the right medial gluteal fold.  No evidence for abscess  3/10/20 MRE:  Mild postcontrast enhancement of the terminal ileum wall likely due to chronic inflammatory change without wall thickening or intraluminal stricture. Perirectal fistulas are better visualized on prior dedicated anal sphincter MRI noting interval seton placement.  9/17/20 CT A/P increased perianal soft tissue and partially organized fluid in right perirectal region (2.0 x 1.4 x 3.3cm)  10/12/21 colonoscopy:  perianal fistula with seton, normal colon and terminal ileum.  Biopsies of terminal ileum normal, rectum with focal minimal architectural distortion, seton removed by Dr. Monterroso during scope    Therapeutic Drug Monitoring Labs:  1/8/2018: Trough [facility] ADA Levels 2.5, ABs 57 (low) (humira 40 mg SC every 14 days)  5/17/2018: [facility] Trough Humira Level 15, ABs 30 (low) (humira 40 mg SC every 7 days)  11/16/2018: Trough [facility] Humira Level 20, ABs 26 (low) (humira 40 mg SC every 7 days)  1/2/20 trough humira level 13/Abs 29 (low) (humira 40 mg SC every 7 days)  10/16/20 trough humira levels 13/Abs neg (humira 40 mg SC every 7 days)  10/12/21 trough humira levels 13/Abs neg (humira 40 mg SC every 7 days)  9/8/22 trough humira levels 12/Abs neg (humira 40 mg SC every 7 days)  - 4/17/24 trough ADA 14/Abs 35 (humira 40 mg SC every 7 days)    Prior IBD Meds:  Prednisone  Pentasa  MTX 12.5 mg PO weekly (started 8/2017, stopped 8/13/2018 due to elevated LFTs)    Vaccinations:  Lab Results   Component Value Date    HEPBSAB Positive (A) 08/13/2018     Lab Results   Component Value Date    HEPAIGG Positive (A) 08/13/2018     Lab Results   Component Value Date    VARICELLAZOS 2.98 (H) 04/24/2017    VARICELLAINT Positive (A) 04/24/2017     No results found for: "MUMPSIGGSCRE", "MUMPSIGGINTE"   No results found for: "RUBEOLAIGGAN", "RUBEOLAINTER"    Immunization History   Administered Date(s) Administered    COVID-19, MRNA, LN-S, PF " (Pfizer) (Purple Cap) 03/25/2021, 04/16/2021, 05/26/2022    Hepatitis A / Hepatitis B 01/25/2018, 02/22/2018, 07/26/2018    Influenza 11/18/2017, 10/05/2018, 10/28/2019, 11/11/2020, 10/01/2021, 10/14/2022    Influenza (FLUBLOK) - Quadrivalent - Recombinant - PF *Preferred* (egg allergy) 11/11/2020    Influenza - Quadrivalent - MDCK - PF 11/18/2017    Influenza - Quadrivalent - PF *Preferred* (6 months and older) 10/04/2018, 10/28/2019    Pneumococcal Conjugate - 13 Valent 04/24/2017    Pneumococcal Polysaccharide - 23 Valent 07/25/2017, 05/15/2023    Tdap 04/24/2017    Zoster Recombinant 10/12/2021, 12/16/2021   Flu shot: recommended yearly, reminded to schedule  COVID vaccine/booster:  per CDC recommendations, UTD  RSV: after age 51 yo  Tetanus (Tdap):  2/2027  PCV 20: after age 66 yo  HPV: not sure, NA  Meningococcal:  not sure, NA  MMR (live vaccine): not sure, NA    Review of Systems   Constitutional:  Negative for chills, fever and weight loss.   HENT:          No oral ulcers, dysphagia, oral thrush   Eyes:  Negative for blurred vision, pain and redness.   Respiratory:  Negative for cough and shortness of breath.    Cardiovascular:  Negative for chest pain.   Gastrointestinal:  Negative for abdominal pain, heartburn, nausea and vomiting.   Genitourinary:  Negative for dysuria and hematuria.   Musculoskeletal:  Negative for back pain and joint pain.   Skin:  Negative for rash.   Psychiatric/Behavioral:  Negative for depression. The patient is not nervous/anxious and does not have insomnia.      All Medical History/Surgical History/Family History/Social History/Allergies have been reviewed and updated in EMR    Outpatient Medications Marked as Taking for the 12/4/24 encounter (Office Visit) with Bubba Spence MD   Medication Sig Dispense Refill    adalimumab (HUMIRA,CF, PEN) 40 mg/0.4 mL PnKt INJECT 1 PEN SUBCUTANEOUSLY  WEEKLY 4 pen 0    cholecalciferol, vitamin D3, (VITAMIN D3) 50 mcg (2,000 unit) Cap  "capsule Take 2,000 Units by mouth once daily.      cyanocobalamin (VITAMIN B-12) 1000 MCG tablet Take 1,000 mcg by mouth once daily.      loperamide (IMODIUM) 2 mg capsule Take 2 mg by mouth once daily. Takes 4-5 times/week as needed      multivitamin (ONE DAILY MULTIVITAMIN) per tablet Take 1 tablet by mouth once daily.       Ht 6' 2" (1.88 m)   Wt 88.5 kg (195 lb)   BMI 25.04 kg/m²   Physical Exam    Lab Results   Component Value Date    CRP 0.3 09/16/2020     Lab Results   Component Value Date    HEPBSAG Negative 10/22/2024    HEPBCAB Negative 10/22/2024     Lab Results   Component Value Date    TBGOLDPLUS Negative 09/08/2022     Lab Results   Component Value Date    EYYHNEZT97OP 45 09/08/2022    QINZNFTR09 875 04/17/2024     Lab Results   Component Value Date    WBC 10.2 11/19/2024    HGB 13.9 11/19/2024    HCT 41.1 11/19/2024    MCV 89 11/19/2024     11/19/2024     Lab Results   Component Value Date    CREATININE 0.95 10/22/2024    ALBUMIN 4.5 10/22/2024    BILITOT 0.3 10/22/2024    ALKPHOS 35 (L) 09/08/2022    AST 22 10/22/2024    ALT 36 10/22/2024     Assessment/Plan:  Joss Matute is a 39 y.o. male with Crohn's disease (ileum, rectal ulcer, recurrent perianal fistula/abscess- 12/96, 3/2017, 12/2019, setons last removed 6/2020) who continues on Humira 40 mg SC weekly and takes imodium prn.      Patient continues with intermittent perianal drainage but no abscess and takes imodium few times/week.  Otherwise he is well on humira weekly with therapeutic drug levels but low abs so will repeat now.  Plans for colonoscopy 4/2025.     # Crohn's disease (ileum, rectal ulcer, recurrent perianal fistula/abscess- 12/1996, 3/2017, 12/2019, 9/2020)  - perianal fistula- sees Dr. Monterroso, leakage and taking imodium but no recurrent abscess since seton removal 10/2021, perianal fistula drainage summer 2022, 12/2022  - continue Humira 40 mg SC q weekly  - colonoscopy - 4/2025- suprep  - vitamin B12- continue vit B12 " 1000 mcg daily, repeat vit B12 4/2025  - former smoker:  quit fall 2015, continued cessation  - drug monitoring labs: CBC/CMP q 6 months (4/2025), TPMT (normal 4/2017), TB quantiferon (11/2024), Hep B testing (now)  - TDM:  Trough humira levels/abs repeat now    # Abnormal LFTs:  - followed by hepatology, alcohol cessation and since then normal LFTs  - last saw hepatology 10/2022 and next f/u due 10/2024 at which time US and fibroscan recommended- suspect mild fatty liver disease, no longer drinks alcohol, A1at mildly low with MS phenotype. Recommend eventual pulm evaluation, PFTs, prior smoker.    # Immunodeficiency due to long term immunosuppressive drug therapy and  IBD specific health maintenance:  CRC Risk: personal h/o of sporadic adenoma in 1998, 2023, proctitis/ileal disease with symptom onset in 1998; surveillance every 5 years   Skin exam yearly - normal end of summer 2022, reminded to schedule, external dermatologist  Risk for osteopenia/osteoporosis-none  Vitamin D- continue vit D 2000 IU/d, MVI daily  Vaccines: no live vaccines, MMR titers 4/2025, recommend flu shot    Follow up in about 9 months (around 9/4/2025) for virtual Wed AM.    The patient location is: Home  The chief complaint leading to consultation is: Crohn's disease     Visit type: audiovisual    Face to Face time with patient: 13 minutes  38 minutes of total time spent on the encounter, which includes face to face time and non-face to face time preparing to see the patient (eg, review of tests), Obtaining and/or reviewing separately obtained history, Documenting clinical information in the electronic or other health record, Independently interpreting results (not separately reported) and communicating results to the patient/family/caregiver, or Care coordination (not separately reported).     Each patient to whom he or she provides medical services by telemedicine is:  (1) informed of the relationship between the physician and patient and  the respective role of any other health care provider with respect to management of the patient; and (2) notified that he or she may decline to receive medical services by telemedicine and may withdraw from such care at any time.    Bubba Spence MD   Department of Gastroenterology  Medical Director, Inflammatory Bowel Disease

## 2024-12-04 NOTE — PATIENT INSTRUCTIONS
- trough humira levels/abs today or tomorrow- [facility]  - labs CBC/CMP and colonoscopy 4/2025- suprep  - get flu shot done  - get dermatology visit for skin exam yearly

## 2024-12-09 DIAGNOSIS — K50.113 CROHN'S DISEASE OF LARGE INTESTINE WITH FISTULA: ICD-10-CM

## 2024-12-09 RX ORDER — ADALIMUMAB 40MG/0.4ML
KIT SUBCUTANEOUS
Qty: 4 PEN | Refills: 3 | Status: SHIPPED | OUTPATIENT
Start: 2024-12-09

## 2024-12-09 NOTE — TELEPHONE ENCOUNTER
Primary provider: Dr. Spence    IBD medications current IBD meds: Humira 40 mg SC q weekly (started 5/1/17, 40 mg weekly 2/2018, LD 11/28 , ND 12/5)     Refill request for Humira 40 mg SC q weekly     Allergies reviewed Yes    Drug Monitoring labs/frequency for all IBD meds:   CBC CMP q6 months, TB and HepB yearly     Lab Results   Component Value Date    HEPBSAG Negative 10/22/2024    HEPBCAB Negative 10/22/2024     Lab Results   Component Value Date    TBGOLDPLUS Negative 09/08/2022     Lab Results   Component Value Date    QUANTNILVALU 0.14 10/22/2024    QUANTTBGDPL Negative 10/22/2024      Lab Results   Component Value Date    TSPOTSCREN See result image under hyperlink 11/29/2018     Lab Results   Component Value Date    AZZYZZNL71DT 45 09/08/2022    JORPNCLI61 875 04/17/2024     Lab Results   Component Value Date    WBC 10.2 11/19/2024    HGB 13.9 11/19/2024    HCT 41.1 11/19/2024    MCV 89 11/19/2024     11/19/2024     Lab Results   Component Value Date    CREATININE 0.95 10/22/2024    ALBUMIN 4.5 10/22/2024    BILITOT 0.3 10/22/2024    ALKPHOS 35 (L) 09/08/2022    AST 22 10/22/2024    ALT 36 10/22/2024       Lab due date (mo/yr):  04/2025    Labs scheduled: No - will notify staff; staff has already been notified    Next appt: 9/4/2025    RX refill sent to provider for amount until next labs: Yes

## 2024-12-23 ENCOUNTER — TELEPHONE (OUTPATIENT)
Dept: ENDOSCOPY | Facility: HOSPITAL | Age: 39
End: 2024-12-23
Payer: COMMERCIAL

## 2024-12-23 DIAGNOSIS — K50.919 CROHN'S DISEASE WITH COMPLICATION, UNSPECIFIED GASTROINTESTINAL TRACT LOCATION: Primary | ICD-10-CM

## 2024-12-23 DIAGNOSIS — Z12.11 ENCOUNTER FOR SCREENING COLONOSCOPY FOR NON-HIGH-RISK PATIENT: Primary | ICD-10-CM

## 2024-12-23 RX ORDER — SODIUM, POTASSIUM,MAG SULFATES 17.5-3.13G
1 SOLUTION, RECONSTITUTED, ORAL ORAL DAILY
Qty: 1 KIT | Refills: 0 | Status: SHIPPED | OUTPATIENT
Start: 2024-12-23 | End: 2024-12-25

## 2024-12-23 NOTE — TELEPHONE ENCOUNTER
Referral for procedure from [facility]      Spoke to pt to schedule procedure(s) Colonoscopy       Physician to perform procedure(s) Dr. LEANDRO Spence  Date of Procedure (s) 04/07/25  Arrival Time 8:00 AM  Time of Procedure(s) 9:00 AM   Location of Procedure(s) [place] 4th Floor  Type of Rx Prep sent to patient: Suprep  Instructions provided to patient via [facility]    Patient was informed on the following information and verbalized understanding. Screening questionnaire reviewed with patient and complete. If procedure requires anesthesia, a responsible adult needs to be present to accompany the patient home, patient cannot drive after receiving anesthesia. Appointment details are tentative, especially check-in time. Patient will receive a prep-op call 7 days prior to confirm check-in time for procedure. If applicable the patient should contact their pharmacy to verify Rx for procedure prep is ready for pick-up. Patient was advised to call the scheduling department at [phone] if pharmacy states no Rx is available. Patient was advised to call the endoscopy scheduling department if any questions or concerns arise.       Endoscopy Scheduling Department

## 2024-12-23 NOTE — TELEPHONE ENCOUNTER
"----- Message from Fernanda sent at 2024 12:30 PM CST -----    ----- Message -----  From: Cecilia Root RN  Sent: 2024  10:20 AM CST  To: Naeem Quezada MA; #    Procedure: Colonoscopy    Diagnosis: Crohn's disease    Procedure Timin2025; let us know once scheduled. Need to coordinate same day labs (CBC/CMP)    #If within 4 weeks selected, please carolyn as high priority#    #If greater than 12 weeks, please select "5-12 weeks" and delay sending until 3 months prior to requested date#     Provider: Dr. VERONICA Spence    Location: Any Site    Additional Scheduling Information: No scheduling concerns    Prep Specifications:Suprep    Is the patient taking a GLP-1 Agonist:no    Have you attached a patient to this message: yes  "

## 2025-01-29 ENCOUNTER — PATIENT MESSAGE (OUTPATIENT)
Dept: GASTROENTEROLOGY | Facility: CLINIC | Age: 40
End: 2025-01-29
Payer: COMMERCIAL

## 2025-01-29 DIAGNOSIS — K50.113 CROHN'S DISEASE OF LARGE INTESTINE WITH FISTULA: ICD-10-CM

## 2025-01-29 RX ORDER — ADALIMUMAB 40MG/0.4ML
40 KIT SUBCUTANEOUS
Qty: 4 PEN | Refills: 2 | Status: ACTIVE | OUTPATIENT
Start: 2025-01-29

## 2025-01-29 NOTE — TELEPHONE ENCOUNTER
Primary provider: Dr. Spence    IBD medications Humira 40 mg SC q weekly (started 5/1/17, 40 mg weekly 2/2018)    Refill request for Humira 40 mg SC q weekly - sending Rx to [facility] for auth under new plan    Allergies reviewed Yes    Drug Monitoring labs/frequency for all IBD meds:  CBC CMP q6 months, TB and HepB yearly    Lab Results   Component Value Date    HEPBSAG Negative 10/22/2024    HEPBCAB Negative 10/22/2024     Lab Results   Component Value Date    QUANTNILVALU 0.14 10/22/2024    QUANTTBGDPL Negative 10/22/2024      Lab Results   Component Value Date    WBC 10.2 11/19/2024    HGB 13.9 11/19/2024    HCT 41.1 11/19/2024    MCV 89 11/19/2024     11/19/2024     Lab Results   Component Value Date    CREATININE 0.95 10/22/2024    ALBUMIN 4.5 10/22/2024    BILITOT 0.3 10/22/2024    ALKPHOS 35 (L) 09/08/2022    AST 22 10/22/2024    ALT 36 10/22/2024       Lab due date (mo/yr):  4/2025    Labs scheduled: Yes   4/7/25    Next appt: scope 4/7/25, then Dr Spence f/u 9/10/25    RX refill sent to provider for amount until next labs: Yes

## 2025-02-05 ENCOUNTER — TELEPHONE (OUTPATIENT)
Dept: GASTROENTEROLOGY | Facility: CLINIC | Age: 40
End: 2025-02-05
Payer: COMMERCIAL

## 2025-02-05 DIAGNOSIS — K50.113 CROHN'S DISEASE OF LARGE INTESTINE WITH FISTULA: Primary | ICD-10-CM

## 2025-02-05 RX ORDER — ADALIMUMAB-RYVK 40MG/0.4ML
40 KIT SUBCUTANEOUS
Qty: 4 PEN | Refills: 2 | Status: ACTIVE | OUTPATIENT
Start: 2025-02-05

## 2025-02-05 NOTE — TELEPHONE ENCOUNTER
- current IBD meds:  Humira 40 mg SC q weekly (started 5/1/17, 40 mg weekly 2/2018, LD 1/30, ND 2/6)   - pt has new insurance   - humira is an exclusion and plan prefers simlandi or hadlima  - reviewed pts chart  - no history of EIM of skin   - script for simlandi 40mg SC weekly sent to [facility]

## 2025-02-05 NOTE — TELEPHONE ENCOUNTER
Spoke with pt  - Current IBD meds:  Humira 40 mg SC q weekly (started 5/1/17, 40 mg weekly 2/2018, LD 1/30, ND 2/6)  - Pt has new insurance, and [facility] is working on auth but expect 2/6 dose to be delayed given [employer] [place] has long [place] processing times + pt has no Humira on hand  - Pt was supposed to get ADA level done in 12/2024 per Dr Spence plan of care, but he forgot  - Pt denied any missed doses of Humira in the last 8 weeks.  Recommend pt to get ADA level done today or tomorrow given his 2/6 dose will be delayed and then will need to wait for consistent dosing before drug level can be done  - [facility]

## 2025-02-12 LAB
ADALIMUMAB AB SERPL-MCNC: <25 NG/ML
ADALIMUMAB SERPL-MCNC: 13 UG/ML

## 2025-02-19 NOTE — TELEPHONE ENCOUNTER
- Reached out to [facility] and clarified Rx processing information   - Called [facility] back ([phone]) and spoke with rep Lauren. She obtained successful paid claim with processing info provided.  Rep indicated pt does not have copay assistance on file.

## 2025-02-19 NOTE — TELEPHONE ENCOUNTER
- Current IBD meds:  Humira 40 mg SC q weekly- changing to Simlandi 40 mg weekly (started 5/1/17, 40 mg weekly 2/2018, LD 1/30, ND was due 2/6 but has not injected due to insurance issue- has missed doses 2/6 and 2/13)  - Advised pt to sign up for Simlandi copay card and then call [facility] today to arrange delivery  - reviewed importance of minimizing gaps in treatment as gaps can lead to immunogenicity   - pt agreed to send portal message confirming he was able to arrange Rx delivery

## 2025-02-19 NOTE — TELEPHONE ENCOUNTER
"Spoke with pt  - Current IBD meds:  Humira 40 mg SC q weekly (started 5/1/17, 40 mg weekly 2/2018, LD 1/30, missed doses 2/6 and 2/13)  - Pt has new insurance effective 2/1 ([employer] [place]) and has been approved for Simlandi 40 mg weekly with new insurance starting 2/11 but reports he has not heard from [facility] to arrange Simlandi Rx delivery.  He checked [facility] lakisha which states Rx "in process"  - Called [facility] ([phone]) and spoke with rep Idania IRVING  Initially rep stated pt does not have insurance on file.  Provided pt's Rx processing info found on pt's insurance card.  Rep states when [facility] runs claim it states "not covered, not active plan"        "

## 2025-02-26 ENCOUNTER — TELEPHONE (OUTPATIENT)
Dept: GASTROENTEROLOGY | Facility: CLINIC | Age: 40
End: 2025-02-26
Payer: COMMERCIAL

## 2025-02-26 NOTE — TELEPHONE ENCOUNTER
- Reason for Call: Follow-up on Humira missed doses   - Contact: Called & spoke to patient  - Disease phenotype: Crohn's disease (ileum, rectal ulcer, recurrent perianal fistula/abscess- 12/96, 3/2017, 12/2019, setons last removed 6/2020)  - Current IBD meds:  Humira 40 mg SC q weekly- changing to Simlandi 40 mg weekly d/t insurance (started 5/1/17, 40 mg weekly 2/2018, LD 1/30, ND was due 2/6 but delay due to insurance- has missed doses 2/6 and 2/13, and 2/20- will receive Simlandi 40 mg weekly Rx delivery [facility] 2/27, advised to resume 40 mg weekly dosing 2/27)  RX Adherence: Adherent  but delays due to insurance/pharmacy  - Additional pertinent information (recent lab, scope, imaging, etc.):   2/5/25 ADA level 13, abs neg (reflective of Humira 40 mg weekly)    - Plan: Re-check ADA level 4 weeks and then 8 weeks after patient resumes Simlandi 40 mg weekly.  If significantly subtherapeutic, consider re-induction    - Plan discussed with pt- he has no questions  - Reminder set to call patient on 3/20 to arrange repeat level before week 4 dose on 3/27      - Colonoscopy scheduled 4/7/25, Next OV: 9/10/25  - Case discussed with Dr Spence

## 2025-03-07 ENCOUNTER — PATIENT MESSAGE (OUTPATIENT)
Dept: GASTROENTEROLOGY | Facility: CLINIC | Age: 40
End: 2025-03-07
Payer: COMMERCIAL

## 2025-03-07 NOTE — TELEPHONE ENCOUNTER
Called pt back instructed to continue to hold off on Simlandi until next week. We will call to check on him Tuesday or Wednesday, pt v/u.

## 2025-03-07 NOTE — TELEPHONE ENCOUNTER
"- Reason for Call: Change in condition positive Flu A , when to resume Simlandi   - Contact: Called & spoke to patient  - Date of symptom onset: 3/2/25  - Symptom details:   -"low grade fever" (99ish highest reported) Sunday- Wednesday, fatigue, weakness, nasal congestion  -"mild stomach irritation after eating", intermittent cramping 1-2/10, loose stools, denies increased BMs, blood, mucus  -symptoms improving, returned to work today    - Disease phenotype: crohn's  - current IBD meds: simlandi 40 mg SC weekly (started 5/1/17, 40 mg weekly 2/2018, LD: 2/27 , ND: 3/6, held d/t flu     -Other pertinent meds: taking OTC meds to treat symptoms, decongestant, mucus relief DM and motrin 200mg q4-6 hours (encouraged to stop motrin and switch to tylenol, verify other medications do not contain acetaminophen), masking fever?    - Additional pertinent information (recent lab, scope, imaging, etc.):    -adalimumab level (2/5/25): 13  - Next OV: 9/10/25  - Provider: Dr. Coe updated   "

## 2025-03-28 ENCOUNTER — TELEPHONE (OUTPATIENT)
Dept: GASTROENTEROLOGY | Facility: CLINIC | Age: 40
End: 2025-03-28
Payer: COMMERCIAL

## 2025-03-28 NOTE — TELEPHONE ENCOUNTER
LM for callback [phone]  - Informed 4/7/25 procedure scheduled for 9:20 am w/ a 8:20 am arrival time

## 2025-03-28 NOTE — TELEPHONE ENCOUNTER
----- Message from Hemalatha sent at 3/28/2025 11:08 AM CDT -----  Regarding: Appt Access  Contact: [phone]  Patient calling to verify time of procedure. Pls call [phone]

## 2025-03-31 ENCOUNTER — TELEPHONE (OUTPATIENT)
Dept: GASTROENTEROLOGY | Facility: CLINIC | Age: 40
End: 2025-03-31
Payer: COMMERCIAL

## 2025-03-31 DIAGNOSIS — K50.113 CROHN'S DISEASE OF LARGE INTESTINE WITH FISTULA: Primary | ICD-10-CM

## 2025-03-31 NOTE — TELEPHONE ENCOUNTER
Reason for Call: Follow-up on Adalimumab delayed doses, TDM  - Contact: Called & spoke to patient  - Disease phenotype: Crohn's disease (ileum, rectal ulcer, recurrent perianal fistula/abscess- 12/96, 3/2017, 12/2019, setons last removed 6/2020)  - Current IBD meds:  Silmandi 40 mg SC q weekly (started 5/1/17, 40 mg weekly 2/2018, missed doses 2/6/25, 2/13/25, and 2/20/25 due to insurance, changed from Humira to Simlandi 2/27/25, missed dose 3/6/25 due to the flu, LD 3/27, ND 4/3) RX Adherence: Adherent  but delays due to insurance/pharmacy and then the flu    - Additional pertinent information (recent lab, scope, imaging, etc.):   - Last ADA level: 2/5/25 ADA level 13, abs neg (reflective of Humira 40 mg weekly)   - Initially planned to re-check ADA level after 4 weeks of consistent dosing post insurance-caused treatment delay but then pt got the flu and missed another dose   - Case discussed with Dr Spence- Recheck ADA level before ND to assess impact of multiple delayed doses    Plan:  - Pt agreed to the plan to check ADA level with ND.  He will use [facility].  - Pt reports he has 0 Silmandi pens on hand and is calling [facility] to arrange delivery for 4/3/25 dose.  - Pt denies any change in GI symptoms since missing Silmandi doses.      Misc:  - Pt said he has not received his prep for 4/7/25 scope . Advised pt Suprep was sent to pharmacy in 12/2024 and he should call [facility] to request the Rx.  If any issues, pt agreed to call clinic back.     - Colonoscopy scheduled 4/7/25, Next OV: 9/10/25  - Case discussed with Dr Spence

## 2025-04-07 ENCOUNTER — HOSPITAL ENCOUNTER (OUTPATIENT)
Facility: HOSPITAL | Age: 40
Discharge: HOME | End: 2025-04-07
Attending: INTERNAL MEDICINE | Admitting: INTERNAL MEDICINE
Payer: COMMERCIAL

## 2025-04-07 ENCOUNTER — ANESTHESIA EVENT (OUTPATIENT)
Dept: ENDOSCOPY | Facility: HOSPITAL | Age: 40
End: 2025-04-07
Payer: COMMERCIAL

## 2025-04-07 ENCOUNTER — ANESTHESIA (OUTPATIENT)
Dept: ENDOSCOPY | Facility: HOSPITAL | Age: 40
End: 2025-04-07
Payer: COMMERCIAL

## 2025-04-07 ENCOUNTER — TELEPHONE (OUTPATIENT)
Dept: GASTROENTEROLOGY | Facility: CLINIC | Age: 40
End: 2025-04-07
Payer: COMMERCIAL

## 2025-04-07 ENCOUNTER — RESULTS FOLLOW-UP (OUTPATIENT)
Dept: GASTROENTEROLOGY | Facility: CLINIC | Age: 40
End: 2025-04-07

## 2025-04-07 VITALS
SYSTOLIC BLOOD PRESSURE: 125 MMHG | TEMPERATURE: 99 F | BODY MASS INDEX: 25.83 KG/M2 | HEART RATE: 72 BPM | WEIGHT: 194.88 LBS | OXYGEN SATURATION: 99 % | HEIGHT: 73 IN | DIASTOLIC BLOOD PRESSURE: 62 MMHG | RESPIRATION RATE: 16 BRPM

## 2025-04-07 DIAGNOSIS — K50.113 CROHN'S DISEASE OF LARGE INTESTINE WITH FISTULA: Primary | ICD-10-CM

## 2025-04-07 DIAGNOSIS — K50.113 CROHN'S DISEASE OF COLON WITH FISTULA: ICD-10-CM

## 2025-04-07 DIAGNOSIS — D84.821 IMMUNODEFICIENCY DUE TO LONG TERM IMMUNOSUPPRESSIVE DRUG THERAPY: ICD-10-CM

## 2025-04-07 DIAGNOSIS — T45.1X5A IMMUNODEFICIENCY DUE TO LONG TERM IMMUNOSUPPRESSIVE DRUG THERAPY: ICD-10-CM

## 2025-04-07 DIAGNOSIS — K50.813 CROHN'S DISEASE OF BOTH SMALL AND LARGE INTESTINE WITH FISTULA: Primary | Chronic | ICD-10-CM

## 2025-04-07 DIAGNOSIS — Z79.60 IMMUNODEFICIENCY DUE TO LONG TERM IMMUNOSUPPRESSIVE DRUG THERAPY: ICD-10-CM

## 2025-04-07 DIAGNOSIS — K50.919 CROHN'S DISEASE WITH COMPLICATION, UNSPECIFIED GASTROINTESTINAL TRACT LOCATION: ICD-10-CM

## 2025-04-07 PROCEDURE — 45380 COLONOSCOPY AND BIOPSY: CPT | Mod: ,,, | Performed by: INTERNAL MEDICINE

## 2025-04-07 PROCEDURE — 27201012 HC FORCEPS, HOT/COLD, DISP: Performed by: INTERNAL MEDICINE

## 2025-04-07 PROCEDURE — 25000003 PHARM REV CODE 250

## 2025-04-07 PROCEDURE — 37000009 HC ANESTHESIA EA ADD 15 MINS: Performed by: INTERNAL MEDICINE

## 2025-04-07 PROCEDURE — 37000008 HC ANESTHESIA 1ST 15 MINUTES: Performed by: INTERNAL MEDICINE

## 2025-04-07 PROCEDURE — 63600175 PHARM REV CODE 636 W HCPCS

## 2025-04-07 PROCEDURE — 45380 COLONOSCOPY AND BIOPSY: CPT | Performed by: INTERNAL MEDICINE

## 2025-04-07 PROCEDURE — 88305 TISSUE EXAM BY PATHOLOGIST: CPT | Mod: TC | Performed by: INTERNAL MEDICINE

## 2025-04-07 RX ORDER — PROPOFOL 10 MG/ML
VIAL (ML) INTRAVENOUS
Status: DISCONTINUED | OUTPATIENT
Start: 2025-04-07 | End: 2025-04-07

## 2025-04-07 RX ORDER — LIDOCAINE HYDROCHLORIDE 20 MG/ML
INJECTION INTRAVENOUS
Status: DISCONTINUED | OUTPATIENT
Start: 2025-04-07 | End: 2025-04-07

## 2025-04-07 RX ADMIN — PROPOFOL 200 MCG/KG/MIN: 10 INJECTION, EMULSION INTRAVENOUS at 10:04

## 2025-04-07 RX ADMIN — PROPOFOL 100 MG: 10 INJECTION, EMULSION INTRAVENOUS at 10:04

## 2025-04-07 RX ADMIN — SODIUM CHLORIDE: 0.9 INJECTION, SOLUTION INTRAVENOUS at 10:04

## 2025-04-07 RX ADMIN — PROPOFOL 50 MG: 10 INJECTION, EMULSION INTRAVENOUS at 10:04

## 2025-04-07 RX ADMIN — LIDOCAINE HYDROCHLORIDE 60 MG: 20 INJECTION INTRAVENOUS at 10:04

## 2025-04-07 NOTE — TRANSFER OF CARE
"Anesthesia Transfer of Care Note    Patient: Joss Matute    Procedure(s) Performed: Procedure(s) (LRB):  COLONOSCOPY (N/A)    Patient location: GI    Anesthesia Type: general    Transport from OR: Transported from OR on room air with adequate spontaneous ventilation    Post pain: adequate analgesia    Post assessment: no apparent anesthetic complications and tolerated procedure well    Post vital signs: stable    Level of consciousness: awake    Nausea/Vomiting: no nausea/vomiting    Complications: none    Transfer of care protocol was followed      Last vitals: Visit Vitals  /76 (BP Location: Left arm, Patient Position: Lying)   Pulse 84   Temp 36.7 °C (98.1 °F)   Resp 16   Ht 6' 1" (1.854 m)   Wt 88.4 kg (194 lb 14.2 oz)   SpO2 100%   BMI 25.71 kg/m²     "

## 2025-04-07 NOTE — PROVATION PATIENT INSTRUCTIONS
Discharge Summary/Instructions after an Endoscopic Procedure  Patient Name: Joss Matute  Patient MRN: [id number]  Patient YOB: 1985 Monday, April 7, 2025  Bubba Spence MD  Dear patient,  As a result of recent federal legislation (The Federal Cures Act), you may   receive lab or pathology results from your procedure in your [facility]   account before your physician is able to contact you. Your physician or   their representative will relay the results to you with their   recommendations at their soonest availability.  Thank you,  RESTRICTIONS:  During your procedure today, you received medications for sedation.  These   medications may affect your judgment, balance and coordination.  Therefore,   for 24 hours, you have the following restrictions:   - DO NOT drive a car, operate machinery, make legal/financial decisions,   sign important papers or drink alcohol.    ACTIVITY:  Today: no heavy lifting, straining or running due to procedural   sedation/anesthesia.  The following day: return to full activity including work.  DIET:  Eat and drink normally unless instructed otherwise.     TREATMENT FOR COMMON SIDE EFFECTS:  - Mild abdominal pain, nausea, belching, bloating or excessive gas:  rest,   eat lightly and use a heating pad.  - Sore Throat: treat with throat lozenges and/or gargle with warm salt   water.  - Because air was used during the procedure, expelling large amounts of air   from your rectum or belching is normal.  - If a bowel prep was taken, you may not have a bowel movement for 1-3 days.    This is normal.  SYMPTOMS TO WATCH FOR AND REPORT TO YOUR PHYSICIAN:  1. Abdominal pain or bloating, other than gas cramps.  2. Chest pain.  3. Back pain.  4. Signs of infection such as: chills or fever occurring within 24 hours   after the procedure.  5. Rectal bleeding, which would show as bright red, maroon, or black stools.   (A tablespoon of blood from the rectum is not serious, especially if    hemorrhoids are present.)  6. Vomiting.  7. Weakness or dizziness.  GO DIRECTLY TO THE NEAREST EMERGENCY ROOM IF YOU HAVE ANY OF THE FOLLOWING:      Difficulty breathing              Chills and/or fever over 101 F   Persistent vomiting and/or vomiting blood   Severe abdominal pain   Severe chest pain   Black, tarry stools   Bleeding- more than one tablespoon   Any other symptom or condition that you feel may need urgent attention  Your doctor recommends these additional instructions:  If any biopsies were taken, your doctors clinic will contact you in 1 to 2   weeks with any results.  - Discharge patient to home.   - Resume previous diet.   - Await pathology results.   - Repeat colonoscopy in 2 years to evaluate the response to therapy.   - Return to GI clinic as previously scheduled.   - Continue present medications.  For questions, problems or results please call your physician - Bubba Spence MD at Work:  [phone].  [facility] [place], EMERGENCY ROOM PHONE NUMBER: [phone]  IF A COMPLICATION OR EMERGENCY SITUATION ARISES AND YOU ARE UNABLE TO REACH   YOUR PHYSICIAN - GO DIRECTLY TO THE EMERGENCY ROOM.  Bubba Spence MD  4/7/2025 10:58:16 AM  This report has been verified and signed electronically.  Dear patient,  As a result of recent federal legislation (The Federal Cures Act), you may   receive lab or pathology results from your procedure in your [facility]   account before your physician is able to contact you. Your physician or   their representative will relay the results to you with their   recommendations at their soonest availability.  Thank you,  PROVATION

## 2025-04-07 NOTE — H&P
Short Stay Endoscopy History and Physical    PCP - No, Primary Doctor  Referring Physician - Bubba Spence MD  [address] [place] [address]    Procedure - Colonoscopy  ASA - per anesthesia  Mallampati - per anesthesia  History of Anesthesia problems - no  Family history Anesthesia problems -  no   Plan of anesthesia - General    HPI  39 y.o. male  Reason for procedure: Crohn's colitis surveillance       ROS:  Constitutional: No fevers, chills, No weight loss  CV: No chest pain  Pulm: No cough, No shortness of breath  GI: see HPI    Medical History:  has a past medical history of Crohn's disease, Ex-smoker (1/24/2018), Immunosuppressed status (1/27/2020), Penile venereal warts (1/25/2018), Vitamin B12 deficiency, and Vitamin D deficiency (6/26/2017).    Surgical History:  has a past surgical history that includes Colonoscopy; Colonoscopy (N/A, 4/7/2017); Abscess drainage; Anal fistulotomy; Upper gastrointestinal endoscopy; Colonoscopy (N/A, 10/10/2018); Examination under anesthesia (N/A, 12/30/2019); Incision and drainage of abscess (12/30/2019); Incision of perirectal abscess (12/30/2019); Insertion of seton stitch (N/A, 12/30/2019); Colonoscopy (N/A, 3/10/2020); Examination under anesthesia (N/A, 9/18/2020); Insertion of seton stitch (9/18/2020); Incision and drainage of abscess (9/18/2020); Colonoscopy (N/A, 10/12/2021); and Colonoscopy (N/A, 5/15/2023).    Family History: family history includes ALS in his paternal grandfather; No Known Problems in his brother, brother, father, mother, sister, and sister..    Social History:  reports that he quit smoking about 9 years ago. His smoking use included cigarettes. He has never used smokeless tobacco. He reports that he does not currently use alcohol. He reports that he does not use drugs.    Review of patient's allergies indicates:  No Known Allergies    Medications:   Prescriptions Prior to Admission[1]    Physical Exam:    Vital Signs:    Vitals:    04/07/25 0945   BP: 139/76   Pulse: 84   Resp: 16   Temp: 98.1 °F (36.7 °C)       General Appearance: Well appearing in no acute distress  Abdomen: Soft, non tender, non distended with normal bowel sounds, no masses    Labs:  Lab Results   Component Value Date    WBC 10.2 11/19/2024    HGB 13.9 11/19/2024    HCT 41.1 11/19/2024     11/19/2024    CHOL 187 10/16/2020    TRIG 94 10/16/2020    HDL 72 10/16/2020    ALT 36 10/22/2024    AST 22 10/22/2024     10/22/2024    K 4.1 10/22/2024     10/22/2024    CREATININE 0.95 10/22/2024    BUN 12 10/22/2024    CO2 25 10/22/2024    INR 1.0 09/08/2022       I have explained the risks and benefits of this endoscopic procedure to the patient including but not limited to bleeding, inflammation, infection, perforation, and death.      Bubba Spence MD         [1]   Medications Prior to Admission   Medication Sig Dispense Refill Last Dose/Taking    cholecalciferol, vitamin D3, (VITAMIN D3) 50 mcg (2,000 unit) Cap capsule Take 2,000 Units by mouth once daily.   Past Week    cyanocobalamin (VITAMIN B-12) 1000 MCG tablet Take 1,000 mcg by mouth once daily.   Past Week    multivitamin (ONE DAILY MULTIVITAMIN) per tablet Take 1 tablet by mouth once daily.   Past Week    adalimumab-ryvk (DEEPALI BARRETT, AUTOINJECTOR) 40 mg/0.4 mL atIK Inject 0.4 mLs (40 mg total) into the skin every 7 days. 4 pen 2 3/20/2025    loperamide (IMODIUM) 2 mg capsule Take 2 mg by mouth once daily. Takes 4-5 times/week as needed       sodium,potassium,mag sulfates (SUPREP BOWEL PREP KIT) 17.5-3.13-1.6 gram SolR FOLLOW INSTRUCTIONS GIVEN BY ENDOSCOPY SCHEDULING NURSE (Patient not taking: Reported on 12/4/2024) 1 kit 0

## 2025-04-08 LAB
ESTROGEN SERPL-MCNC: NORMAL PG/ML
INSULIN SERPL-ACNC: NORMAL U[IU]/ML
LAB AP CLINICAL INFORMATION: NORMAL
LAB AP GROSS DESCRIPTION: NORMAL
LAB AP PERFORMING LOCATION(S): NORMAL
LAB AP REPORT FOOTNOTES: NORMAL

## 2025-04-10 ENCOUNTER — RESULTS FOLLOW-UP (OUTPATIENT)
Dept: GASTROENTEROLOGY | Facility: CLINIC | Age: 40
End: 2025-04-10

## 2025-04-18 NOTE — ANESTHESIA POSTPROCEDURE EVALUATION
Anesthesia Post Evaluation    Patient: Joss Matute    Procedure(s) Performed: Procedure(s) (LRB):  COLONOSCOPY (N/A)    Final Anesthesia Type: general      Patient location during evaluation: PACU  Patient participation: Yes- Able to Participate  Level of consciousness: awake  Post-procedure vital signs: reviewed and stable  Pain management: adequate  Airway patency: patent    PONV status at discharge: No PONV  Anesthetic complications: no      Cardiovascular status: blood pressure returned to baseline  Respiratory status: unassisted  Hydration status: euvolemic  Follow-up not needed.              Vitals Value Taken Time   /62 04/07/25 11:30   Temp 37 °C (98.6 °F) 04/07/25 11:00   Pulse 72 04/07/25 11:30   Resp 16 04/07/25 11:30   SpO2 99 % 04/07/25 11:30         Event Time   Out of Recovery 12:01:48         Pain/Ferny Score: No data recorded

## 2025-05-08 DIAGNOSIS — K50.113 CROHN'S DISEASE OF LARGE INTESTINE WITH FISTULA: ICD-10-CM

## 2025-05-08 RX ORDER — ADALIMUMAB-RYVK 40MG/0.4ML
40 KIT SUBCUTANEOUS
Qty: 4 PEN | Refills: 5 | Status: SHIPPED | OUTPATIENT
Start: 2025-05-08

## 2025-05-08 NOTE — TELEPHONE ENCOUNTER
Primary provider: Dr. Bubba Spence    IBD medications: Silmandi 40 mg SC q weekly (started 5/1/17, 40 mg weekly 2/2018, missed doses 2/6/25, 2/13/25, and 2/20/25 due to insurance, changed from Humira to Simlandi 2/27/25, missed dose 3/6/25 due to the flu, resumed 40 mg weekly 3/13/25, missed dose 4/3/25 due to pharmacy communication issue, resumed 40 mg weekly dosing on 4/8/25)     Refill request for:      Pended Medication(s)   Requested Prescriptions     Pending Prescriptions Disp Refills    ARNOLD,CF, AUTOINJECTOR 40 mg/0.4 mL atIK [Pharmacy Med Name: SIMLANDI PEN (2/PK) 40MG/0.4ML]  2     Sig: INJECT 1 PEN UNDER THE SKIN EVERY 7 DAYS           Allergies reviewed: Yes    Drug Monitoring labs/frequency for all IBD meds:    CBC and CMP every 6 months  TB and Hep B every 12 months    Lab Results   Component Value Date    HEPBSAG Negative 10/22/2024    HEPBCAB Negative 10/22/2024     Lab Results   Component Value Date    TBGOLDPLUS Negative 09/08/2022     Lab Results   Component Value Date    QUANTNILVALU 0.14 10/22/2024    QUANTTBGDPL Negative 10/22/2024      Lab Results   Component Value Date    TSPOTSCREN See result image under hyperlink 11/29/2018     Lab Results   Component Value Date    PWDYREOF57OX 45 09/08/2022    QQNKKIYI86 875 04/17/2024     Lab Results   Component Value Date    WBC 7.14 04/07/2025    HGB 14.6 04/07/2025    HCT 44.3 04/07/2025    MCV 91 04/07/2025     04/07/2025     Lab Results   Component Value Date    CREATININE 0.8 04/07/2025    ALBUMIN 4.1 04/07/2025    BILITOT 0.8 04/07/2025    ALKPHOS 36 (L) 04/07/2025    AST 28 04/07/2025    ALT 54 (H) 04/07/2025     Lab due date (mo/yr):  10/25    Labs scheduled: No - but patient has an OV before or when labs are due     Next appt: 9/10/25    RX refill sent to provider for amount until next labs: Yes

## 2025-08-13 ENCOUNTER — TELEPHONE (OUTPATIENT)
Dept: GASTROENTEROLOGY | Facility: CLINIC | Age: 40
End: 2025-08-13
Payer: COMMERCIAL

## 2025-09-02 ENCOUNTER — PATIENT MESSAGE (OUTPATIENT)
Dept: GASTROENTEROLOGY | Facility: CLINIC | Age: 40
End: 2025-09-02
Payer: COMMERCIAL

## (undated) DEVICE — SEE MEDLINE ITEM 157148

## (undated) DEVICE — NDL 22GA X1 1/2 REG BEVEL

## (undated) DEVICE — SEE MEDLINE ITEM 146417

## (undated) DEVICE — PANTIES FEMININE NAPKIN LG/XLG

## (undated) DEVICE — ELECTRODE REM PLYHSV RETURN 9

## (undated) DEVICE — PAD ABD 8X10 STERILE

## (undated) DEVICE — TRAY MINOR GEN SURG

## (undated) DEVICE — LUBRICANT SURGILUBE 2 OZ

## (undated) DEVICE — NDL 20GX1-1/2IN IB

## (undated) DEVICE — KIT IRR SUCTION HND PIECE

## (undated) DEVICE — SEE MEDLINE ITEM 152622

## (undated) DEVICE — SYR 10CC LUER LOCK

## (undated) DEVICE — GAUZE VASELINE PETRO 3X9

## (undated) DEVICE — SEE MEDLINE ITEM 154981

## (undated) DEVICE — SYR ONLY LUER LOCK 20CC

## (undated) DEVICE — TAPE SILK 3IN

## (undated) DEVICE — SPONGE GELFOAM 12-7MM

## (undated) DEVICE — ELECTRODE BLADE INSULATED 1 IN

## (undated) DEVICE — BRIEF MESH LARGE

## (undated) DEVICE — GAUZE SPONGE 4X4 12PLY

## (undated) DEVICE — SPONGE GAUZE 16PLY 4X4

## (undated) DEVICE — SEE MEDLINE ITEM 157117

## (undated) DEVICE — NDL HYPO A BEVEL 22X1 1/2

## (undated) DEVICE — TAPE SURG DURAPORE 2 X10YD